# Patient Record
Sex: FEMALE | Race: BLACK OR AFRICAN AMERICAN | Employment: OTHER | URBAN - METROPOLITAN AREA
[De-identification: names, ages, dates, MRNs, and addresses within clinical notes are randomized per-mention and may not be internally consistent; named-entity substitution may affect disease eponyms.]

---

## 2017-03-15 NOTE — LETTER
[facility]  [address]  [facility] [address]  Phone: [phone]  Fax: [phone]                  Brittney Bolivar   3/15/2017 10:15 AM   Office Visit    Description:  Female : 2009   Provider:  Elise Galvez NP   Department:  [facility]           Reason for Visit     Rectal Bleeding           Diagnoses this Visit        Comments    Generalized abdominal pain    -  Primary     Constipation, unspecified constipation type                To Do List           Future Appointments        Provider Department Dept Phone    2017 9:15 AM Vinay Horan MD [facility] [phone]      Goals (5 Years of Data)     None      Follow-Up and Disposition     Return if symptoms worsen or fail to improve.      [facility]     [facility] On Call Nurse Care Line -  Assistance  Registered nurses in the [facility] provide clinical advisement, health education, appointment booking, and other advisory services.  Call for this free service at [phone].             Medications                Verify that the below list of medications is an accurate representation of the medications you are currently taking.  If none reported, the list may be blank. If incorrect, please contact your healthcare provider. Carry this list with you in case of emergency.           Current Medications     cetirizine (ZYRTEC) 5 MG chewable tablet Take 1 tablet (5 mg total) by mouth once daily.    cloNIDine (CATAPRES) 0.1 MG tablet Take 0.1 mg by mouth every evening.    diphenhydrAMINE (BENADRYL) 12.5 mg/5 mL elixir Take 2.5 mLs (6.25 mg total) by mouth 4 (four) times daily as needed for Itching or Allergies.    fluoxetine (PROZAC) 20 MG capsule Take 20 mg by mouth every morning.    fluticasone (FLONASE) 50 mcg/actuation nasal spray 1 spray by Each Nare route once daily.    ondansetron (ZOFRAN) 4 MG tablet Take 1 tablet (4 mg total) by mouth every 8 (eight) hours as needed  AUTHORIZATION FOR SURGICAL OPERATION OR OTHER PROCEDURE    1. I hereby authorize Dr. Marshall Bueno and the [facility] Office staff assigned to my case to perform the following operation and/or procedure at the [facility] Office:    _______________________________________________________________________________________________    NERVE BLOCK   _______________________________________________________________________________________________    2.  My physician has explained the nature and purpose of the operation or other procedure, possible alternative methods of treatment, the risks involved, and the possibility of complication to me.  I acknowledge that no guarantee has been made as to the result that may be obtained.  3.  I recognize that, during the course of this operation, or other procedure, unforseen conditions may necessitate additional or different procedure than those listed above.  I, therefore, further authorize and request that the above named physician, his/her physician assistants or designees perform such procedures as are, in his/her professional opinion, necessary and desirable.  4.  Any tissue or organs removed in the operation or other procedure may be disposed of by and at the discretion of the [facility] Office staff and [facility].  5.  I understand that in the event of a medical emergency, I will be transported by local paramedics to [facility] or other hospital emergency department.  6.  I certify that I have read and fully understand the above consent to operation and/or other procedure.    7.  I acknowledge that my physician has explained sedation/analgesia administration to me including the risks and benefits.  I consent to the administration of sedation/analgesia as may be necessary or desirable in the judgement of my physician.    Witness signature: ___________________________________________________ Date:  ______/______/_____                    Time:  ________ A.M.  P.M.        Patient Name:  ____Peg Garcia     [id number]    1/15/1955         Patient signature:  ___________________________________________________      Statement of Physician  My signature below affirms that prior to the time of the procedure, I have explained to the patient and/or his/her guardian, the risks and benefits involved in the proposed treatment and any reasonable alternative to the proposed treatment.  I have also explained the risks and benefits involved in the refusal of the proposed treatment and have answered the patient's questions.                        Date:  ______/______/_______  Provider                      Signature:  __________________________________________________________       Time:  ___________ A.M    P.M.        "for Nausea.           Clinical Reference Information           Your Vitals Were     BP Pulse Resp Height Weight BMI    118/82 (BP Location: Left arm, Patient Position: Sitting, BP Method: Manual) 88 16 4' 2" (1.27 m) 29.8 kg (65 lb 11.2 oz) 18.48 kg/m2      Blood Pressure          Most Recent Value    BP  (!)  118/82      Allergies as of 3/15/2017     Celery (Apium Graveolens) (Umbelliferae)      Immunizations Administered on Date of Encounter - 3/15/2017     None      Orders Placed During Today's Visit      Normal Orders This Visit    Ambulatory Referral to Pediatric Gastroenterology     C-reactive protein     CBC auto differential     Comprehensive metabolic panel     H.Pylori Antibody IgG     Sedimentation rate, manual     Future Labs/Procedures Expected by Expires    X-Ray Abdomen AP 1 View  3/15/2017 3/15/2018      [facility] Proxy Access     For Parents with an Active [facility] Account, Getting Proxy Access to Your Child's Record is Easy!     Ask your provider's office to jessica you access.    Or     1) Sign into your [facility] account.    2) Fill out the online form under My Account >Family Access.    Don't have a [facility] account? Go to [email or web address], and click New User.     Additional Information  If you have questions, please e-mail [email or web address] or call [phone] to talk to our [facility] staff. Remember, [facility] is NOT to be used for urgent needs. For medical emergencies, dial 911.         Instructions      Constipation (Child)    Bowel movement patterns vary in children. A child around age 2 will have about 2 bowel movements per day. After 4 years of age, a child may have 1 bowel movement per day.  A normal stool is soft and easy to pass. But sometimes stools become firm or hard. They are difficult to pass. They may pass less often. This is called constipation. It is common in children. Each child's bowel habits are a little different. What seems like constipation in one child may be normal " in another. Symptoms of constipation can include:  · Abdominal pain  · Refusal to eat  · Bloating  · Vomiting  · Streaks of blood in stools  · Problems holding in urine or stool  · Stool in your child's underwear  · Painful bowel movements  · Itching, swelling, bleeding, or pain around the anus  Constipation can have many causes, such as:  · Eating a diet low in fiber  · Eating too many dairy foods or processed foods  · Not drinking enough liquids  · Lack of exercise or physical activity  · Stress or changes in routine  · Frequent use or misuse of laxatives  · Ignoring the urge to have a bowel movement or delaying bowel movements  · Medicines such as prescription pain medicine, iron, antacids, certain antidepressants, and calcium supplements  · Less commonly, bowel blockage and bowel inflammation  Simple constipation is easy to stop once the cause is known. Healthcare providers may or may not do any tests to diagnose constipation.  Home care  Your childs healthcare provider may prescribe a bowel stimulant, lubricant, or suppository. Your child may also need an enema or a laxative. Follow all instructions on how and when to use these products.  Food, drink, and habit changes  You can help treat and prevent your childs constipation with some simple changes in diet and habits.  Make changes in your childs diet, such as:  · Replace cow's milk with a nondairy milk or formula made from soy or rice.  · Increase fiber in your childs diet. You can do this by adding fruits, vegetables, cereals, and grains.  · Make sure your child eats less meat and processed foods.  · Make sure your child drinks more water. Certain fruit juices such as pear, prune, and apple, can be helpful. However, fruit juices are full of sugar so limit fruit juice to 2 to 4 ounces a day in children 4 to 8 months old, and 6 ounces in children 8 to 12 months old.  · Be patient and make diet changes over time. Most children can be fussy about food.  Help  your child have good toilet habits. Make sure to:  · Teach your child not wait to have a bowel movement.  · Have your child sit on the toilet for 10 minutes at the same time each day. It is helpful to have your child sit after each meal. This helps to create a routine.  · Give your child a comfortable childs toilet seat and a footstool.  · You can read or keep your child company to make it a positive experience.  Follow-up care  Follow up with your childs healthcare provider.  Special note to parents  Learn to be familiar with your childs normal bowel pattern. Note the color, form, and frequency of stools.  Call 911  Call 911 if your child has any of these symptoms:  · Firm belly that is very painful to the touch  · Trouble breathing  · Confusion  · Loss of consciousness  · Rapid heart rate  When to seek medical advice  Call your childs healthcare provider right away if any of these occur:  · Abdominal pain that gets worse  · Fussiness or crying that cant be soothed  · Refusal to drink or eat  · Blood in stool  · Black, tarry stool  · Constipation that does not get better  · Weight loss  · Your child is younger than 12 weeks and has a fever of 100.4°F (38°C)  or higher because your baby may need to be seen by his or her healthcare provider  · Your child is younger than 2 years old and his or her fever continues for more than 24 hours or your child 2 years or older has a fever for more than 3 days.  · A child 2 years or older has a fever for more than 3 days  · A child of any age has repeated fevers above 104°F (40°C)   Date Last Reviewed: 12/12/2015  © 2481-8865 [employer]. [address] [place] [address]. All rights reserved. This information is not intended as a substitute for professional medical care. Always follow your healthcare professional's instructions.        When Your Child Has Constipation    Constipation is a common problem in children. Your child has constipation if he or  she has stools that are hard and dry, which often leads to straining or difficulty passing stool.  What causes constipation?  Constipation can be caused by:  · Too little fiber in the diet  · Too little liquid in the diet  · Not enough exercise  · Painful past bowel movements. This can lead to your child holding his or her stool.  · Stress and anxiety issues. These can include changes in routine or problems at home or school.  · Certain medicines  · Physical problems. These can include abnormalities of the colon or rectum.  · Recent illness or surgery. This could be from dehydration and medicines.  What are common symptoms of constipation?  · Feeling the urge to pass stool, but not being able to  · Cramping  · Bloating and gas  · Decreased appetite  · Stool leakage  · Nausea  How is constipation diagnosed?  The healthcare provider examines your child. Youll be asked about your childs symptoms, diet, health, and daily routine. The healthcare provider may also order some tests or X-rays to rule out other problems.  How is constipation treated?  The healthcare provider can talk to you about treatment options. Your child may need to:  · Eat more fiber and drink more liquids. Fiber is found in most whole grains, fruits, and vegetables. It adds bulk and absorbs water to soften stool. This helps stool pass through the colon more easily. Drinking water and moderate amounts of certain fruit juices, such as prune or apple juice, can also help soften stool.  · Get more exercise. Exercise can help the colon work better and ease constipation.  · Take stool softeners. The healthcare provider may suggest stool softeners for your child. Your child should take them until bowel movements become more regular and the diet is adjusted. Discuss with your child's healthcare provider exactly which medicines to give you child and for how long.  · Do bowel retraining. The healthcare provider may tell you to have your child sit on the  toilet for 5 to 10 minutes at a time, several times a day. The best time to do this is after a meal. This helps the child relearn the feeling of needing to have a bowel movement.  Call the healthcare provider if your child  · Is vomiting repeatedly or has green or bloody vomit  · Remains constipated for more than 2 weeks  · Has blood mixed in the stool or has very dark or tarry stools  · Repeatedly soils his or her underpants  · Cries or complains about belly pain not relieved with the passage of gas   Date Last Reviewed: 10/1/2016  © 0383-9058 [facility]. [address] [place] [address], [place] [address]. All rights reserved. This information is not intended as a substitute for professional medical care. Always follow your healthcare professional's instructions.             Language Assistance Services     ATTENTION: Language assistance services are available, free of charge. Please call [phone].      ATENCIÓN: Si habla winifred, tiene a rehman disposición servicios gratuitos de asistencia lingüística. Llame al [phone].     BERNICE Ý: N?u b?n nói Ti?ng Vi?t, có các d?ch v? h? tr? ngôn ng? mi?n phí dành cho b?n. G?i s? [phone].         [facility] complies with applicable Federal civil rights laws and does not discriminate on the basis of race, color, national origin, age, disability, or sex.
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
## 2017-09-20 NOTE — ED PROVIDER NOTES
Patient Seen in: [facility] AND [facility] Emergency Department    History   Patient presents with:  Chest Pain  Head Injury  Headache (neurologic)    Stated Complaint: chest pain     HPI    58-year-old female patient presents her complaining of persistent recur swelling or deformity. No bleeding noted. No skin break.   Heart: Regular rate and rhythm, no murmur  Lungs: Normal respiratory effort, clear lungs  Abdomen: Soft,  nondistended, non tender  : No CVA tenderness  Skin: No rash, no lesions  Musculoskeleta ============================================================  ED Course  ------------------------------------------------------------  MDM     Patient resting comfortably. Feeling somewhat better.       Disposition and Plan     Clinical Impression:  Po
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
## 2018-02-27 NOTE — ED PROVIDER NOTES
Patient Seen in: [facility] AND [facility] Emergency Department    History   Patient presents with:  Headache (neurologic)      HPI    Patient presents to the ED complaining of a left-sided headache that radiates into her left arm and left leg.   She states sympt are normal. Pupils are equal, round, and reactive to light. Right eye exhibits no discharge. Left eye exhibits no discharge. Neck: No tracheal deviation present. Cardiovascular: Normal rate and intact distal pulses. No murmur heard.   Pulmonary/Chest patient presents with left-sided headaches, left arm pain and left leg pain since September. Symptoms have been present ever since a head injury at that time. Patient in no distress in the ED, neurologically intact, and no distress.   Given pain meds and
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
## 2019-10-16 NOTE — TELEPHONE ENCOUNTER
Hi Dr. Hero Nelson received an order for physical therapy from Dr. Dali Amaya on 9/25/19. Edmond Alex went to the ED on 9/30/19 for the same pains. In the ED report it said to follow up with you 2 days later.  Pt states that she cannot get an appointme

## 2019-10-16 NOTE — PROGRESS NOTES
Pt presented to PT 15 min late for today's appt. Pt's order was written by Dr. Jemma Quintero on 9/25/19. Pt went to the ED on 9/30/19 for back/hip pain. Per the ED MD pt was to follow up with primary care physician 2 days later.  Pt states that she could not follo

## 2019-10-23 NOTE — PROGRESS NOTES
LUMBAR SPINE EVALUATION:   Referring Physician: Dr. Chance Fields  Diagnosis: Left hip pain (M25.552)  Acute left-sided low back pain without sciatica (M54.5)     Evaluation Date: 10/23/2019  Visit # 1  Scheduled Visits 8  Insurance Authorized visits 10 medica strength, poor posture, antalgic gait and poor stair negotiation. PT was unable to finish eval secondary to too much pain. PT will assess remaining tests/measures on next visit.   Functional deficits include but are not limited to walking, bending, lifting, Complexity decision making due to 3+ personal factors/comorbidities, 4+ body structures involved/activity limitations, and unstable symptoms including changing pain levels. PLAN OF CARE:    Goals: To be met in 4-6 weeks  1.  Pt to be independent i

## 2019-10-25 NOTE — PROGRESS NOTES
Dx: Left hip pain (M25.552)  Acute left-sided low back pain without sciatica (M54.5)             Insurance (Authorized # of Visits):  8           Authorizing Physician: Dr. Jessica Silver  Next MD visit: none scheduled  Fall Risk: standard         Precautions: n/

## 2019-10-30 NOTE — PROGRESS NOTES
Dx: Left hip pain (M25.552)  Acute left-sided low back pain without sciatica (M54.5)             Insurance (Authorized # of Visits):  8           Authorizing Physician: Dr. Lakia Pérez  Next MD visit: none scheduled  Fall Risk: standard         Precautions: n/

## 2019-10-30 NOTE — TELEPHONE ENCOUNTER
Dr. Hansel Bernal,    Pt has attended 3 physical therapy visits. All sessions have been limited by pain. Pt has 10/10 and is unable to perform exercises. Pt has no positions of comfort and pain is going down the left leg into the feet.  Pt reports that after the l

## 2019-10-31 NOTE — TELEPHONE ENCOUNTER
Left message to call back. Call center when the patient calls back please schedule the appointment.   Thanks

## 2019-11-01 NOTE — TELEPHONE ENCOUNTER
Called pt in regards to MD office trying to get a hold of her. Pt did not answer. PT left voicemail to call back department and MD office.

## 2019-11-01 NOTE — TELEPHONE ENCOUNTER
Please reply to pool: EM CALL CENTER--schedule with patient, see Dr Hansel Bernal message, Jennifer's message

## 2019-11-12 NOTE — PATIENT INSTRUCTIONS
Patient referred to physiatry for focal assessment and pain relief. Patient referred to GYN for postmenopausal bleeding work-up. Patient encouraged to get her pelvic ultrasound done.   Urinalysis, CBC and CMP ordered to assess the function of her kidneys
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
## 2019-11-14 PROBLEM — M79.10 MYALGIA: Status: ACTIVE | Noted: 2019-11-14

## 2019-11-14 PROBLEM — M25.552 PAIN OF BOTH HIP JOINTS: Status: ACTIVE | Noted: 2019-11-14

## 2019-11-14 PROBLEM — M54.41 ACUTE BILATERAL LOW BACK PAIN WITH BILATERAL SCIATICA: Status: ACTIVE | Noted: 2019-11-14

## 2019-11-14 PROBLEM — M54.42 ACUTE BILATERAL LOW BACK PAIN WITH BILATERAL SCIATICA: Status: ACTIVE | Noted: 2019-11-14

## 2019-11-14 PROBLEM — R29.3 POOR POSTURE: Status: ACTIVE | Noted: 2019-11-14

## 2019-11-14 PROBLEM — G47.9 SLEEP DISTURBANCE: Status: ACTIVE | Noted: 2019-11-14

## 2019-11-14 PROBLEM — M25.551 PAIN OF BOTH HIP JOINTS: Status: ACTIVE | Noted: 2019-11-14
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
## 2019-11-14 NOTE — PATIENT INSTRUCTIONS
-Start physical therapy and home exercises  -Muscle relaxer at night time, NSAID as needed  -Ice/Heat as tolerated  -Xray of the lumbar spine and hips  -MRI of the lumbar spine  -Follow up in 4 weeks

## 2019-11-14 NOTE — PROGRESS NOTES
[address] [facility]  NEW PATIENT EVALUATION    Consultation as a request of Dr. Meghan Mcclelland    Chief Complaint: bilateral back pain.     HISTORY OF PRESENT ILLNESS:   Patient presents with:  Low Back Pain: Patient presen taking initially. Pain is worse with standing and walking and she feels some instability as well.   Pain is better with rest.  She does feel pain at nighttime which is waking her up as well with some sharp shooting pain in the low back with radiation to th back and hip pain, negative for other joint pain/swelling   Neurological: positive for numbness/tingling, negative for weakness   Behavioral/Psych: negative for anxiety and depression  Endocrine: negative for diabetic symptoms including blurry vision, poly for: EAG, A1C  Lab Results   Component Value Date    WBC 4.7 09/19/2017    RBC 3.83 09/19/2017    HGB 12.4 09/19/2017    HCT 36.2 09/19/2017    MCV 94.5 09/19/2017    MCH 32.4 (H) 09/19/2017    MCHC 34.3 09/19/2017    RDW 13.4 09/19/2017     09/19/2 and low back for further evaluation. Additionally, given the severity of her symptoms and change in gait, I have ordered an MRI of the lumbar spine as well.   I have recommended the patient start with physical therapy again and she will need to work on Kyle Richard

## 2019-11-19 NOTE — TELEPHONE ENCOUNTER
Pt is needing forms to be completed asap, company is requesting by the 26th of Nov. Placed in forms bin at OP office

## 2019-11-25 NOTE — TELEPHONE ENCOUNTER
Patient called to follow up. I reached out to  [facility] who gave me [phone] number to give to patient. Relayed number to patient.

## 2019-11-25 NOTE — TELEPHONE ENCOUNTER
I am not able to approve that. This may be the patient projecting herself needing to be off that long. Until I hear a specialist or a physical therapist give the recommendation I will not be approving 6 months to be completely off.   She can go 1 more mon
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
## 2019-11-26 NOTE — TELEPHONE ENCOUNTER
Notified pt that non-FMLA is ready and she will  at [employer]. Please print for her.   The form has not been faxed to [address] b/c pt stated that she wanted to look at it first.

## 2019-12-03 NOTE — TELEPHONE ENCOUNTER
----- Message from Taurus Badillo DO sent at 11/17/2019  6:56 PM CST -----  Urine is normal with exception of few bacteria. In the chemistry panel it appears that the potassium is a little low.   I advised that the patient take potassium rich foods wh

## 2019-12-09 NOTE — PROGRESS NOTES
Patient Name: Magen Hurd, : 1/15/1955, MRN: [id number]   Date:  2019  Referring Physician:  Lázaro Zuniga    Diagnosis:     ICD-10-CM    1. Left hip pain M25.552    2.  Acute left-sided low back pain without sciatica M54.5        Discharg

## 2019-12-11 NOTE — TELEPHONE ENCOUNTER
NANETTEI - S/w Corby Hedrick (PT) who states patient walks in to PT moaning, pain 8/10. She cannot tolerate any exercises. Objectively, Corby Hedrick feels she is walking better than when she first started. Corby Hedrick has instructed the patient to call our office.      [place]

## 2019-12-12 NOTE — TELEPHONE ENCOUNTER
MRI L-spine was aoto approved on 11/14/19  Medicare Online for insurance coverage of MRI L-spine wo cpt code 44425. Insurance was verified and procedure is a covered benefit and does not require authorization. Will call Pt. To inform.  L/m advising no Naoma Torres

## 2019-12-17 NOTE — PATIENT INSTRUCTIONS
-MRI of the lumbar spine and follow up after  -Gabapentin 300mg TID to be started as tolerated  -Will discuss further treatment after MRI

## 2019-12-17 NOTE — PROGRESS NOTES
[address] [facility]  NEW PATIENT EVALUATION    Consultation as a request of Dr. Vishal Storey    Chief Complaint: bilateral back pain.     HISTORY OF PRESENT ILLNESS:   Patient presents with:  Low Back Pain: LOV 11/14/19 l buttock. She has difficulty ambulating as a result of this pain. She is ambulating with a cane in the right hand due to left-sided discomfort.   She denies any radiation of the pain past the knee, left side is worse compared to the left right but the righ by mouth daily. ALLERGIES:   No Known Allergies      FAMILY HISTORY:   History reviewed. No pertinent family history.        SOCIAL HISTORY:   Social History    Tobacco Use      Smoking status: Never Smoker      Smokeless tobacco: Never Used    Al gluteus medius and greater trochanters and IT bands. Strength:  At least 3 out of 5 in bilateral lower extremities, examination is somewhat limited secondary to pain  Sensation: Intact to light touch in all dermatomes of the lower extremities  Reflexes: 2/ completed for further evaluation and management. She may potentially benefit from a caudal epidural injection however we will decide further treatment after MRI imaging is completed.   In the meantime, I have started gabapentin medication for the patient t

## 2019-12-17 NOTE — TELEPHONE ENCOUNTER
Health Care Provider Certification Form started and placed in Dr. Iona Perez office mailbox for review and completion.

## 2019-12-26 NOTE — TELEPHONE ENCOUNTER
----- Message from Nesha Patel DO sent at 12/26/2019 12:33 PM CST -----  MRI shows severe spinal canal stenosis at L4-5. Please have her follow up for further treatment recs.  Thanks

## 2019-12-26 NOTE — TELEPHONE ENCOUNTER
Left detailed message informing patient of imaging results and recommendation to f/u in office. Instructed patient to call our office to make f/u appt.

## 2020-01-04 PROBLEM — M51.36 DEGENERATIVE DISC DISEASE, LUMBAR: Status: ACTIVE | Noted: 2020-01-04

## 2020-01-04 PROBLEM — N83.201 RIGHT OVARIAN CYST: Status: ACTIVE | Noted: 2020-01-04

## 2020-01-04 PROBLEM — M51.26 HNP (HERNIATED NUCLEUS PULPOSUS), LUMBAR: Status: ACTIVE | Noted: 2020-01-04

## 2020-01-04 PROBLEM — M48.061 SPINAL STENOSIS AT L4-L5 LEVEL: Status: ACTIVE | Noted: 2020-01-04

## 2020-01-04 PROBLEM — M51.369 DEGENERATIVE DISC DISEASE, LUMBAR: Status: ACTIVE | Noted: 2020-01-04

## 2020-01-04 NOTE — PROGRESS NOTES
[address]  NEW PATIENT EVALUATION    Consultation as a request of Dr. Duane Mackintosh    Chief Complaint: bilateral back pain.     HISTORY OF PRESENT ILLNESS:   Patient presents with:  Low Back Pain: Patient presen of this pain. This is affecting her ability to function and stay active. Patient continues to ambulate with the help of a cane. She has been unable to work since the onset of her injury as a result of this pain.     H&P:  The patient is a 59year old fem Diagnosis Date   • Essential hypertension          PAST SURGICAL HISTORY:   History reviewed. No pertinent surgical history.       CURRENT MEDICATIONS:     Current Outpatient Medications   Medication Sig Dispense Refill   • lidocaine 5 % External Ointment Atraumatic  Eyes: Extra-occular movements intact. Ears: No auricular hematoma or deformities  Mouth: No lesions or ulcerations  Heart: peripheral pulses intact. Normal capillary refill.    Lungs: Non-labored respirations  Abdomen: No abdominal guarding  E BILT 0.3 11/14/2019    TP 7.7 11/14/2019    ALB 3.6 11/14/2019    GLOBULIN 4.1 11/14/2019     11/14/2019    K 3.1 (L) 11/14/2019     11/14/2019    CO2 30.0 11/14/2019     No results found for: PTP, PT, INR  No results found for: VITD, QVITD, VI is worried about the complications from the injection and does not want to proceed with the injection at this time. Advised that she should call the office if she would like to have the procedure.   Additionally, I recommend that she can increase her gabap
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
## 2020-02-03 NOTE — PROGRESS NOTES
HPI:    Nilda Stroud is a 72year old female presents to clinic for follow-up regarding spinal stenosis and severe acute lower back pain. Recently tried 3 sessions of acupuncture, with no improvement. Would like to discuss next steps.       HI rales. Musculoskeletal:      Comments: Ambulating with a cane      Vitals reviewed.       ASSESSMENT/PLAN:   (M48.00) Spinal stenosis, unspecified spinal region  (primary encounter diagnosis)  (M54.5) Acute bilateral low back pain, unspecified whether sciat
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
## 2020-02-18 NOTE — PROGRESS NOTES
[facility] paperwork filled out until 03/17/2020 for injection f/u. Faxed to [phone]. Copy placed in scan bin.

## 2020-02-24 NOTE — TELEPHONE ENCOUNTER
Medicare Online for insurance coverage of Lumbar Interlaminar Epidural Steroid Injection L5-S1 under fluoroscopy guidance cpt code 12435. Insurance was verified and procedure is a covered benefit and does not require authorization. Will inform Nursing.
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
## 2020-02-25 NOTE — TELEPHONE ENCOUNTER
Patient has been scheduled for a Lumbar Interlaminar Epidural Steroid Injection L5-S1 under fluoroscopy guidance under MAC on 03/13/20 at the [facility]. Medications and allergies reviewed.  Patient informed to hold aspirins, nsaids, blood thinners, multivitamins

## 2020-02-26 NOTE — ED PROVIDER NOTES
Patient Seen in: [facility] AND [facility] Emergency Department      History   Patient presents with:  Pain    Stated Complaint: L hip pain, radiates to the buttocks     HPI    72year old female with htn and severe spinal stenosis L3-L5 who presents with back p supple. Cardiovascular:      Rate and Rhythm: Normal rate and regular rhythm. Pulses:           Dorsalis pedis pulses are 2+ on the right side and 2+ on the left side. Heart sounds: Normal heart sounds. No murmur.    Pulmonary:      Effort: Pulm Shanta Santizo DO  [address] [phone]      Call to schedule follow-up    We recommend that you schedule follow up care with a primary care provider within the next three months to obtain basic health screening including eliceo

## 2020-03-09 NOTE — PATIENT INSTRUCTIONS
The patient has been advised that she can take 2 gabapentin 300 mg capsules at one time making it the equivalent of 600 mg to be taken up to 3 times a day and attempts to reduce her lumbar radicular symptoms.   We are considering a Medrol steroid Dosepak, h

## 2020-03-13 NOTE — PROCEDURES
[address]    LUMBAR INTERLAMINAR  NAME:  Chel Morrison    MR #:    [id number] :  1/15/1955     PHYSICIAN:  Cem Shahid        Operative Report    DATE OF PROCEDURE: 3/13/2020   PREOPERATIVE DIAGNOSES: 1.  Spinal sten medication, aspiration was performed. No blood, fluid, or air was aspirated at anytime.     Sebas Hyde DO, FAAPMR & CAQSM  Physical Medicine and Rehabilitation/Sports Medicine  [facility]

## 2020-03-18 NOTE — PROGRESS NOTES
HPI:    Patient ID: Opal Vazquez is a 72year old female.     This is a 57-year-old -American female with confirmed herniated disc in the lumbar region at the level of L4-L5 with symptomatic radiculopathy and is currently taking gabapentin 1    2. Spinal stenosis at L4-L5 level  See #1.  - gabapentin 600 MG Oral Tab; Take 1 tablet (600 mg total) by mouth 3 (three) times daily. Dispense: 90 tablet; Refill: 1    No orders of the defined types were placed in this encounter.       Meds This Visi

## 2020-03-20 NOTE — TELEPHONE ENCOUNTER
I recommend immediate care. She needs to rule out fracture and perhaps have her digits taped together. Additionally she needs pain management.

## 2020-03-20 NOTE — TELEPHONE ENCOUNTER
Pt has been notified of message below, pt states she plans to go to IC will go to [facility].

## 2020-03-20 NOTE — ED PROVIDER NOTES
Patient Seen in: [address] [facility] [address]      History   Patient presents with:  Fall    Stated Complaint: FALL    HPI    28-year-old female patient with chronic low back pain with sciatica presents after slipping and falling backward lower back most prominent over the left buttock. Symmetric in strength. Neuro: Normal speech, nonfocal examination. Symmetric strength. Sensation is symmetric.   Psych: Appropriate, no agitation    ED Course   Labs Reviewed - No data to display       X

## 2020-03-20 NOTE — TELEPHONE ENCOUNTER
Please reply to pool: EM RN TRIAGE    Action Requested: Summary for Provider     []  Critical Lab, Recommendations Needed  [x] Need Additional Advice  []   FYI    [x]   Need Orders  [] Need Medications Sent to Pharmacy  []  Other     SUMMARY: Reported that

## 2020-04-08 NOTE — TELEPHONE ENCOUNTER
Verified pt name and . Informed pt Valtrex prescription sent to pharmacy as requested. Pt had no further questions at this time.

## 2020-04-08 NOTE — TELEPHONE ENCOUNTER
Action Requested: Summary for Provider     []  Critical Lab, Recommendations Needed  [x] Need Additional Advice  []   FYI    []   Need Orders  [] Need Medications Sent to Pharmacy  []  Other     SUMMARY: pt c/o genital herpes outbreak for 2 weeks now.  Re

## 2020-04-27 NOTE — PROGRESS NOTES
Virtual Telephone Check-In    Ashly Giron verbally consents to a Virtual/Telephone Check-In visit on 04/27/20.     Patient understands and accepts financial responsibility for any deductible, co-insurance and/or co-pays associated with this se

## 2020-06-22 PROBLEM — M47.816 FACET ARTHROPATHY, LUMBAR: Status: ACTIVE | Noted: 2020-06-22

## 2020-06-22 NOTE — TELEPHONE ENCOUNTER
Patient was scheduled for Left L3-4, L4-5 and L5-S1 Facet joint injection under fluoroscopy guidance under local anesthesia on Monday, June 29, 2020. Medications and allergies reviewed. Patient informed he will need a .  Patient informed not to eat
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
## 2020-06-22 NOTE — TELEPHONE ENCOUNTER
[employer] for authorization of approval for Left L3-4, L4-5 and L5-S1 Facet joint injection under fluoroscopy guidance cpt codes [id number], [id number], [id number].  Approval was given with Authorization # [id number] for one unit/DOS effective 06/24/20 to 09/2

## 2020-06-29 NOTE — PROCEDURES
[facility] Z-JOINT/FACET INJECTIONS  NAME:  Jaswinder Mcneil    MR #:    [id number] :  1/15/1955     PHYSICIAN:  Phoebe Shahid        Operative Report    DATE OF PROCEDURE: 2020   PREOPERATIVE DIAGNOSES: 1. procedure, the patient's pulse oximetry and vital signs were monitored and they remained completely stable. Also, throughout the whole procedure, prior to injection of any medication, aspiration was performed.   No blood, fluid, or air was aspirated at any

## 2020-07-14 NOTE — PATIENT INSTRUCTIONS
Pain management including the switch to Lyrica. Lidocaine ointment has been refilled. Patient encouraged to keep physiatry us updated regarding her progress or lack thereof. Medication compliance is paramount.
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
## 2020-07-14 NOTE — TELEPHONE ENCOUNTER
Patient had an appointment with Dr. Kyle Maldonado today, left an Attending Physician's Statement to be completed. Form, completed and signed HIPAA form and FCR emailed to [email or web address], original form left in the [facility] office, [place] fee paid.  Patient is re

## 2020-07-20 NOTE — TELEPHONE ENCOUNTER
This is going to need the support of the specialist.  They have a better position in determining her return to work.

## 2020-07-20 NOTE — TELEPHONE ENCOUNTER
Dr. Jacqueline Cuello,    Disab     Please sign off on form:  -Highlight the patient and hit \"Chart\" button.   -In Chart Review, w/in the Encounter tab - click 1 time on the Telephone call encounter for 7/14/20 Scroll down the telephone encounter.  -Click \"scan o

## 2020-07-20 NOTE — TELEPHONE ENCOUNTER
Dr. Bach Knife form pending. Pt has been off work since the [address] on 9/17/2019. Is the patient permanently disabled or is the rtw date determined by the release of the Physiatrist? Please advise.     Thank you,  [facility]

## 2020-07-21 NOTE — TELEPHONE ENCOUNTER
Spoke to pt and she will  the disab forms at [employer]. Please print out disab form for pt. Pt already paid.

## 2020-07-30 NOTE — PROGRESS NOTES
[address]  FOLLOW UP EVALUATION      Chief Complaint: left low back pain.     HISTORY OF PRESENT ILLNESS:   Patient presents with:  Low Back Pain: Patient present for a f/u on Left L3-4, L4-5 and L5-S1 Facet back pain in the lower lumbar axial spine. She denies any radiation specifically but occasionally does have radiating symptoms in the bilateral lower extremities. She denies any changes in sensation, strength or bowel bladder habits.   She continues to us past the knees. She denies any changes in sensation or strength otherwise. Denies any changes in bowel bladder habits. Pain is tolerable when seated, but persistent, worse when standing and walking.     12/17/19  Patient is here for follow-up of aniyah the right but the right side is also starting to hurt now. She denies any loss of bowel bladder control, any fevers chills or weight loss. She tried doing physical therapy for 2 sessions however was having too much pain at that time.   She also was taken mg total) by mouth 3 (three) times daily. 90 tablet 1   • gabapentin 300 MG Oral Cap Start with night time dose only. If well tolerated, increase to two times daily. If well tolerated, increase to three times daily.  (Patient not taking: Reported on 3/20/20 erythema, swelling, or obvious deformity.   Their iliac crest and shoulder heights are symmetrical.     Palpation: Tenderness with palpation of the left lower lumbar facet joints and lumbar paraspinals as well as right lower lumbar facet joints and right jacques neural foraminal stenosis. 3. Incidentally noted 2 cm right ovarian/adnexal cyst.  Pelvic ultrasound follow-up of this finding is recommended in this presumed postmenopausal patient.      4. Probable right renal cyst, which is incompletely characterized

## 2020-08-18 NOTE — TELEPHONE ENCOUNTER
Received incoming fax from [address] [place] [address] Ray, [address]. Will leave forms/fax with front office staff.    Will inform LUIS Front office

## 2020-09-15 NOTE — PATIENT INSTRUCTIONS
We have entertained possibly returning to the chiropractor for more of a traction type treatment as opposed to acupuncture. Lyrica will be increased to 100 mg twice daily. Pain management via prescription medications as needed. Comply with medications.

## 2020-09-21 NOTE — TELEPHONE ENCOUNTER
Received call from Dr Pietro Siegel, he is recommending patient follow up with Dr Consuelo Branch so she can have epidural/injection scheduled. Reports patient is struggling to walk and having a lot of pain.  Please advise on referral or if patient should schedule a

## 2020-09-21 NOTE — PROGRESS NOTES
HPI:    Patient ID: Bernard Bolden is a 72year old female.     This is a 27-year-old -American female who was established at this clinic who unfortunately continues to have chronic intermittently incapacitating low back pain and radiculopat PHYSICAL EXAM:   Physical Exam    Constitutional: She is oriented to person, place, and time. She appears well-developed and well-nourished. She appears distressed. Cardiovascular: Normal rate and regular rhythm. Exam reveals no gallop.     [place] home. Limit salt intake. Encouraged physical fitness and daily physical activity if at all possible. Return in about 3 months (around 12/15/2020), or if symptoms worsen or fail to improve.          JULIETA#[id number]

## 2020-09-22 NOTE — TELEPHONE ENCOUNTER
Can you please clarify? Is the chiropractor asking for authorization for epidural injection or see suggesting that this patient needs that from a different specialty area?   If the suggestion is that the patient see a physiatry us, then the patient does no

## 2020-09-22 NOTE — TELEPHONE ENCOUNTER
Spoke with Dr. Max Montes, chiropractor. He is seeing patient three times a week and just does not feel he can give patient all the help she needs.  She does benefit from the treatments but he is thinking a steroid injection may benefit patient additionally at

## 2020-09-23 NOTE — TELEPHONE ENCOUNTER
I do not have any thing else that I can add. I agree with the advice given to follow-up with physiatry.

## 2020-09-24 PROBLEM — M70.62 GREATER TROCHANTERIC BURSITIS OF LEFT HIP: Status: ACTIVE | Noted: 2020-09-24

## 2020-09-24 NOTE — TELEPHONE ENCOUNTER
Called [facility] Clinical Review  for authorization of approval of Ultrasound guided left greater trochanteric bursa aspiration and injection with corticosteroid  cpt codes 98756, 36341, .  Talked to Renetta Nunez. who states authorization is not required for

## 2020-09-24 NOTE — PROGRESS NOTES
[address] [facility]  FOLLOW UP EVALUATION      Chief Complaint: left low back pain. HISTORY OF PRESENT ILLNESS:   Patient presents with:  Low Back Pain: LOV: 7/30/20. F/U on lower back pain.  Patient states that sh time ambulating any distance because of this pain. She walks with a cane and has a difficult time going up and down the stairs and is in severe pain today on the way into the office. She denies any changes in strength, sensation, bowel bladder habits.   Mariana Reyes the low back with radiation to bilateral buttock and posterior thighs. She has had MRI imaging of the lumbar spine as noted below. Her pain is worse with standing and ambulating, better with nothing.   She denies any changes in bowel bladder habits, sensa ability to function and stay active. Patient continues to ambulate with the help of a cane. She has been unable to work since the onset of her injury as a result of this pain.     H&P:  The patient is a 72year old female with significant past medical his PAST SURGICAL HISTORY:   History reviewed. No pertinent surgical history.       CURRENT MEDICATIONS:     Current Outpatient Medications   Medication Sig Dispense Refill   • pregabalin (LYRICA) 100 MG Oral Cap Take 1 capsule (100 mg total) by mouth 2 abdominal guarding  Extremities: No lower extremity edema bilaterally   Skin: No lesions noted   Cognition: alert & oriented x 3, attentive, able to follow 2 step commands, comprehention intact, spontaneous speech intact  Psychiatric: Mood and affect appro 11/14/2019     No results found for: PTP, PT, INR  No results found for: VITD, QVITD, VITD25, NLEW04FP    IMAGING:   MRI Lumbar spine 12/24/19  CONCLUSION:      1.  L4-L5:  Severe multifactorial spinal canal with mild bilateral lateral recess and minimal ri fernando.    Vero MENDIOLA [address]  Physical Medicine and Rehabilitation/Sports Medicine
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
## 2020-11-17 NOTE — TELEPHONE ENCOUNTER
Patient called and advised she was giving a courtesy call, per her insurance. She spoke to her insurance about getting a [facility] with helping her with some things during the day.     [facility] Karthikeyan Anthony Patient likes:  [facility]

## 2020-11-18 NOTE — TELEPHONE ENCOUNTER
I typically will not support or advise any home health or home help unless it is initiated by the patient/family because the need has been expressed. I would pass on this.

## 2021-01-29 NOTE — TELEPHONE ENCOUNTER
Please reply to pool: EM RN TRIAGE  Action Requested: Summary for Provider     []  Critical Lab, Recommendations Needed  [] Need Additional Advice  [x]   FYI    []   Need Orders  [] Need Medications Sent to Pharmacy  []  Other     SUMMARY: Patient contacts

## 2021-01-30 NOTE — ED PROVIDER NOTES
Patient Seen in: [facility]    History   CC: finger laceration  HPI: Valorie Siemens Lutcher 77year old female  who presents c/o left index finger laceration s/p injury this am aprox 0300 in which she cut it on a piece of her walker.  Rakanies c Physical Exam     ED Triage Vitals [01/29/21 0589]   BP 90/76   Pulse 71   Resp 18   Temp 97.2 °F (36.2 °C)   Temp src    SpO2 100 %   O2 Device None (Room air)       Current:BP 90/76   Pulse 71   Temp 97.2 °F (36.2 °C)   Resp 18   SpO2 100% Nuno Mendoza DO  [address] [address] [address] [address]  [phone]    Schedule an appointment as soon as possible for a visit in 2 days        Medications Prescribed:  Current Discharge Medication List

## 2021-01-30 NOTE — ED INITIAL ASSESSMENT (HPI)
Pt here with a laceration to her left 2nd finger, pt staets she cut it on her walker at 3 am this morning, pt states finger is tender

## 2021-02-22 PROBLEM — E87.6 HYPOPOTASSEMIA: Status: ACTIVE | Noted: 2021-02-22

## 2021-02-22 PROBLEM — R07.9 CHEST PAIN: Status: ACTIVE | Noted: 2021-02-22

## 2021-02-22 NOTE — PATIENT INSTRUCTIONS
Pain management via prescription medications as needed. Comply with medications. Monitor blood pressures and record at home. Limit salt intake. Recommend weight loss via daily exercising and consistent healthy dietary changes.   Encouraged physical fitne

## 2021-02-28 NOTE — PROGRESS NOTES
HPI:    Patient ID: Jaswinder Mcneil is a 77year old female.     This patient is a 80-year-old hypertensive -American female established clinically presents for follow-up regarding her chronic left hip pain and lumbar region pain with weaknes BP: 130/82   Pulse:    Resp:        PHYSICAL EXAM:   Physical Exam    Constitutional: She is oriented to person, place, and time. She appears distressed. Neck: No thyromegaly present. Cardiovascular: Normal rate and regular rhythm.   Exam reveals no g 12.5 MG Oral Tab 90 tablet 1     Sig: Take 1 tablet (12.5 mg total) by mouth daily. • pregabalin (LYRICA) 150 MG Oral Cap 60 capsule 0     Sig: Take 1 capsule (150 mg total) by mouth 2 (two) times daily.        Imaging & Referrals:  None  Patient Instruct

## 2021-03-25 NOTE — TELEPHONE ENCOUNTER
Patient is eligible for a 2021 Medicare Annual Wellness visit. Discussed in detail w/patient. Appt scheduled for 5/10/21.

## 2021-03-30 NOTE — TELEPHONE ENCOUNTER
Patient dropped off her form to be completed for her no-fee disability vehicle sticker, would like to be contacted after completion so she can .

## 2021-05-10 NOTE — PROGRESS NOTES
HPI/Subjective:   Patient ID: Jaswinder Mcneil is a 77year old female.     The following is a previous intake from the last visit:    This patient is a 26-year-old hypertensive -American female established clinically presents for follow-up re daily. 90 tablet 1   • pregabalin (LYRICA) 150 MG Oral Cap Take 1 capsule (150 mg total) by mouth 2 (two) times daily. 60 capsule 0   • DULoxetine HCl (CYMBALTA) 30 MG Oral Cap DR Particles Take 1 capsule (30 mg total) by mouth daily.  (Patient not taking: Take 1 capsule (150 mg total) by mouth 2 (two) times daily. Dispense: 60 capsule; Refill: 0    2. Spinal stenosis at L4-L5 level  Referred and medication refilled. - NEUROSURGERY - INTERNAL  - pregabalin (LYRICA) 150 MG Oral Cap;  Take 1 capsule (150 mg t

## 2021-05-10 NOTE — PATIENT INSTRUCTIONS
Patient being referred to Dr. Bernabe Ocampo, neurosurgery for second opinion regarding the possibility of surgical intervention versus continuation of conservative treatment. Pain management. Comply with medications. Monitor blood pressures and record at home.  L

## 2021-05-11 NOTE — TELEPHONE ENCOUNTER
Orlando traylor she spoke to [facility] office and would like 's office to order an MRI of lumbar spine without contrast. Please call when ready. Please advise.

## 2021-05-12 NOTE — TELEPHONE ENCOUNTER
Order for the MRI of the lumbar spine has been placed on the chart. Please call the patient and let her know.

## 2021-06-23 NOTE — TELEPHONE ENCOUNTER
Patient is eligible for a 2021 Medicare Annual Wellness visit. Left message to call back [phone].

## 2021-07-15 NOTE — TELEPHONE ENCOUNTER
Dr. Thomas Dave, patient is requesting a mammogram order. Last mammogram was completed 2019. Please advise on order. FINDINGS:    The breast tissue is composed  of scattered fibroglandular tissue. Density B.  No   dominant masses or malignant calcificati

## 2021-07-26 NOTE — TELEPHONE ENCOUNTER
Current Outpatient Medications:   •  HYDROCHLOROTHIAZIDE 12.5 MG Oral Tab, TAKE 1 TABLET(12.5 MG) BY MOUTH DAILY, Disp: 90 tablet, Rfl: 1

## 2021-07-27 NOTE — TELEPHONE ENCOUNTER
Mail in pharm updated. Requested Prescriptions     Pending Prescriptions Disp Refills   • hydrochlorothiazide 12.5 MG Oral Tab 90 tablet 1     Sig: Take 1 tablet (12.5 mg total) by mouth daily.        Last Office Visit with PCP: 5/10/2021  Please advise

## 2021-08-02 PROBLEM — M54.16 CHRONIC LEFT-SIDED LUMBAR RADICULOPATHY: Status: ACTIVE | Noted: 2021-08-02

## 2021-08-02 NOTE — TELEPHONE ENCOUNTER
[facility] for authorization of approval for MRI L-spine wo cpt code 62509. Authorization Number [id number] effective 08/02/21 to 09/01/21. L/m advising of approval. Can proceed with scheduling appt.
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
## 2021-08-03 NOTE — TELEPHONE ENCOUNTER
Patient has been scheduled for a Left L4 and L5 Transforaminal Epidural Steroid Injection on 8/9/21 at the [facility]. Medications and allergies reviewed.  Patient informed we will need verbal or written authorization from patients Primary Care Physician/Cardiolo

## 2021-08-03 NOTE — TELEPHONE ENCOUNTER
[facility] for authorization of approval for Left L4 and L5 Transforaminal Epidural Steroid Injection under fluoroscopy cpt codes 27039-ON, 41706-UF, 74174. Authorization #[id number] for one unit/DOS effective 08/04/21 to 09/18/21.  Will inform

## 2021-08-03 NOTE — PROGRESS NOTES
[address] [facility]  FOLLOW UP EVALUATION      Chief Complaint: left low back pain. HISTORY OF PRESENT ILLNESS:   Patient presents with:  Low Back Pain: LOV: 8/2/21 Patient f/u on MRI Lumbar spine 8/2/21.  C/o L lo been getting chiropractic care with Dr. Magy Salinas and has started experiencing lateral hip pain. She notices pain is 9 out of 10, worse with increased activity and ambulation.   Pain is also in her lower back but she denies any radiating symptoms in the left evaluation. 6/22/20  Patient is here for follow-up of low back pain. She had a L5-S1 interlaminar epidural steroid injection with me in March which did not provide any significant improvement.   Today, she is complaining of primarily left-sided low back not noted any significant improvement. She continues to take gabapentin and Tylenol as needed for the pain. Today her pain is moderate to severe, localized in the low back with radiation to the buttock and posterior thighs.   She denies any radiation past along the lateral hips and buttock. She has difficulty ambulating as a result of this pain. She is ambulating with a cane in the right hand due to left-sided discomfort.   She denies any radiation of the pain past the knee, left side is worse compared to mg by mouth every 6 (six) hours as needed for Pain. • pregabalin (LYRICA) 100 MG Oral Cap Take 1 capsule (100 mg total) by mouth 2 (two) times daily.  (Patient not taking: Reported on 8/3/2021 ) 60 capsule 1   • pregabalin (LYRICA) 50 MG Oral Cap Take 1 to pain. She walks with a cane in the right hand  Posture: Increased thoracic kyphosis and decreased lumbar lordosis    Musculoskeletal/Neurological Exam:    LUMBAR SPINE/HIP:  Inspection: no erythema, swelling, or obvious deformity.   Their iliac crest an particularly at L4-5 where there is severe spinal canal stenosis with bilateral subarticular zone impingement of the descending L5 nerve roots as well as moderate bilateral foraminal stenosis at this level. MRI Lumbar spine 12/24/19  CONCLUSION:      1. Rehabilitation/Sports Medicine

## 2021-08-09 NOTE — PROGRESS NOTES
[facility] [facility]    2-LEVEL LUMBAR TRANSFORAMINAL   NAME:  Griselda Ferrara    MR #:    [id number] :  1/15/1955     PHYSICIAN:  Milagros Middleton DO        Operative Report    DATE OF PROCEDURE: 2021   PREOPERATIVE DIAGNOSES: given discharge instructions and will follow up in the clinic as scheduled. Throughout the whole procedure, the patient's pulse oximetry and vital signs were monitored and they remained completely stable.   Also, throughout the whole procedure, prior to in

## 2021-08-24 NOTE — PROGRESS NOTES
[address]  FOLLOW UP EVALUATION      Chief Complaint: left low back pain.     HISTORY OF PRESENT ILLNESS:   Patient presents with:  Low Back Pain: Patient f/u on L TFESI(8/9/21) with 30% improvement presentl left-sided with radiation to the buttock and down the leg along the shin with pain radiating to the foot. She has numbness tingling worsening over the last 3 to 4 months. Patient was scheduled to follow-up with me but never did in December.   She was sche pain.  She walks with a cane and has a difficult time going up and down the stairs and is in severe pain today on the way into the office. She denies any changes in strength, sensation, bowel bladder habits.   She has a hard time doing activities of daily buttock and posterior thighs. She has had MRI imaging of the lumbar spine as noted below. Her pain is worse with standing and ambulating, better with nothing. She denies any changes in bowel bladder habits, sensation or strength otherwise.   She continue Patient continues to ambulate with the help of a cane. She has been unable to work since the onset of her injury as a result of this pain.     H&P:  The patient is a 77year old female with significant past medical history of HTN who presents with bilatera HISTORY:   History reviewed. No pertinent surgical history. CURRENT MEDICATIONS:     Current Outpatient Medications   Medication Sig Dispense Refill   • hydrochlorothiazide 12.5 MG Oral Tab Take 1 tablet (12.5 mg total) by mouth daily.  90 tablet 1   • movements intact. Ears: No auricular hematoma or deformities  Mouth: No lesions or ulcerations  Heart: peripheral pulses intact. Normal capillary refill.    Lungs: Non-labored respirations  Abdomen: No abdominal guarding  Extremities: No lower extremity e 37 11/14/2019    BILT 0.3 11/14/2019    TP 7.7 11/14/2019    ALB 3.6 11/14/2019    GLOBULIN 4.1 11/14/2019     11/14/2019    K 3.1 (L) 11/14/2019     11/14/2019    CO2 30.0 11/14/2019     No results found for: PTP, PT, INR  No results found for significant improvement with the lumbar epidural steroid injection.  She has had multiple treatments including greater troches bursa injection, facet joint injections, interlaminar epidural steroid injection but has not noted any improvement with any of the

## 2021-08-24 NOTE — PATIENT INSTRUCTIONS
-See neurosurgery for consultation  -Continue Lyrica twice daily, lidcocaine cream  -Follow up as needed

## 2021-09-16 NOTE — TELEPHONE ENCOUNTER
Pt would like referral for a phycologist. I did give the number to behavioral health as well.  Please advise

## 2021-09-18 NOTE — TELEPHONE ENCOUNTER
Telephone number that she gave to the patient for behavioral health is a great start to try to initiate psychological referral.  She can let us know whether she is able to make contact and/or an appointment.

## 2021-10-05 NOTE — TELEPHONE ENCOUNTER
Patient called stating that she is sending her  up to grab completed forms. Writer relayed that should be fine.

## 2021-10-06 NOTE — TELEPHONE ENCOUNTER
S/w with patient's , extremely frustrated that he cannot get billing for this patient. Patient has requested these records many times as well as patient.  I've given information for billing [phone] and she stated she will not be able to give r

## 2021-12-27 NOTE — PROGRESS NOTES
Subjective:   Patient ID: Omi Boston is a 77year old female.     44-year-old -American female here for Medicare annual visit for and for status update on any confirmed chronic medical illnesses and follow up on any previous labs or proc External Lab or Procedure   Diabetes Screening      HbgA1C   Annually No results found for: A1C No flowsheet data found. Fasting Blood Sugar (FSB)Annually Glucose (mg/dL)   Date Value   11/14/2019 127 (H)    No flowsheet data found.    Cardiovascular Dis Persistent     Medications (ACE/ARB, digoxin, diuretics)    Potassium  Annually Potassium (mmol/L)   Date Value   11/14/2019 3.1 (L)    No flowsheet data found.     Creatinine  Annually Creatinine (mg/dL)   Date Value   11/14/2019 1.00    No flowsheet data conversations in a noisy background such as a crowded room or restaurant: No   I get confused about where sounds come from: No I misunderstand some words in a sentence and need to ask people to repeat themselves: No   I especially have trouble understandin COMP METABOLIC PANEL (14); Future  - LIPID PANEL; Future  - URINALYSIS, ROUTINE; Future  - TSH W REFLEX TO FREE T4; Future    3. Essential hypertension  Blood pressure measures to goal when measured manually by physician.     4. Herniation of lumbar interve

## 2021-12-29 PROBLEM — M25.561 ACUTE PAIN OF RIGHT KNEE: Status: ACTIVE | Noted: 2021-12-29

## 2021-12-30 PROBLEM — R26.2 INABILITY TO AMBULATE DUE TO KNEE: Status: ACTIVE | Noted: 2021-12-30

## 2021-12-30 PROBLEM — S16.1XXA STRAIN OF NECK MUSCLE, INITIAL ENCOUNTER: Status: ACTIVE | Noted: 2021-12-30
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
## 2021-12-30 NOTE — ED QUICK NOTES
Orders for admission, patient is aware of plan and ready to go upstairs. Any questions, please call ED RN at [phone]    Vaccinated?  Yes  Type of COVID test sent: Rapid  COVID Suspicion level: Low      Titratable drug(s) infusing: NA  Rate:    LOC at time of

## 2021-12-30 NOTE — ED QUICK NOTES
Pt states \"pain is mild if I'm not moving, goes up when you touch it or with moving. \" Pt in no acute distress.  SPO2 drops to 87% RA at rest. Pt placed on O2 at 2L/min via NC, SPO2 up to 99%
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
## 2021-12-30 NOTE — H&P
LANE HOSPITALIST  History and Physical     Miles Ellison Patient Status:  Emergency    1/15/1955 MRN [id number]   Location [facility] Attending Bradford Graf MD   Hosp Day # 0 PCP Gideon Vela DO Chi times daily. , Disp: 90 capsule, Rfl: 0        Review of Systems:   A comprehensive 14 point review of systems was completed. Pertinent positives and negatives noted in the HPI.     Physical Exam:    /73 (BP Location: Left arm)   Pulse 67   Temp 98 in the last 168 hours. Creatinine Kinase  Recent Labs   Lab 12/30/21  0117          Inflammatory Markers  No results for input(s): CRP, KIM, LDH, DDIMER in the last 168 hours. Imaging: Imaging data reviewed in Epic.       ASSESSMENT / PLAN:

## 2021-12-30 NOTE — PROGRESS NOTES
Assumed care of pt at this time from [facility], [facility].

## 2021-12-30 NOTE — PROGRESS NOTES
Spoke with MRI, they are sending for pt. Removed pt's jewlery, removed immobilizer to RLE due to metal in it. Pt voided. Dilaudid given. Pt to XR, then to MRI.

## 2021-12-30 NOTE — PROGRESS NOTES
NURSING ADMISSION NOTE    Patient admitted via Cart from ER at around 3078 Dipika Rodrigo to room. Safety precautions initiated. Bed in low position. Call light in reach.

## 2021-12-30 NOTE — CM/SW NOTE
12/30/21 1200   CM/SW Referral Data   Referral Source Social Work (self-referral)   Reason for Referral Discharge planning   Informant Patient   Patient Info   Patient's Current Mental Status at Time of Assessment Alert;Oriented   Patient's Home Environ

## 2021-12-30 NOTE — PROGRESS NOTES
Assumed care from [place] at 4717. Pt c/o headache, asking RN to give her BP medication. BP taken 139/62 . Paged Dr. Gilda Lombardo to inform.
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
## 2021-12-31 NOTE — CM/SW NOTE
PT informed MSW that they are recommending Acute rehab. PMR consult placed and referrals sent via Aidin. SW will discuss with the patient.     Annika Bolaños LCSW

## 2021-12-31 NOTE — PLAN OF CARE
Pt a/oX4 on room air. . Left SCD. Tolerating regular diet. Last bowel movement on 12/28. Voiding in bedpan. Right knee immobilizer. Ice applied. +2 right pulse. Severe pain to right knee when moving. IV dilaudid given for pain.  Safety precautions in cristine

## 2021-12-31 NOTE — PROGRESS NOTES
[facility]     Hospitalist Progress Note     Renetta Ellison Patient Status:  Observation    1/15/1955 MRN [id number]   [facility] 3SW-A Attending Albania Thakur MD   Hosp Day # 0 PCP Emeka Culver DO     Chief Complaint:        Inflammatory Markers  No results for input(s): CRP, KIM, LDH, DDIMER in the last 168 hours. Imaging: Imaging data reviewed in Epic.     Medications:   • enoxaparin  40 mg Subcutaneous Daily   • hydrochlorothiazide  12.5 mg Oral Daily   • p
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
## 2021-12-31 NOTE — PLAN OF CARE
Patient A&Ox4, VSS on room air. Reporting moderate-severe pain to right knee. PO pain medication given. Immobilizer in place on right leg, ice applied, elevated. SCD to Left leg. Up out of bed with physical therapy.    Plan to continue to ice leg and contro

## 2021-12-31 NOTE — PROGRESS NOTES
Patient doing okay. Exam shows persistent hypersensitivity. Minimal swelling. All muscle compartments are soft. Impression is that of muscle contusion and lateral ligament strain. Second impression is that of peroneal nerve neuritis.     Patient may

## 2021-12-31 NOTE — PROGRESS NOTES
[facility]     Hospitalist Progress Note     Judith Ellison Patient Status:  Observation    1/15/1955 MRN [id number]   [facility] 3SW-A Attending Gordon Gilford, MD   Hosp Day # 0 PCP Lio Santana DO     Chief Complaint: 12/30/21  0117          Inflammatory Markers  No results for input(s): CRP, KIM, LDH, DDIMER in the last 168 hours. Imaging: Imaging data reviewed in Epic.     Medications:   • enoxaparin  40 mg Subcutaneous Daily   • hydrochlorothiazide  12.5 mg O
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
## 2021-12-31 NOTE — PLAN OF CARE
Assume care of patient at 3:15 pm, patient was having MRI. Dr. Ashish Ruth (on call for Dr. Aisha Trejo) sent message that MRI report was back. Right leg with mild pitting edema. She rates pain as 9, iv dilaudid given with good relief, pedal pulse strong.  Tyler Nazario

## 2022-01-01 NOTE — PROGRESS NOTES
[facility]     Hospitalist Progress Note     Cynthia Ellison Patient Status:  Observation    1/15/1955 MRN [id number]   [facility] 3SW-A Attending Tacho Todd MD   Hosp Day # 0 PCP Domo Modi DO     Chief Complaint: Inflammatory Markers  No results for input(s): CRP, KIM, LDH, DDIMER in the last 168 hours. Imaging: Imaging data reviewed in Epic.     Medications:   • docusate sodium  100 mg Oral BID   • cefTRIAXone  1 g Intravenous Q24H   • hydrochlorothiazide
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
## 2022-01-01 NOTE — CONSULTS
Physical Medicine and Rehabilitation Remote Consult Note    Janis Nelson  female  [id number]  359/359-A  12/29/2021  Nithya Kamara MD  1/15/1955  77year old      Please note that this is a remote consultation and patient was not seen in person.  C Current:  PT documentation on 01/01/2021  Gait Assessment   Functional Mobility/Gait Assessment  Gait Assistance: Contact guard assist  Distance (ft): 8 x 2  Assistive Device: Rolling walker  Pattern: R Decreased stance time    Bed Mobility:  Rolling r 30.0 28.0     Imaging:  XR FEMUR MIN 2 VIEWS RIGHT (UJZ=11701)    Result Date: 12/29/2021  CONCLUSION:  No acute fracture or dislocation.     Dictated by (CST): Hildred Soulier, MD on 12/29/2021 at 9:48 PM     Finalized by (CST): Hildred Soulier, MD on 12/29/20 10:43 PM     Finalized by (CST): Fredi Rizvi MD on 12/29/2021 at 10:55 PM       CT SPINE CERVICAL (CPT=72125)    Result Date: 12/29/2021  CONCLUSION:  No acute cervical spine fracture or subluxation.   Straightening of the normal cervical lordosis likely CHEST AP PORTABLE  (CPT=71045)    Result Date: 12/29/2021  CONCLUSION:  Normal heart size and pulmonary vascularity. Scattered increased interstitial markings throughout both lungs. No focal consolidation, effusion or pneumothorax.     Dictated by (CST):

## 2022-01-01 NOTE — DISCHARGE SUMMARY
[facility]    Discharge Summary    Gerhardt Bad Lutcher Patient Status:  Observation    1/15/1955 MRN [id number]   [facility] 3SW-A Attending Rebecca Mcghee MD   Hosp Day # 0 PCP Fatuma Lagos DO     Date of Admission:  each  Refills: 0        ASK your doctor about these medications      Instructions Prescription details   acetaminophen 500 MG Tabs  Commonly known as: TYLENOL EXTRA STRENGTH      Take 500 mg by mouth every 6 (six) hours as needed for Pain.    Refills: 0

## 2022-01-01 NOTE — PROGRESS NOTES
Decreased pain right knee. Right knee with moderate tenderness over lateral knee. Neuro intact RLE. Impression is that of healing sprain lateral right knee and soleus muscle strain. Plan is for acute rehab when bed available.  If weather or bed daryn
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
## 2022-01-01 NOTE — PLAN OF CARE
Pt A&O. On room air. Tele and  monitoring maintained. Scds to LLE. Tolerating reg diet with good appetite. Last BM 12/28/21. Colace and miralax given this AM. Voiding w/o difficulty. Pain managed with oral medications.  Participating in therapy as ordere

## 2022-01-01 NOTE — PLAN OF CARE
Pt a/oX4 on room air. . Left SCD. Tolerating regular diet. Last bowel movement on 12/28. Tele- NSR. Voiding in bedpan. Right knee immobilizer. Ice applied. +2 right and left pulse. PO PRN medication available for pain. Safety precautions in place.  Call

## 2022-01-02 NOTE — CM/SW NOTE
MSW reviewed chart, DC Order noted. MSW spoke to RN that states dc is not official, PT to re-see pt. MSW asked for dc order to be canceled if pt is actually not to dc today    Praneeth Westbrook is still pending, no agency found yet.  Maybe hard to get provider in place o
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
## 2022-01-02 NOTE — PLAN OF CARE
Alert and oriented,V/S stable,,room air. TELE,rhythm stable. complained earlier of rt.breast pain,after trying so hard to have a bowel movement,fleet enema given but only passed gas. Instructed patient that she should wait a little bit. RLE on knee knee imm
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
## 2022-01-03 NOTE — PROGRESS NOTES
[facility]     Hospitalist Progress Note     Gerhardt Bad Scot Patient Status:  Observation    1/15/1955 MRN [id number]   [facility] 3SW-A Attending Rebecca Mcghee MD   Hosp Day # 0 PCP Fatuma Lagos DO     Chief Complaint: Inflammatory Markers  No results for input(s): CRP, KIM, LDH, DDIMER in the last 168 hours. Recent Labs   Lab 12/30/21  0117          Imaging: Imaging data reviewed in Epic.     Medications:   • docusate sodium  100 mg Oral BID   • cefTRIAXon
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
## 2022-01-03 NOTE — PROGRESS NOTES
Patient continues to have pain of the right leg however she is improved according to her history. Patient notes however that she still has pain with weightbearing. Patient did have a Doppler study performed this morning which was negative for any DVTs.

## 2022-01-03 NOTE — CM/SW NOTE
Patient was denied to go to Acute rehab and needs more assistance at home than family can provide currently. No accepting [address]. SARBJIT referrals made. DON screen called. Hopeful dc today.     Baljinder Cruz, ANGELIQUEW    ADDENDUM:  Facilities are asking
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
## 2022-01-03 NOTE — PROGRESS NOTES
[facility]     Hospitalist Progress Note     Cortney Ellison Patient Status:  Observation    1/15/1955 MRN [id number]   [facility] 3SW-A Attending Nitesh Givens MD   Hosp Day # 0 PCP Zeferino Patel DO     Chief Complaint: Inflammatory Markers  No results for input(s): CRP, KIM, LDH, DDIMER in the last 168 hours. Imaging: Imaging data reviewed in Epic.     Medications:   • magnesium citrate  296 mL Oral Once   • docusate sodium  100 mg Oral BID   • cefTRIAXone  1 g I
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
## 2022-01-04 NOTE — PLAN OF CARE
Aox4, reporting mild pain when bending knee, reinforced usage of knee immobilizer when OOB, no surgical intervention to fibula fx, negative for DVT in RLE, RLE swollen, on Lovenox, on tele nsr, on room air , voiding freely, Sennakot offered and administ

## 2022-01-04 NOTE — CM/SW NOTE
Spoke with Shan Miller from PT who stated pt has improved with therapy today. She is recommending outpatient therapy and 24 hour supervision at home. Met with pt and spoke with pt's son, Liliana Hemphill ([phone]) via speaker phone.   Pt/son agreeable wit

## 2022-01-05 NOTE — PROGRESS NOTES
LM for pt to call [facility] for TCM since discharge. [facility] phone number was provided for pt to call back.

## 2022-01-05 NOTE — PLAN OF CARE
Cleared for discharge home today. Written and verbal discharge education provided for pt, all questions answered. Norco and cipro prescriptions provided. Knee immobilizer in place with ambulation. Discharged home with outpatient physical therapy.  Son will
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
## 2022-01-06 NOTE — PROGRESS NOTES
Attempted to reach the patient to complete a [facility] FU call. Left a message for the pt to call the [facility] back at, [phone].
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
## 2022-01-07 NOTE — DISCHARGE SUMMARY
[facility]    Discharge Summary    Cortney Ellison Patient Status:  Observation    1/15/1955 MRN [id number]   [facility] 3SW-A Attending No att. providers found   Hosp Day # 0 PCP Zeferino Patel DO     Date of Admission on: January 9, 2022  Quantity: 10 tablet  Refills: 0     HYDROcodone-acetaminophen 5-325 MG Tabs  Commonly known as: Ana Maynard  Notes to patient: This is your narcotic pain medication as needed for severe pain.  Do not exceed 4,000 mg of acetaminophen in 24 lauren doctor or nurse    Bring a paper prescription for each of these medications  · ciprofloxacin 500 MG Tabs  · HYDROcodone-acetaminophen 5-325 MG Tabs         Follow up Visits:  Follow-up with Dr. Nesbitt Back  in 2 weeks      Other Discharge Instructions: DEBORA PALACIOS

## 2022-01-12 NOTE — TELEPHONE ENCOUNTER
Pt was called and appt was given.      Future Appointments   Date Time Provider Elizabeth Reyes   1/18/2022 10:45 AM France Ferguson CNM [facility]   1/21/2022  1:20 PM DO MARIA G Sutherland   5/12/2022  1:20 PM Isi Campos

## 2022-01-12 NOTE — TELEPHONE ENCOUNTER
Please refer to my message sent regarding her  Elia Carballo.   They can be seen on the same day if it is made possible, however if they have specialty type injuries such as broken bones, etc. they would do well to follow-up with specialist for these
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
## 2022-01-21 ENCOUNTER — TELEPHONE (OUTPATIENT)
Dept: INTERNAL MEDICINE CLINIC | Facility: CLINIC | Age: 67
End: 2022-01-21

## 2022-01-21 NOTE — TELEPHONE ENCOUNTER
Patient dropped off form for a handicap placard renewal,for completion, would like to be called when ready

## 2022-01-21 NOTE — PATIENT INSTRUCTIONS
Pain management via prescription medications as needed. Medication reviewed and renewed where needed and appropriate. Comply with medications. Monitor blood pressures and record at home. Limit salt intake.   I do advise that the patient keep all recommen
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
## 2022-03-30 NOTE — PATIENT INSTRUCTIONS
-Schedule EMG test with me for both legs  -Start knee therapy  -Start Amitryptyline at nighttime  -Continue Lyrica twice daily

## 2022-04-14 NOTE — TELEPHONE ENCOUNTER
dropped off form for his wife from [address] [facility]// attending physician statement, after completed would also like to

## 2022-04-15 NOTE — TELEPHONE ENCOUNTER
I reviewed the chart looking for a diagnosis (which should have been in the message), I see notes from Dr. Adri Gardner recommending an EMG. As the patient going for an EMG procedure done by neurology which is already ordered and does not need a referral from me, or if the patient going to see neurologist for office visit?

## 2022-04-18 NOTE — TELEPHONE ENCOUNTER
Under upcoming appointments patient is scheduled for paresthesias     Referral has been pended please approve if appropriate

## 2022-04-20 NOTE — TELEPHONE ENCOUNTER
Form has been reviewed and signed by [facility]. Informed patient of form ready to be picked up at the .

## 2022-05-18 NOTE — PROGRESS NOTES
Patient was seen in the ED on 03/08/2022 for LUE. Patient states numbness started in her forearm into her hand. Patient states numbness and tingling has decreased. Patient states a crawling sensation and cramping in the fingers. Patient states increase in weakness in the LUE.

## 2022-05-26 NOTE — TELEPHONE ENCOUNTER
Refill passed per [employer], [facility] protocol. Requested Prescriptions   Pending Prescriptions Disp Refills    HYDROCHLOROTHIAZIDE 12.5 MG Oral Tab [Pharmacy Med Name: HYDROCHLOROTHIAZIDE 12.5 MG Tablet] 90 tablet 1     Sig: TAKE 1 TABLET EVERY DAY        Hypertensive Medications Protocol Passed - 5/26/2022  3:04 AM        Passed - CMP or BMP in past 12 months        Passed - Appointment in past 6 or next 3 months        Passed - GFR  > 50     Lab Results   Component Value Date    GFRAA 72 12/30/2021                     Recent Outpatient Visits              2 days ago     [address] [place] [address],     Office Visit    1 week ago     [address] [place] [address]    Office Visit    1 week ago Numbness and tingling in left hand    Risa Osgood, Cecille Davis, MD    Office Visit    1 week ago     [address] [place] [address]    Office Visit    2 weeks ago     [address] [place] [address]    Office Visit          Future Appointments         Provider Department Appt Notes    In 5 days Caprice Montalvo, [address]? ??  TPL  send to ins verifier   [employer]/  $40 c/p    In 1 week Desmond Allen,  [facility]-Physiatry EMG - BLE    In 1 week Licoa Montalvo, [address] TPL  send to ins verifier   [employer]/  $40 c/p    In 1 week Caprice Montalvo, [address] TPL  send to ins verifier   [employer]/  $40 c/p    In 2 weeks Romilda Montalvo, [address] TPL  send to ins verifier   [employer]/  $40 c/p    In 2 weeks Romilda Montalvo, [address] TPL  send to ins verifier   [employer]/  $40 c/p    In 3 weeks Romilda Montalvo, [address] TPL  send to ins verifier   [employer]/  $40 c/p    In 4 weeks Freddie Pena  [facility] EMG- 1 Extremity

## 2022-07-08 NOTE — PROGRESS NOTES
Patient left before being seen.     Nadeem Lovelace DO, FAAPMR & CAQSM  Physical Medicine and Rehabilitation/Sports Medicine  MEDICAL CENTER [facility]

## 2022-08-03 NOTE — PROCEDURES
Date of service: 8/3/2022    Procedure: Nerve Conduction Study and Electromyography - complete EMG study in left upper extremity. History: Electrodiagnostic testing on this 79year old female was performed in left upper extremity. The patient is complaining of left hand numbness. Electrodiagnostic testing is as follows. RN was present throughout today's office visit. Findings:  Sensory Nerve Conduction Study (NCS) of left median nerve demonstrated response amplitude of 23.5uV, distal latency 3.18ms and conduction velocity of 56m/s. Sensory NCS of left ulnar nerve demonstrated response amplitude of 14.5uV, distal latency of 2.81ms and conduction velocity of 64m/s. Motor NCS of left median nerve recording Abductor Pollicis Brevis (APB) demonstrated response amplitude of 10.9uV, distal latency of 3.29ms and conduction velocity of 64.2m/s. Motor NCS of left ulnar nerve recording abductor digiti minimi (ADM) demonstrated  response amplitude of 9.1uV, distal latency of 2.79ms and conduction velocity of  35.6m/s below elbow, 107.2m/s above elbow. Needle electromyography (EMG) of selected muscles representing left C5-T1 myotomes including FIRST D. INTEROSS (FDI), FLEX. CARPI. RAD (FCR), BICEPS, TRICEPS, DELTOID, were tested as the following. Left FIRST D. INTEROSS, FLEX. CARPI. RAD, BICEPS, TRICEPS, DELTOID reveals normal insertional activity, normal spontaneous activity. Motor unit potentials (MUPs) are of normal amplitude and duration with a full recruitment pattern. No fibrillations or positive sharp waves (PSWs) or fasciculations were recorded. Impression:  1. Normal nerve conduction study in left upper extremity. 2. There is no electrophysiologic evidence of ongoing denervation in left C5-T1 myotomes. Clinical correlation is recommended.      Ze Trejo MD  Neurology  [facility]  8/3/2022, 2:46 PM  CC: Marty Treviño DO

## 2022-08-16 NOTE — PATIENT INSTRUCTIONS
-My office will call once injection is approved  -Continue Amitryptyline 25mg at nighttime  -Continue PT and home exercises  -Follow up 2 weeks after injection

## 2022-08-16 NOTE — TELEPHONE ENCOUNTER
LMTCB 1st attempt    Procedure: Right L4 + L5 transforaminal epidural steroid injection  Anesthesia: Local  Dx: M54.16  Location: [facility] (verified on referral)  CPT Codes: Violetta Henry 10, [id number], [id number]

## 2022-08-17 PROBLEM — G57.31 PERONEAL NEURITIS, RIGHT: Status: ACTIVE | Noted: 2022-08-17

## 2022-08-17 PROBLEM — M54.16 RIGHT LUMBAR RADICULOPATHY: Status: ACTIVE | Noted: 2022-08-17

## 2022-08-17 NOTE — TELEPHONE ENCOUNTER
Patient has been scheduled for Right L4 + L5 transforaminal epidural steroid injection on 9/12/22 at the [facility]  with .   -Anesthesia type: Local.  -If scheduling [facility] covid testing required for all procedures whether patient is vaccinated or not. -Patient informed not to eat or drink anything after midnight the night prior to the procedure, if being sedated. -Patient was advised that if he/she does receive the covid vaccine it needs to be at least 2 weeks before or after the injection. -Medications and allergies reviewed. -Patient reminded to hold NSAIDs (Ibuprofen, ASA 81, Aleve, Naproxen, Mobic, Diclofenac, Etodolac, Celebrex etc.) for 3 days prior to [facility]  if BMI is greater than 35. For Cervical injections only hold multivitamins, Vitamin E, Fish Oil, Phentermine (Lomaira) for 7 days prior to injection and NSAIDS.  mg to be held for 7 days prior to injections.  -If patient is receiving MAC/IVCS Phentermine Dallie Jeronimo) will need to be held for 7 days prior to injection.  -If on blood thinner clearance has been received to hold this medication by provider.   -Patient informed he/she will need a  to and from procedure. -[facility] is located in the [facility] 1st floor. Patient may park in the yellow parking. Patient verbalized understanding and agrees with plan.  -----> Scheduled in Epic: Yes  -----> Scheduled in Casetabs:  Yes

## 2022-08-31 ENCOUNTER — TELEPHONE (OUTPATIENT)
Dept: PHYSICAL MEDICINE AND REHAB | Facility: CLINIC | Age: 67
End: 2022-08-31

## 2022-08-31 NOTE — TELEPHONE ENCOUNTER
pt is calling to CANCEL her [facility] injection for Sept 12th  - Right L4 + L5 transforaminal epidural steroid injection.  Pls call to discuss, she does NOT want to reschedule

## 2022-09-08 NOTE — PATIENT INSTRUCTIONS
All adult screening ordered and done appropriate for patient's age and gender and risk factors and complaints. Recommend weight loss via daily exercising and consistent healthy dietary changes. Encouraged physical fitness and daily physical activity daily. Medication reviewed and renewed where needed and appropriate. Comply with medications.

## 2022-09-15 ENCOUNTER — APPOINTMENT (OUTPATIENT)
Dept: PHYSICAL THERAPY | Facility: HOSPITAL | Age: 67
End: 2022-09-15
Attending: HOSPITALIST
Payer: MEDICARE
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
## 2022-09-22 ENCOUNTER — APPOINTMENT (OUTPATIENT)
Dept: PHYSICAL THERAPY | Facility: HOSPITAL | Age: 67
End: 2022-09-22
Attending: HOSPITALIST
Payer: MEDICARE

## 2022-09-23 ENCOUNTER — LAB ENCOUNTER (OUTPATIENT)
Dept: LAB | Facility: HOSPITAL | Age: 67
End: 2022-09-23
Attending: INTERNAL MEDICINE

## 2022-09-23 DIAGNOSIS — N18.32 STAGE 3B CHRONIC KIDNEY DISEASE (HCC): ICD-10-CM

## 2022-09-23 LAB
ALBUMIN SERPL-MCNC: 3.6 G/DL (ref 3.4–5)
ANION GAP SERPL CALC-SCNC: 7 MMOL/L (ref 0–18)
BILIRUB UR QL: NEGATIVE
BUN BLD-MCNC: 10 MG/DL (ref 7–18)
BUN/CREAT SERPL: 8.7 (ref 10–20)
C3 SERPL-MCNC: 138 MG/DL (ref 90–180)
C4 SERPL-MCNC: 31.2 MG/DL (ref 10–40)
CALCIUM BLD-MCNC: 9.1 MG/DL (ref 8.5–10.1)
CHLORIDE SERPL-SCNC: 100 MMOL/L (ref 98–112)
CLARITY UR: CLEAR
CO2 SERPL-SCNC: 31 MMOL/L (ref 21–32)
COLOR UR: YELLOW
CREAT BLD-MCNC: 1.15 MG/DL
CREAT UR-SCNC: 97.6 MG/DL
CRP SERPL-MCNC: 0.47 MG/DL (ref ?–0.3)
ERYTHROCYTE [SEDIMENTATION RATE] IN BLOOD: 38 MM/HR
GFR SERPLBLD BASED ON 1.73 SQ M-ARVRAT: 52 ML/MIN/1.73M2 (ref 60–?)
GLUCOSE BLD-MCNC: 89 MG/DL (ref 70–99)
GLUCOSE UR-MCNC: NEGATIVE MG/DL
HGB UR QL STRIP.AUTO: NEGATIVE
KETONES UR-MCNC: NEGATIVE MG/DL
MICROALBUMIN UR-MCNC: <0.5 MG/DL
NITRITE UR QL STRIP.AUTO: NEGATIVE
OSMOLALITY SERPL CALC.SUM OF ELEC: 285 MOSM/KG (ref 275–295)
PH UR: 7 [PH] (ref 5–8)
PHOSPHATE SERPL-MCNC: 2.8 MG/DL (ref 2.5–4.9)
POTASSIUM SERPL-SCNC: 3.4 MMOL/L (ref 3.5–5.1)
PROT UR-MCNC: 6.3 MG/DL
PROT UR-MCNC: NEGATIVE MG/DL
RHEUMATOID FACT SERPL-ACNC: <10 IU/ML (ref ?–15)
SODIUM SERPL-SCNC: 138 MMOL/L (ref 136–145)
SP GR UR STRIP: 1.01 (ref 1–1.03)
UROBILINOGEN UR STRIP-ACNC: 0.2

## 2022-09-23 PROCEDURE — 36415 COLL VENOUS BLD VENIPUNCTURE: CPT

## 2022-09-23 PROCEDURE — 86431 RHEUMATOID FACTOR QUANT: CPT

## 2022-09-23 PROCEDURE — 81015 MICROSCOPIC EXAM OF URINE: CPT

## 2022-09-23 PROCEDURE — 84165 PROTEIN E-PHORESIS SERUM: CPT

## 2022-09-23 PROCEDURE — 85652 RBC SED RATE AUTOMATED: CPT

## 2022-09-23 PROCEDURE — 86235 NUCLEAR ANTIGEN ANTIBODY: CPT

## 2022-09-23 PROCEDURE — 80069 RENAL FUNCTION PANEL: CPT

## 2022-09-23 PROCEDURE — 86160 COMPLEMENT ANTIGEN: CPT

## 2022-09-23 PROCEDURE — 86335 IMMUNFIX E-PHORSIS/URINE/CSF: CPT

## 2022-09-23 PROCEDURE — 84166 PROTEIN E-PHORESIS/URINE/CSF: CPT

## 2022-09-23 PROCEDURE — 81001 URINALYSIS AUTO W/SCOPE: CPT

## 2022-09-23 PROCEDURE — 86039 ANTINUCLEAR ANTIBODIES (ANA): CPT

## 2022-09-23 PROCEDURE — 86140 C-REACTIVE PROTEIN: CPT

## 2022-09-23 PROCEDURE — 86225 DNA ANTIBODY NATIVE: CPT

## 2022-09-23 PROCEDURE — 82570 ASSAY OF URINE CREATININE: CPT

## 2022-09-23 PROCEDURE — 86038 ANTINUCLEAR ANTIBODIES: CPT

## 2022-09-23 PROCEDURE — 82043 UR ALBUMIN QUANTITATIVE: CPT

## 2022-09-26 LAB — NUCLEAR IGG TITR SER IF: POSITIVE {TITER}

## 2022-09-26 NOTE — TELEPHONE ENCOUNTER
Refill Request    Medication request: amitriptyline 25 MG Oral Tab    Thermon Deeds Y, DO   Due back to clinic per last office note:  Post injection  NOV: Visit date not found - cxld injection & did not want to reschedule      [place] 8/16/22 #30    Urine drug screen (if applicable): None  Pain contract: None    LOV plan (if weaning or changing medications): -Continue Amitryptyline 25mg at nighttime

## 2022-09-27 LAB
ALBUMIN SERPL ELPH-MCNC: 4 G/DL (ref 3.75–5.21)
ALBUMIN/GLOB SERPL: 1.29 {RATIO} (ref 1–2)
ALPHA1 GLOB SERPL ELPH-MCNC: 0.3 G/DL (ref 0.19–0.46)
ALPHA2 GLOB SERPL ELPH-MCNC: 0.8 G/DL (ref 0.48–1.05)
ANA NUCLEOLAR TITR SER IF: 80 {TITER}
B-GLOBULIN SERPL ELPH-MCNC: 0.95 G/DL (ref 0.68–1.23)
DSDNA AB TITR SER: <10 {TITER}
GAMMA GLOB SERPL ELPH-MCNC: 1.04 G/DL (ref 0.62–1.7)
PROT SERPL-MCNC: 7.1 G/DL (ref 6.4–8.2)

## 2022-09-28 LAB
ENA SM IGG SER QL: NEGATIVE
ENA SM+RNP AB SER QL: NEGATIVE
ENA SS-A AB SER QL IA: NEGATIVE
ENA SS-B AB SER QL IA: NEGATIVE

## 2022-09-29 ENCOUNTER — TELEPHONE (OUTPATIENT)
Dept: NEPHROLOGY | Facility: CLINIC | Age: 67
End: 2022-09-29

## 2022-09-29 ENCOUNTER — APPOINTMENT (OUTPATIENT)
Dept: PHYSICAL THERAPY | Facility: HOSPITAL | Age: 67
End: 2022-09-29
Attending: HOSPITALIST
Payer: MEDICARE

## 2022-09-30 ENCOUNTER — HOSPITAL ENCOUNTER (OUTPATIENT)
Dept: ULTRASOUND IMAGING | Facility: HOSPITAL | Age: 67
Discharge: HOME | End: 2022-09-30
Attending: INTERNAL MEDICINE
Payer: MEDICARE

## 2022-09-30 DIAGNOSIS — N18.32 STAGE 3B CHRONIC KIDNEY DISEASE (HCC): ICD-10-CM

## 2022-09-30 PROCEDURE — 76770 US EXAM ABDO BACK WALL COMP: CPT | Performed by: INTERNAL MEDICINE

## 2022-10-01 ENCOUNTER — TELEPHONE (OUTPATIENT)
Dept: NEPHROLOGY | Facility: CLINIC | Age: 67
End: 2022-10-01

## 2022-10-05 ENCOUNTER — TELEPHONE (OUTPATIENT)
Dept: FAMILY MEDICINE CLINIC | Facility: CLINIC | Age: 67
End: 2022-10-05

## 2022-10-10 ENCOUNTER — OFFICE VISIT (OUTPATIENT)
Dept: NEPHROLOGY | Facility: CLINIC | Age: 67
End: 2022-10-10
Payer: MEDICARE

## 2022-10-10 VITALS
HEIGHT: 65 IN | SYSTOLIC BLOOD PRESSURE: 120 MMHG | HEART RATE: 78 BPM | DIASTOLIC BLOOD PRESSURE: 72 MMHG | WEIGHT: 206 LBS | BODY MASS INDEX: 34.32 KG/M2

## 2022-10-10 DIAGNOSIS — N18.31 STAGE 3A CHRONIC KIDNEY DISEASE (HCC): Primary | ICD-10-CM

## 2022-10-10 PROCEDURE — 99213 OFFICE O/P EST LOW 20 MIN: CPT | Performed by: INTERNAL MEDICINE

## 2022-10-10 PROCEDURE — 3078F DIAST BP <80 MM HG: CPT | Performed by: INTERNAL MEDICINE

## 2022-10-10 PROCEDURE — 3074F SYST BP LT 130 MM HG: CPT | Performed by: INTERNAL MEDICINE

## 2022-10-10 PROCEDURE — 3008F BODY MASS INDEX DOCD: CPT | Performed by: INTERNAL MEDICINE

## 2022-10-10 NOTE — PROGRESS NOTES
10/10/22        Patient: Martita Patel   YOB: 1955   Date of Visit: 10/10/2022       Dear  Dr. Junior Barnes,      Thank you for referring Anders Lake to my practice. Please find my assessment and plan below. As you know she is a 26-year-old -American female with a history of hypertension, chronic low back pain and chronic right knee pain who I now had the pleasure of seeing for follow-up of chronic kidney disease stage III. The patient just underwent a recent renal evaluation. Of note is that a urinalysis, sed rate, connective tissue profile and random urine for Bence-Anderson protein was nonrevealing. Urine microalbumin to creatinine ratio was normal.  She also had a renal ultrasound that showed a 1.6 cm right renal cyst but otherwise was unremarkable. Finally a repeat creatinine done in September 23, 2022 showed improvement down to 1.15 now with an estimated GFR of 52 cc/min. Potassium better but borderline low at 3.4. I therefore informed the patient that she does have chronic kidney disease stage III secondary to hypertension and nephrosclerosis. Reinforced importance of maintaining adequate hydration and avoiding nonsteroidals. Blood pressure was good today 120/72. She is still borderline hypokalemic. She states she is taking an over-the-counter potassium supplement. She will call me with the name of this but may be prudent to just add a potassium chloride supplement. High potassium foods were also reviewed with her. Recommended repeating a CBC and renal panel in 3 months. I will see her again in 6 months for follow-up or sooner if clinically indicated. Thank you again for allowing me to participate in the care of your patient. If you have any questions please feel free to call.            Sincerely,   Rosy Lozada MD   [facility] , [address] [place] [address]  [address] [facility] 310  [address] [facility] [address] [address]    Document electronically generated by:  Lima Hess MD

## 2022-10-10 NOTE — PATIENT INSTRUCTIONS
Let me know the name and the dose of your potassium supplement. Eat foods that are high in potassium. Repeat your kidney blood test in 3 months. Orders are in the computer.

## 2022-12-07 ENCOUNTER — OFFICE VISIT (OUTPATIENT)
Dept: FAMILY MEDICINE CLINIC | Facility: CLINIC | Age: 67
End: 2022-12-07
Payer: MEDICARE

## 2022-12-07 VITALS
OXYGEN SATURATION: 98 % | BODY MASS INDEX: 35 KG/M2 | DIASTOLIC BLOOD PRESSURE: 82 MMHG | WEIGHT: 209 LBS | SYSTOLIC BLOOD PRESSURE: 128 MMHG | RESPIRATION RATE: 19 BRPM | HEART RATE: 70 BPM

## 2022-12-07 DIAGNOSIS — Z04.1 ENCOUNTER FOR EXAMINATION FOLLOWING MOTOR VEHICLE ACCIDENT (MVA): Primary | ICD-10-CM

## 2022-12-07 DIAGNOSIS — M25.561 CHRONIC PAIN OF RIGHT KNEE: ICD-10-CM

## 2022-12-07 DIAGNOSIS — I10 ESSENTIAL HYPERTENSION: ICD-10-CM

## 2022-12-07 DIAGNOSIS — G89.29 CHRONIC PAIN OF RIGHT KNEE: ICD-10-CM

## 2022-12-07 PROCEDURE — 1125F AMNT PAIN NOTED PAIN PRSNT: CPT | Performed by: FAMILY MEDICINE

## 2022-12-07 PROCEDURE — 99214 OFFICE O/P EST MOD 30 MIN: CPT | Performed by: FAMILY MEDICINE

## 2022-12-07 PROCEDURE — G0009 ADMIN PNEUMOCOCCAL VACCINE: HCPCS | Performed by: FAMILY MEDICINE

## 2022-12-07 PROCEDURE — 3074F SYST BP LT 130 MM HG: CPT | Performed by: FAMILY MEDICINE

## 2022-12-07 PROCEDURE — 90677 PCV20 VACCINE IM: CPT | Performed by: FAMILY MEDICINE

## 2022-12-07 PROCEDURE — 3079F DIAST BP 80-89 MM HG: CPT | Performed by: FAMILY MEDICINE

## 2022-12-08 ENCOUNTER — MED REC SCAN ONLY (OUTPATIENT)
Dept: FAMILY MEDICINE CLINIC | Facility: CLINIC | Age: 67
End: 2022-12-08

## 2022-12-13 ENCOUNTER — OFFICE VISIT (OUTPATIENT)
Dept: PHYSICAL MEDICINE AND REHAB | Facility: CLINIC | Age: 67
End: 2022-12-13
Payer: MEDICARE

## 2022-12-13 VITALS — WEIGHT: 209 LBS | HEIGHT: 65 IN | BODY MASS INDEX: 34.82 KG/M2

## 2022-12-13 DIAGNOSIS — M54.2 CHRONIC NECK PAIN: ICD-10-CM

## 2022-12-13 DIAGNOSIS — G89.29 CHRONIC NECK PAIN: ICD-10-CM

## 2022-12-13 DIAGNOSIS — M54.16 RIGHT LUMBAR RADICULOPATHY: Primary | ICD-10-CM

## 2022-12-13 PROCEDURE — 3008F BODY MASS INDEX DOCD: CPT | Performed by: PHYSICAL MEDICINE & REHABILITATION

## 2022-12-13 PROCEDURE — 1125F AMNT PAIN NOTED PAIN PRSNT: CPT | Performed by: PHYSICAL MEDICINE & REHABILITATION

## 2022-12-13 PROCEDURE — 99214 OFFICE O/P EST MOD 30 MIN: CPT | Performed by: PHYSICAL MEDICINE & REHABILITATION

## 2022-12-13 NOTE — PATIENT INSTRUCTIONS
-MRI of the cervical spine and follow up after  -My office will call once injection is approved  -Continue Lyrica twice daily

## 2022-12-15 ENCOUNTER — TELEPHONE (OUTPATIENT)
Dept: NEUROLOGY | Facility: CLINIC | Age: 67
End: 2022-12-15

## 2022-12-15 NOTE — TELEPHONE ENCOUNTER
Right L4 and L5 Transforaminal Epidural Steroid Injection under fluoroscopy guidance     CPT CODE: 13372,11536,11421    Status: pending approval     [employer] online, to initiate authorization for request.     Pending Case# Tracking #[id number]     MD clinical notes pending

## 2022-12-19 RX ORDER — AMITRIPTYLINE HYDROCHLORIDE 25 MG/1
TABLET, FILM COATED ORAL
Qty: 90 TABLET | Refills: 0 | Status: SHIPPED | OUTPATIENT
Start: 2022-12-19

## 2022-12-19 NOTE — TELEPHONE ENCOUNTER
Refill Request    Medication request: amitriptyline 25 MG Oral Tab. Take 1 tablet (25 mg total) by mouth nightly. LOV: 12/13/2022 with [place]ino Smart D.O. Due back to clinic per last office note: Per Dr. Leanne Lucas: \" I recommended right epidural steroid injection which she she will be scheduled. I also recommend MRI imaging of the cervical spine for further evaluation and follow-up after. \"  Panchito Bound: 01/09/2023 with Chino Smart D.O. [place]: 10/09/2022 #90 - 90 day supply per [employer]    Urine drug screen (if applicable): n/a  Pain contract: n/a    LOV plan (if weaning or changing medications): Per Dr. Leanne Lucas: Mirtha Wallace will continue her current pain medications as tolerated. \"

## 2022-12-19 NOTE — TELEPHONE ENCOUNTER
Patient has been scheduled for Right L4 + L5 transforaminal epidural steroid injection on 1/9/22 at the [facility] with Dr. Jen Thornton.   -Anesthesia type: Local.  -Patient informed to fast 12 hours prior to procedure with IVCS/MAC.   -Scheduling [address] covid testing required for all procedures whether patient is vaccinated or not. -Patient was advised that if he/she does receive the covid vaccine it needs to be at least 2 weeks before or after the injection. -Medications and allergies reviewed. -Patient reminded to hold NSAIDs (Ibuprofen, ASA 81, Aleve, Naproxen, Mobic, Diclofenac, Etodolac, Celebrex etc.) for 3 days prior to Lumbar MBB/Facet if BMI is greater than 35. For Cervical injections only hold multivitamins, Vitamin E, Fish Oil, Phentermine/Lomaira for 7 days prior to injection and NSAIDS.  mg to be held for 7 days prior to injections.  -If patient is receiving MAC/IVCS Phentermine Ozie Ply) will need to be held for 7 days prior to injection.  -If on blood thinner clearance has been received to hold this medication by provider.   -Patient informed he/she will need a  to and from procedure. [facility] is located in the [facility] [address] 1st floor,  may park in the yellow/purple parking lot. Patient verbalized understanding and agrees with plan.  -----> Scheduled in Epic: Yes  -----> Scheduled in Casetabs:  Yes

## 2022-12-19 NOTE — TELEPHONE ENCOUNTER
LMTCB 1st attempt    Procedure: Right L4 + L5 transforaminal epidural steroid injection  Anesthesia: Local  Dx: M54.16  Location: [facility]  CPT Codes: [id number], [id number], [id number]

## 2022-12-29 ENCOUNTER — HOSPITAL ENCOUNTER (OUTPATIENT)
Dept: MRI IMAGING | Facility: HOSPITAL | Age: 67
Discharge: HOME | End: 2022-12-29
Attending: PHYSICAL MEDICINE & REHABILITATION
Payer: MEDICARE

## 2022-12-29 DIAGNOSIS — G89.29 CHRONIC NECK PAIN: ICD-10-CM

## 2022-12-29 DIAGNOSIS — M54.2 CHRONIC NECK PAIN: ICD-10-CM

## 2022-12-29 PROCEDURE — 72141 MRI NECK SPINE W/O DYE: CPT | Performed by: PHYSICAL MEDICINE & REHABILITATION

## 2022-12-31 DIAGNOSIS — I10 ESSENTIAL HYPERTENSION: ICD-10-CM

## 2022-12-31 RX ORDER — HYDROCHLOROTHIAZIDE 12.5 MG/1
12.5 TABLET ORAL DAILY
Qty: 90 TABLET | Refills: 1 | Status: SHIPPED | OUTPATIENT
Start: 2022-12-31
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
## 2023-01-09 ENCOUNTER — OFFICE VISIT (OUTPATIENT)
Dept: SURGERY | Facility: CLINIC | Age: 68
End: 2023-01-09
Payer: MEDICARE

## 2023-01-09 DIAGNOSIS — M47.816 FACET ARTHROPATHY, LUMBAR: Primary | ICD-10-CM

## 2023-01-09 PROCEDURE — 64483 NJX AA&/STRD TFRM EPI L/S 1: CPT | Performed by: PHYSICAL MEDICINE & REHABILITATION

## 2023-01-09 PROCEDURE — 64484 NJX AA&/STRD TFRM EPI L/S EA: CPT | Performed by: PHYSICAL MEDICINE & REHABILITATION

## 2023-01-09 NOTE — PROCEDURES
[address]    2-LEVEL LUMBAR TRANSFORAMINAL   NAME:  Garry Amin    MR #:    [id number] :  1/15/1955     PHYSICIAN:  Mannie Rizvi. Contreras Lee DO        Operative Report    DATE OF PROCEDURE: 2023   PREOPERATIVE DIAGNOSES: 1. Facet arthropathy, lumbar        POSTOPERATIVE DIAGNOSES:   1. Facet arthropathy, lumbar        PROCEDURES: Right L4 and L5 transforaminal epidural steroid injections done under fluoroscopic guidance with contrast enhancement. SURGEON: Mannie Lee DO   ANESTHESIA: Local   INDICATIONS:      OPERATIVE PROCEDURE:  Written consent was obtained from the patient. The patient was brought into the operating room and placed in the prone position on the fluoroscopy table with pillow underneath the abdomen. The patient's skin was cleaned and draped in a normal sterile fashion. Using AP fluoroscopy, all five lumbar vertebrae were identified. When the fourth and fifth vertebrae were identified, fluoroscopy was left anterior obliqued opening up the right L4-5 and right L5-S1 intervertebral foramen. At this point in time, each site was anesthetized with 1% PF lidocaine without epinephrine. Then, 5 inch, 22-gauge spinal needles were inserted and directed towards the right L4-5 and right L5-S1 intervertebral foramen. When they felt to be in good position, AP fluoroscopy was used to advance the needles to the 6 o'clock position on the right L4 and right L5 pedicles. At this point in time, Omnipaque-240 contrast was used to obtain a good epidurogram indicating correct needle placement at each level. Then, aspiration was performed. No blood, fluid, or air was aspirated. Then, the patient was injected with a 4 cc solution of 2 cc of 6 mg/cc of Celestone and 2 cc of 1% PF lidocaine without epinephrine at each site. After this, the needles were removed. Each site was cleaned. Band-Aids were applied. The patient was transferred to the cart and into [facility].   The patient was given discharge instructions and will follow up in the clinic as scheduled. Throughout the whole procedure, the patient's pulse oximetry and vital signs were monitored and they remained completely stable. Also, throughout the whole procedure, prior to injection of any medication, aspiration was performed. No blood, fluid, or air was aspirated at anytime.     Master Guy DO, FAAPMR & CAQSM  Physical Medicine and Rehabilitation/Sports Medicine  MEDICAL CENTER [facility]

## 2023-01-11 ENCOUNTER — TELEPHONE (OUTPATIENT)
Dept: NEPHROLOGY | Facility: CLINIC | Age: 68
End: 2023-01-11

## 2023-01-11 ENCOUNTER — LAB ENCOUNTER (OUTPATIENT)
Dept: LAB | Facility: HOSPITAL | Age: 68
End: 2023-01-11
Attending: INTERNAL MEDICINE
Payer: MEDICARE

## 2023-01-11 ENCOUNTER — OFFICE VISIT (OUTPATIENT)
Dept: NEPHROLOGY | Facility: CLINIC | Age: 68
End: 2023-01-11

## 2023-01-11 VITALS
BODY MASS INDEX: 35.82 KG/M2 | SYSTOLIC BLOOD PRESSURE: 136 MMHG | HEART RATE: 77 BPM | WEIGHT: 215 LBS | DIASTOLIC BLOOD PRESSURE: 80 MMHG | HEIGHT: 65 IN

## 2023-01-11 DIAGNOSIS — R53.83 OTHER FATIGUE: ICD-10-CM

## 2023-01-11 DIAGNOSIS — N18.31 STAGE 3A CHRONIC KIDNEY DISEASE (HCC): ICD-10-CM

## 2023-01-11 DIAGNOSIS — N18.31 STAGE 3A CHRONIC KIDNEY DISEASE (HCC): Primary | ICD-10-CM

## 2023-01-11 LAB
ALBUMIN SERPL-MCNC: 3.6 G/DL (ref 3.4–5)
ANION GAP SERPL CALC-SCNC: 4 MMOL/L (ref 0–18)
BASOPHILS # BLD AUTO: 0.01 X10(3) UL (ref 0–0.2)
BASOPHILS NFR BLD AUTO: 0.1 %
BUN BLD-MCNC: 15 MG/DL (ref 7–18)
BUN/CREAT SERPL: 15.3 (ref 10–20)
CALCIUM BLD-MCNC: 8.9 MG/DL (ref 8.5–10.1)
CHLORIDE SERPL-SCNC: 106 MMOL/L (ref 98–112)
CO2 SERPL-SCNC: 29 MMOL/L (ref 21–32)
CREAT BLD-MCNC: 0.98 MG/DL
DEPRECATED RDW RBC AUTO: 47.1 FL (ref 35.1–46.3)
EOSINOPHIL # BLD AUTO: 0.02 X10(3) UL (ref 0–0.7)
EOSINOPHIL NFR BLD AUTO: 0.2 %
ERYTHROCYTE [DISTWIDTH] IN BLOOD BY AUTOMATED COUNT: 13.2 % (ref 11–15)
GFR SERPLBLD BASED ON 1.73 SQ M-ARVRAT: 63 ML/MIN/1.73M2 (ref 60–?)
GLUCOSE BLD-MCNC: 113 MG/DL (ref 70–99)
HCT VFR BLD AUTO: 37.9 %
HGB BLD-MCNC: 12.1 G/DL
IMM GRANULOCYTES # BLD AUTO: 0.06 X10(3) UL (ref 0–1)
IMM GRANULOCYTES NFR BLD: 0.6 %
LYMPHOCYTES # BLD AUTO: 1.9 X10(3) UL (ref 1–4)
LYMPHOCYTES NFR BLD AUTO: 17.9 %
MCH RBC QN AUTO: 31.1 PG (ref 26–34)
MCHC RBC AUTO-ENTMCNC: 31.9 G/DL (ref 31–37)
MCV RBC AUTO: 97.4 FL
MONOCYTES # BLD AUTO: 0.66 X10(3) UL (ref 0.1–1)
MONOCYTES NFR BLD AUTO: 6.2 %
NEUTROPHILS # BLD AUTO: 7.97 X10 (3) UL (ref 1.5–7.7)
NEUTROPHILS # BLD AUTO: 7.97 X10(3) UL (ref 1.5–7.7)
NEUTROPHILS NFR BLD AUTO: 75 %
OSMOLALITY SERPL CALC.SUM OF ELEC: 290 MOSM/KG (ref 275–295)
PHOSPHATE SERPL-MCNC: 2.3 MG/DL (ref 2.5–4.9)
PLATELET # BLD AUTO: 261 10(3)UL (ref 150–450)
POTASSIUM SERPL-SCNC: 4.5 MMOL/L (ref 3.5–5.1)
RBC # BLD AUTO: 3.89 X10(6)UL
SODIUM SERPL-SCNC: 139 MMOL/L (ref 136–145)
TSI SER-ACNC: 0.66 MIU/ML (ref 0.36–3.74)
WBC # BLD AUTO: 10.6 X10(3) UL (ref 4–11)

## 2023-01-11 PROCEDURE — 99214 OFFICE O/P EST MOD 30 MIN: CPT | Performed by: INTERNAL MEDICINE

## 2023-01-11 PROCEDURE — 3075F SYST BP GE 130 - 139MM HG: CPT | Performed by: INTERNAL MEDICINE

## 2023-01-11 PROCEDURE — 3008F BODY MASS INDEX DOCD: CPT | Performed by: INTERNAL MEDICINE

## 2023-01-11 PROCEDURE — 85025 COMPLETE CBC W/AUTO DIFF WBC: CPT

## 2023-01-11 PROCEDURE — 3079F DIAST BP 80-89 MM HG: CPT | Performed by: INTERNAL MEDICINE

## 2023-01-11 PROCEDURE — 84443 ASSAY THYROID STIM HORMONE: CPT

## 2023-01-11 PROCEDURE — 80069 RENAL FUNCTION PANEL: CPT

## 2023-01-11 PROCEDURE — 36415 COLL VENOUS BLD VENIPUNCTURE: CPT

## 2023-01-12 NOTE — TELEPHONE ENCOUNTER
Kidney function has improved. Essentially back to normal.  Let her know she is not anemic and thyroid levels were normal.  Just have her repeat her kidney standing order in 6 months to ensure stability. She should follow-up with her PCP regarding her weight gain and chronic fatigue.

## 2023-01-12 NOTE — PROGRESS NOTES
01/11/23        Patient: Shraddha Morse   YOB: 1955   Date of Visit: 1/11/2023       Dear  Dr. Jesus Alberto Russ,      Thank you for referring Shraddha Morse to my practice. Please find my assessment and plan below. As you know she is a 59-year-old -American female with a history of mild hypertension, chronic low back pain and chronic right knee pain who I now the pleasure of seeing for follow-up of chronic kidney disease stage III. Overall the patient states she has been doing well without any chest pain, shortness of breath, GI or urinary tract symptoms. Still having ongoing problems with her low back and just had a recent epidural injection. On physical exam her blood pressure is 136/80 with a pulse of 77 and she weighs 215 pounds. Her neck was supple without JVD. Lungs were clear. Heart revealed a regular rate and rhythm with an S4 but no gallops, murmurs or rubs. Abdomen was soft, flat, nontender without organomegaly, masses or bruits. Extremities revealed no clubbing, cyanosis or edema. The patient just had follow-up laboratory studies today. Her renal function actually is better and her creatinine is down to 0.98 with an estimated GFR of 63 cc/min. Potassium 4.5 and hemoglobin 12.1. She also wanted me to check her thyroid function as she is gaining weight and just feels tired. Her TSH though was normal as well. Therefore reassurance was given. We will just have her repeat a renal panel in 6 months to ensure stability. Blood pressures seem to be under adequate control. Avoid nonsteroidals. Maintain adequate hydration. She was advised to discuss with you regarding her chronic fatigue. Thank you again for allowing me to participate in the care of your patient. If you have any questions please feel free to call.            Sincerely,   Palomo Gomes MD   [address]  [address] [facility] [address]  [address] [facility] [address]    Document electronically generated by:  iLma Hess MD

## 2023-01-26 ENCOUNTER — OFFICE VISIT (OUTPATIENT)
Dept: FAMILY MEDICINE CLINIC | Facility: CLINIC | Age: 68
End: 2023-01-26

## 2023-01-26 VITALS
HEIGHT: 65 IN | DIASTOLIC BLOOD PRESSURE: 66 MMHG | HEART RATE: 82 BPM | WEIGHT: 213 LBS | BODY MASS INDEX: 35.49 KG/M2 | SYSTOLIC BLOOD PRESSURE: 119 MMHG | TEMPERATURE: 98 F

## 2023-01-26 DIAGNOSIS — M48.02 FORAMINAL STENOSIS OF CERVICAL REGION: ICD-10-CM

## 2023-01-26 DIAGNOSIS — M51.26 HNP (HERNIATED NUCLEUS PULPOSUS), LUMBAR: ICD-10-CM

## 2023-01-26 DIAGNOSIS — M48.02 CERVICAL STENOSIS OF SPINAL CANAL: ICD-10-CM

## 2023-01-26 DIAGNOSIS — M48.061 SPINAL STENOSIS AT L4-L5 LEVEL: ICD-10-CM

## 2023-01-26 DIAGNOSIS — R53.83 FATIGUE, UNSPECIFIED TYPE: ICD-10-CM

## 2023-01-26 DIAGNOSIS — Z12.11 COLON CANCER SCREENING: Primary | ICD-10-CM

## 2023-01-26 DIAGNOSIS — I10 ESSENTIAL HYPERTENSION: ICD-10-CM

## 2023-01-26 PROCEDURE — 3078F DIAST BP <80 MM HG: CPT | Performed by: FAMILY MEDICINE

## 2023-01-26 PROCEDURE — 99214 OFFICE O/P EST MOD 30 MIN: CPT | Performed by: FAMILY MEDICINE

## 2023-01-26 PROCEDURE — 3074F SYST BP LT 130 MM HG: CPT | Performed by: FAMILY MEDICINE

## 2023-01-26 PROCEDURE — 3008F BODY MASS INDEX DOCD: CPT | Performed by: FAMILY MEDICINE

## 2023-01-26 NOTE — PATIENT INSTRUCTIONS
Medication reviewed and renewed where needed and appropriate. Recommend weight loss via daily exercising and consistent healthy dietary changes. Monitor blood pressures and record at home. Limit salt intake. Comply with medications. Note of support for patient's disability during the time of December 29, 2021 through the month of April 2022.

## 2023-01-31 ENCOUNTER — OFFICE VISIT (OUTPATIENT)
Dept: PHYSICAL MEDICINE AND REHAB | Facility: CLINIC | Age: 68
End: 2023-01-31
Payer: MEDICARE

## 2023-01-31 ENCOUNTER — TELEPHONE (OUTPATIENT)
Dept: PHYSICAL MEDICINE AND REHAB | Facility: CLINIC | Age: 68
End: 2023-01-31

## 2023-01-31 VITALS
DIASTOLIC BLOOD PRESSURE: 86 MMHG | SYSTOLIC BLOOD PRESSURE: 136 MMHG | WEIGHT: 213 LBS | BODY MASS INDEX: 35.49 KG/M2 | HEIGHT: 65 IN

## 2023-01-31 DIAGNOSIS — M54.16 RIGHT LUMBAR RADICULOPATHY: ICD-10-CM

## 2023-01-31 DIAGNOSIS — M47.812 ARTHROPATHY OF CERVICAL FACET JOINT: Primary | ICD-10-CM

## 2023-01-31 DIAGNOSIS — G57.31 PERONEAL NEURITIS, RIGHT: ICD-10-CM

## 2023-01-31 PROCEDURE — 3008F BODY MASS INDEX DOCD: CPT | Performed by: PHYSICAL MEDICINE & REHABILITATION

## 2023-01-31 PROCEDURE — 99214 OFFICE O/P EST MOD 30 MIN: CPT | Performed by: PHYSICAL MEDICINE & REHABILITATION

## 2023-01-31 PROCEDURE — 3075F SYST BP GE 130 - 139MM HG: CPT | Performed by: PHYSICAL MEDICINE & REHABILITATION

## 2023-01-31 PROCEDURE — 3079F DIAST BP 80-89 MM HG: CPT | Performed by: PHYSICAL MEDICINE & REHABILITATION

## 2023-01-31 NOTE — TELEPHONE ENCOUNTER
Initiated authorization for Bilateral C4-5, C5-6, C6-7 Facet joint injection under fluoroscopy guidance CPT 76910-84+70670 dx:M47.812 to be done at [address] with [employer] requested clinicals-clinicals faxed to [phone]  Status: pending Tracking #[id number]    Per [employer] Guidelines-only 2 levels covered

## 2023-02-02 NOTE — TELEPHONE ENCOUNTER
Bilateral C4-5, C5-6, C6-7 Facet joint injection under fluoroscopy guidance pending (pls note only 2 levels requested d/t [employer] guidelines)

## 2023-02-06 NOTE — TELEPHONE ENCOUNTER
Bilateral C4-5, C5-6, C6-7 Facet joint injection under fluoroscopy guidance still pending per Nazia PRADO at  [address], [address] were received and determination will be made on or before 2/14/23

## 2023-02-15 NOTE — TELEPHONE ENCOUNTER
Bilateral C4-5, C5-6, C6-7 Facet joint injection under fluoroscopy guidance Denied #[id number] tracking #[id number]  I. Medicare will consider facet joint injections medically reasonable and necessary for the diagnosis  and treatment of chronic back pain in patients who meet ALL four (4) of the following:  A. Pain present for minimum of three (3) months with documented (written in the medical record)  failure to respond to noninvasive (not surgery) conservative management (as tolerated)  B. There is no non-facet pathology (pain not related to facet joints) per clinical assessment or  radiology studies (X-ray, computed tomography [CT] or magnetic resonance imaging [MRI]) that  could explain the source of the patient's pain, including but not limited to fracture (break), tumor,  infection, or significant deformity  II. Diagnostic (testing) procedures: Medicare considers intraarticular (IA) (within the joint) facet  block(s) reasonable and necessary as a diagnostic test only for the following:  A. When medial branch blocks (MBB) cannot be performed due to specific documented anatomic  restrictions (issues with formations in the body)  B. There is an indication (reason) to proceed with therapeutic (treatment) intraarticular (within the  joint) injections  C. Diagnostic (testing) procedures should be performed with the intent that if successful,  radiofrequency ablation (RFA) procedure would be considered the primary treatment goal at the  diagnosed level(s)  D. A second diagnostic (testing) facet procedure is considered medically necessary to be sure that the  first diagnostic procedure was helpful. The second injection should be placed in the same area as the  first. The second diagnostic (testing) procedure may only be performed at least 2 weeks after the  initial diagnostic procedure  III. For diagnostic (testing) facet joint procedures:  A.  For the first diagnostic (testing) facet joint procedure to be considered medically reasonable and  necessary, the patient must meet criteria A-D outlined under Section I (one) above  B. A second confirmatory diagnostic facet joint procedure (to verify that the first injection relieved  pain) is considered medically reasonable and necessary in patients who meet ALL the following  criteria:  1. The patient meets the criteria for the first diagnostic (testing) procedure  2. After the first diagnostic (testing) facet joint procedure, there must be at least 80% relief of  primary (main) pain consistently (regularly). The pain relief should be appropriate for the type of  medication used  3. For each covered spinal region no more than four (4) diagnostic (testing) joint sessions will be  reimbursed per rolling 12 months (12 straight months; not a calendar year)  Prior to making our decision, we did reach out to your physician. However, based on the clinical  information we received, your request is not medically necessary. You may discuss this with your  physician.     Next step is to Appeal with Jake (p [phone]) per Radha Montez at [facility]

## 2023-02-20 NOTE — TELEPHONE ENCOUNTER
[employer] Appeal Form started, will need patients permission to file an appeal on her behalf-(pt need to sign Appeal form)    LM with patient informing her of the above

## 2023-04-12 ENCOUNTER — TELEPHONE (OUTPATIENT)
Dept: FAMILY MEDICINE CLINIC | Facility: CLINIC | Age: 68
End: 2023-04-12

## 2023-05-10 ENCOUNTER — HOSPITAL ENCOUNTER (EMERGENCY)
Facility: HOSPITAL | Age: 68
Discharge: HOME | End: 2023-05-11
Attending: STUDENT IN AN ORGANIZED HEALTH CARE EDUCATION/TRAINING PROGRAM
Payer: COMMERCIAL

## 2023-05-10 ENCOUNTER — APPOINTMENT (OUTPATIENT)
Dept: GENERAL RADIOLOGY | Facility: HOSPITAL | Age: 68
End: 2023-05-10
Attending: STUDENT IN AN ORGANIZED HEALTH CARE EDUCATION/TRAINING PROGRAM
Payer: COMMERCIAL

## 2023-05-10 ENCOUNTER — APPOINTMENT (OUTPATIENT)
Dept: CT IMAGING | Facility: HOSPITAL | Age: 68
End: 2023-05-10
Attending: STUDENT IN AN ORGANIZED HEALTH CARE EDUCATION/TRAINING PROGRAM
Payer: COMMERCIAL

## 2023-05-10 DIAGNOSIS — M54.50 ACUTE LEFT-SIDED LOW BACK PAIN WITHOUT SCIATICA: ICD-10-CM

## 2023-05-10 DIAGNOSIS — S80.00XA CONTUSION OF KNEE, UNSPECIFIED LATERALITY, INITIAL ENCOUNTER: Primary | ICD-10-CM
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
## 2023-05-10 DIAGNOSIS — S16.1XXA STRAIN OF NECK MUSCLE, INITIAL ENCOUNTER: ICD-10-CM

## 2023-05-10 PROCEDURE — 96372 THER/PROPH/DIAG INJ SC/IM: CPT

## 2023-05-10 PROCEDURE — 70450 CT HEAD/BRAIN W/O DYE: CPT | Performed by: STUDENT IN AN ORGANIZED HEALTH CARE EDUCATION/TRAINING PROGRAM

## 2023-05-10 PROCEDURE — 93005 ELECTROCARDIOGRAM TRACING: CPT

## 2023-05-10 PROCEDURE — 99284 EMERGENCY DEPT VISIT MOD MDM: CPT

## 2023-05-10 PROCEDURE — 73560 X-RAY EXAM OF KNEE 1 OR 2: CPT | Performed by: STUDENT IN AN ORGANIZED HEALTH CARE EDUCATION/TRAINING PROGRAM

## 2023-05-10 PROCEDURE — 99285 EMERGENCY DEPT VISIT HI MDM: CPT

## 2023-05-10 PROCEDURE — 73590 X-RAY EXAM OF LOWER LEG: CPT | Performed by: STUDENT IN AN ORGANIZED HEALTH CARE EDUCATION/TRAINING PROGRAM

## 2023-05-10 PROCEDURE — 93010 ELECTROCARDIOGRAM REPORT: CPT

## 2023-05-10 RX ORDER — MORPHINE SULFATE 4 MG/ML
4 INJECTION, SOLUTION INTRAMUSCULAR; INTRAVENOUS ONCE
Status: COMPLETED | OUTPATIENT
Start: 2023-05-10 | End: 2023-05-11

## 2023-05-10 RX ORDER — HYDROCODONE BITARTRATE AND ACETAMINOPHEN 5; 325 MG/1; MG/1
1 TABLET ORAL ONCE
Status: COMPLETED | OUTPATIENT
Start: 2023-05-10 | End: 2023-05-10

## 2023-05-11 ENCOUNTER — APPOINTMENT (OUTPATIENT)
Dept: CT IMAGING | Facility: HOSPITAL | Age: 68
End: 2023-05-11
Attending: STUDENT IN AN ORGANIZED HEALTH CARE EDUCATION/TRAINING PROGRAM
Payer: COMMERCIAL

## 2023-05-11 VITALS
OXYGEN SATURATION: 99 % | TEMPERATURE: 98 F | WEIGHT: 195 LBS | BODY MASS INDEX: 32.49 KG/M2 | RESPIRATION RATE: 16 BRPM | SYSTOLIC BLOOD PRESSURE: 144 MMHG | HEART RATE: 62 BPM | DIASTOLIC BLOOD PRESSURE: 68 MMHG | HEIGHT: 65 IN

## 2023-05-11 LAB
ATRIAL RATE: 68 BPM
P AXIS: 49 DEGREES
P-R INTERVAL: 176 MS
Q-T INTERVAL: 412 MS
QRS DURATION: 74 MS
QTC CALCULATION (BEZET): 438 MS
R AXIS: -9 DEGREES
T AXIS: 38 DEGREES
VENTRICULAR RATE: 68 BPM

## 2023-05-11 PROCEDURE — 72131 CT LUMBAR SPINE W/O DYE: CPT | Performed by: STUDENT IN AN ORGANIZED HEALTH CARE EDUCATION/TRAINING PROGRAM

## 2023-05-11 PROCEDURE — 72125 CT NECK SPINE W/O DYE: CPT | Performed by: STUDENT IN AN ORGANIZED HEALTH CARE EDUCATION/TRAINING PROGRAM

## 2023-05-11 RX ORDER — LIDOCAINE 50 MG/G
1 PATCH TOPICAL EVERY 24 HOURS
Qty: 7 PATCH | Refills: 0 | Status: SHIPPED | OUTPATIENT
Start: 2023-05-11 | End: 2023-05-18

## 2023-05-11 NOTE — ED INITIAL ASSESSMENT (HPI)
Pt presents from home with c/o right sided neck,  bilateral knee and hip pain, right ear clogged and chest pain after falling onto cement and losing consciousness. Pt reports it was a mechanical fall tripping over a piece of side walk. Pt unsure how long she was down.  present with pt during fall. No visual injury to face or head but pt reports striking head. Neg for thinners.

## 2023-05-11 NOTE — DISCHARGE INSTRUCTIONS
.Thank you for seeking care at [address] [place] [address] Emergency Department. As discussed, you should take Ibuprofen (also called Advil or Motrin) or Naproxen (also called Aleve) for your pain. If you are taking Ibuprofen, you can take 600 mg every 6 hours, or 800 mg every 8 hours. If you are taking Naproxen, you can take 500 mg every 12 hours. Do not take more than this amount as it can cause kidney problems or bleeding in your stomach. Do not take these medications at the same time as it can increase your risk for these side effects. If you have a history of heart problems or have had uncontrolled blood pressure for a significant period of time you should not take these medications as it can increase your risk for heart attack or stroke. If your pain is not controlled you can also take Acetaminophen (also called Tylenol) 1 gram every 6 hours. Do not take more than 4 grams over the course of a day from all medications you take. You can take this medication in alternation with Ibuprofen so that every 3 hours you are taking either 600 mg of Ibuprofen or 1 gram of Acetaminophen. As previously advised, please follow up with your primary care doctor for further management of your pain.

## 2023-05-12 ENCOUNTER — PATIENT OUTREACH (OUTPATIENT)
Dept: CASE MANAGEMENT | Age: 68
End: 2023-05-12

## 2023-05-12 ENCOUNTER — TELEPHONE (OUTPATIENT)
Dept: FAMILY MEDICINE CLINIC | Facility: CLINIC | Age: 68
End: 2023-05-12

## 2023-05-12 NOTE — TELEPHONE ENCOUNTER
Central scheduling called to schedule 1 week ER follow up appointment with Dr. Jeovanny Lu. No openings until July, did offer Lesle Srini however per central scheduling patient does not want to see anyone else but Dr. Jeovanny Lu. Are we able to accommodate patient for a sooner appointment?

## 2023-05-12 NOTE — PROGRESS NOTES
1st attempt ED [facility] apt request    Vincent Brown  PCP  [address]  [phone]  Follow up in 1 week  Office to call -availability    Confirmed w/ pt  Closing encounter

## 2023-05-13 NOTE — TELEPHONE ENCOUNTER
Friday, 05/19/2023 @ 11:40 am. Double book the slot and let her know I will see her around noon on that day.

## 2023-05-15 ENCOUNTER — OFFICE VISIT (OUTPATIENT)
Dept: SURGERY | Facility: CLINIC | Age: 68
End: 2023-05-15
Payer: MEDICARE

## 2023-05-15 DIAGNOSIS — M47.812 ARTHROPATHY OF CERVICAL FACET JOINT: Primary | ICD-10-CM

## 2023-05-15 NOTE — PROCEDURES
[facility]    CERVICAL Z-JOINT/FACET INJECTION  NAME:  Vida Mcfadden    MR #:    [id number] :  1/15/1955     PHYSICIAN:  Oralia Cadet. Margaret Frey DO        Operative Report    DATE OF PROCEDURE: 5/15/2023   PREOPERATIVE DIAGNOSES: 1. Arthropathy of cervical facet joint        POSTOPERATIVE DIAGNOSES:   1. Arthropathy of cervical facet joint        PROCEDURES: bilateral C4-5 and C5-6 Z-Joint Injection done under fluoroscopic guidance with contrast enhancement. SURGEON: Oralia Frey DO   ANESTHESIA: Local   INDICATIONS:      OPERATIVE PROCEDURE:  Written consent was obtained from the patient. The patient was brought into the operating room and placed in the prone position on the fluoroscopy table with pillow underneath the chest and shoulders. The patient's skin was cleaned and draped in a normal sterile fashion. Using AP fluoroscopy, the bilateral C4-5 and C5-6 z-joints were identified. Skin was anesthetized with 1% PF lidocaine without epinephrine overlying each joint. Then, a 3-1/2 inch, 22-gauge spinal needle was inserted and directed towards the bilateral C4-5 and C5-6 z-joint(s). When they were engaged, Omnipaque-240 contrast was used to obtain a good arthrogram indicating correct needle placement. Then, aspiration was performed. No blood, fluid, or air was aspirated. Then, each joint was injected with a 1 cc solution of 0.5 cc of 1% PF lidocaine without epinephrine and 0.5 cc of 1 mg/cc of 40 mg of Kenalog/cc. Then, the needles were removed. The patient's skin was cleaned. Band-Aids were applied. The patient was transferred to the cart and into [facility]. The patient was given discharge instructions and will follow up in the clinic as scheduled. Throughout the whole procedure, the patient's pulse oximetry and vital signs were monitored and they remained completely stable. Also, throughout the whole procedure, prior to injection of any medication, aspiration was performed.   No blood, fluid, or air was aspirated at anytime.     Zohra Smith DO, FAAPMR & CAQSM  Physical Medicine and Rehabilitation/Sports Medicine  MEDICAL CENTER [facility] ER

## 2023-05-16 NOTE — TELEPHONE ENCOUNTER
Future Appointments   Date Time Provider Elizabeth Reyes   5/18/2023 10:00 AM Svetlana Sotelos, [facility]   6/6/2023 11:40 AM Manjula Sanches, you offered a double-book on 5/19 but then the above appointment had opened-up therefore we presumed that would be more appropriate.

## 2023-05-18 ENCOUNTER — HOSPITAL ENCOUNTER (OUTPATIENT)
Dept: BONE DENSITY | Facility: HOSPITAL | Age: 68
Discharge: HOME | End: 2023-05-18
Attending: FAMILY MEDICINE
Payer: MEDICARE

## 2023-05-18 ENCOUNTER — OFFICE VISIT (OUTPATIENT)
Dept: FAMILY MEDICINE CLINIC | Facility: CLINIC | Age: 68
End: 2023-05-18

## 2023-05-18 VITALS
SYSTOLIC BLOOD PRESSURE: 130 MMHG | HEIGHT: 65 IN | TEMPERATURE: 97 F | BODY MASS INDEX: 34.99 KG/M2 | WEIGHT: 210 LBS | DIASTOLIC BLOOD PRESSURE: 83 MMHG | HEART RATE: 59 BPM

## 2023-05-18 DIAGNOSIS — H69.83 DYSFUNCTION OF BOTH EUSTACHIAN TUBES: ICD-10-CM

## 2023-05-18 DIAGNOSIS — R09.81 NASAL SINUS CONGESTION: ICD-10-CM

## 2023-05-18 DIAGNOSIS — M51.26 HNP (HERNIATED NUCLEUS PULPOSUS), LUMBAR: ICD-10-CM

## 2023-05-18 DIAGNOSIS — Z78.0 ENCOUNTER FOR OSTEOPOROSIS SCREENING IN ASYMPTOMATIC POSTMENOPAUSAL PATIENT: ICD-10-CM

## 2023-05-18 DIAGNOSIS — S16.1XXD STRAIN OF NECK MUSCLE, SUBSEQUENT ENCOUNTER: ICD-10-CM

## 2023-05-18 DIAGNOSIS — S13.4XXD WHIPLASH INJURY TO NECK, SUBSEQUENT ENCOUNTER: Primary | ICD-10-CM

## 2023-05-18 DIAGNOSIS — M51.36 BULGE OF LUMBAR DISC WITHOUT MYELOPATHY: ICD-10-CM

## 2023-05-18 DIAGNOSIS — Z13.820 ENCOUNTER FOR OSTEOPOROSIS SCREENING IN ASYMPTOMATIC POSTMENOPAUSAL PATIENT: ICD-10-CM

## 2023-05-18 PROCEDURE — 77080 DXA BONE DENSITY AXIAL: CPT | Performed by: FAMILY MEDICINE

## 2023-05-18 PROCEDURE — 1159F MED LIST DOCD IN RCRD: CPT | Performed by: FAMILY MEDICINE

## 2023-05-18 PROCEDURE — 3075F SYST BP GE 130 - 139MM HG: CPT | Performed by: FAMILY MEDICINE

## 2023-05-18 PROCEDURE — 1125F AMNT PAIN NOTED PAIN PRSNT: CPT | Performed by: FAMILY MEDICINE

## 2023-05-18 PROCEDURE — 3008F BODY MASS INDEX DOCD: CPT | Performed by: FAMILY MEDICINE

## 2023-05-18 PROCEDURE — 3079F DIAST BP 80-89 MM HG: CPT | Performed by: FAMILY MEDICINE

## 2023-05-18 PROCEDURE — 99214 OFFICE O/P EST MOD 30 MIN: CPT | Performed by: FAMILY MEDICINE

## 2023-05-18 RX ORDER — FLUTICASONE PROPIONATE 50 MCG
2 SPRAY, SUSPENSION (ML) NASAL DAILY
Qty: 16 G | Refills: 1 | Status: SHIPPED | OUTPATIENT
Start: 2023-05-18 | End: 2024-05-12

## 2023-05-18 RX ORDER — LEVOCETIRIZINE DIHYDROCHLORIDE 5 MG/1
5 TABLET, FILM COATED ORAL EVERY EVENING
Qty: 30 TABLET | Refills: 1 | Status: SHIPPED | OUTPATIENT
Start: 2023-05-18

## 2023-05-18 NOTE — PATIENT INSTRUCTIONS
Medication reviewed and renewed where needed and appropriate. Pain management via prescription medications as needed. Comply with medications. Monitor blood pressures and record at home. Limit salt intake. Letter of support relating to incapacity to work during the time stated. We will consider topical steroid on nasal spray along with Xyzal prescription for sinus decongestion. Patient encouraged to use [place] pot or nasal lavage to wash nasal passage and a portion of her sinuses.

## 2023-05-30 ENCOUNTER — NURSE TRIAGE (OUTPATIENT)
Dept: FAMILY MEDICINE CLINIC | Facility: CLINIC | Age: 68
End: 2023-05-30

## 2023-05-30 NOTE — TELEPHONE ENCOUNTER
Aiyana Lozano MA   5/30/2023 10:06 AM CDT Back to Top      Patient informed of message below , requesting to speak with a nurse in regards to vaginal symptoms.

## 2023-05-31 ENCOUNTER — OFFICE VISIT (OUTPATIENT)
Dept: FAMILY MEDICINE CLINIC | Facility: CLINIC | Age: 68
End: 2023-05-31

## 2023-05-31 VITALS
WEIGHT: 217 LBS | HEIGHT: 65 IN | TEMPERATURE: 97 F | BODY MASS INDEX: 36.15 KG/M2 | HEART RATE: 81 BPM | OXYGEN SATURATION: 98 % | SYSTOLIC BLOOD PRESSURE: 120 MMHG | DIASTOLIC BLOOD PRESSURE: 64 MMHG

## 2023-05-31 DIAGNOSIS — G89.29 CHRONIC RIGHT-SIDED LOW BACK PAIN, UNSPECIFIED WHETHER SCIATICA PRESENT: ICD-10-CM

## 2023-05-31 DIAGNOSIS — A60.00 GENITAL HERPES SIMPLEX, UNSPECIFIED SITE: Primary | ICD-10-CM

## 2023-05-31 DIAGNOSIS — M54.2 NECK PAIN: ICD-10-CM

## 2023-05-31 DIAGNOSIS — M54.50 CHRONIC RIGHT-SIDED LOW BACK PAIN, UNSPECIFIED WHETHER SCIATICA PRESENT: ICD-10-CM

## 2023-05-31 DIAGNOSIS — K64.9 HEMORRHOIDS, UNSPECIFIED HEMORRHOID TYPE: ICD-10-CM

## 2023-05-31 DIAGNOSIS — M25.561 ACUTE PAIN OF RIGHT KNEE: ICD-10-CM

## 2023-05-31 PROCEDURE — 3074F SYST BP LT 130 MM HG: CPT

## 2023-05-31 PROCEDURE — 99214 OFFICE O/P EST MOD 30 MIN: CPT

## 2023-05-31 PROCEDURE — 3078F DIAST BP <80 MM HG: CPT

## 2023-05-31 PROCEDURE — 1159F MED LIST DOCD IN RCRD: CPT

## 2023-05-31 PROCEDURE — 1160F RVW MEDS BY RX/DR IN RCRD: CPT

## 2023-05-31 PROCEDURE — 1170F FXNL STATUS ASSESSED: CPT

## 2023-05-31 PROCEDURE — 1125F AMNT PAIN NOTED PAIN PRSNT: CPT

## 2023-05-31 PROCEDURE — 3008F BODY MASS INDEX DOCD: CPT

## 2023-05-31 RX ORDER — VALACYCLOVIR HYDROCHLORIDE 500 MG/1
500 TABLET, FILM COATED ORAL 2 TIMES DAILY
Qty: 30 TABLET | Refills: 0 | Status: SHIPPED | OUTPATIENT
Start: 2023-05-31

## 2023-05-31 NOTE — PATIENT INSTRUCTIONS
An try over-the-counter Tuck Pads  Drink lots of water. Recommendations 60-90oz per day. High Fiber diet.

## 2023-06-06 ENCOUNTER — OFFICE VISIT (OUTPATIENT)
Dept: PHYSICAL MEDICINE AND REHAB | Facility: CLINIC | Age: 68
End: 2023-06-06
Payer: MEDICARE

## 2023-06-06 VITALS — HEART RATE: 79 BPM | HEIGHT: 65 IN | OXYGEN SATURATION: 99 % | BODY MASS INDEX: 36.15 KG/M2 | WEIGHT: 217 LBS

## 2023-06-06 DIAGNOSIS — M47.812 ARTHROPATHY OF CERVICAL FACET JOINT: Primary | ICD-10-CM

## 2023-06-06 DIAGNOSIS — M54.12 CERVICAL RADICULOPATHY: ICD-10-CM

## 2023-06-06 PROCEDURE — 99214 OFFICE O/P EST MOD 30 MIN: CPT | Performed by: PHYSICAL MEDICINE & REHABILITATION

## 2023-06-06 PROCEDURE — 3008F BODY MASS INDEX DOCD: CPT | Performed by: PHYSICAL MEDICINE & REHABILITATION

## 2023-06-06 PROCEDURE — 1125F AMNT PAIN NOTED PAIN PRSNT: CPT | Performed by: PHYSICAL MEDICINE & REHABILITATION

## 2023-06-06 PROCEDURE — 1160F RVW MEDS BY RX/DR IN RCRD: CPT | Performed by: PHYSICAL MEDICINE & REHABILITATION

## 2023-06-06 PROCEDURE — 1159F MED LIST DOCD IN RCRD: CPT | Performed by: PHYSICAL MEDICINE & REHABILITATION

## 2023-06-06 RX ORDER — PREGABALIN 150 MG/1
150 CAPSULE ORAL 2 TIMES DAILY
Qty: 60 CAPSULE | Refills: 0 | Status: SHIPPED | OUTPATIENT
Start: 2023-06-06

## 2023-06-06 RX ORDER — AMITRIPTYLINE HYDROCHLORIDE 25 MG/1
25 TABLET, FILM COATED ORAL NIGHTLY
Qty: 90 TABLET | Refills: 0 | Status: SHIPPED | OUTPATIENT
Start: 2023-06-06

## 2023-06-06 NOTE — PATIENT INSTRUCTIONS
-Start PT and home exercises for the neck  -Increase Lyrica to 150mg twice daily  -Continue Amitryptyline 25mg at nighttime

## 2023-06-30 ENCOUNTER — HOSPITAL ENCOUNTER (EMERGENCY)
Facility: HOSPITAL | Age: 68
Discharge: HOME | End: 2023-07-01
Attending: EMERGENCY MEDICINE
Payer: COMMERCIAL

## 2023-06-30 DIAGNOSIS — S06.0X1A CONCUSSION WITH LOSS OF CONSCIOUSNESS OF 30 MINUTES OR LESS, INITIAL ENCOUNTER: Primary | ICD-10-CM

## 2023-06-30 DIAGNOSIS — S16.1XXA STRAIN OF NECK MUSCLE, INITIAL ENCOUNTER: ICD-10-CM

## 2023-06-30 PROCEDURE — 99284 EMERGENCY DEPT VISIT MOD MDM: CPT

## 2023-07-01 ENCOUNTER — APPOINTMENT (OUTPATIENT)
Dept: CT IMAGING | Facility: HOSPITAL | Age: 68
End: 2023-07-01
Attending: EMERGENCY MEDICINE
Payer: COMMERCIAL

## 2023-07-01 VITALS
RESPIRATION RATE: 18 BRPM | TEMPERATURE: 98 F | OXYGEN SATURATION: 98 % | DIASTOLIC BLOOD PRESSURE: 70 MMHG | SYSTOLIC BLOOD PRESSURE: 144 MMHG | HEART RATE: 78 BPM

## 2023-07-01 PROCEDURE — 70450 CT HEAD/BRAIN W/O DYE: CPT | Performed by: EMERGENCY MEDICINE

## 2023-07-01 PROCEDURE — S0119 ONDANSETRON 4 MG: HCPCS | Performed by: EMERGENCY MEDICINE

## 2023-07-01 PROCEDURE — 72125 CT NECK SPINE W/O DYE: CPT | Performed by: EMERGENCY MEDICINE

## 2023-07-01 RX ORDER — DIAZEPAM 2 MG/1
2 TABLET ORAL 3 TIMES DAILY PRN
Qty: 12 TABLET | Refills: 0 | Status: SHIPPED | OUTPATIENT
Start: 2023-07-01

## 2023-07-01 RX ORDER — ACETAMINOPHEN 500 MG
1000 TABLET ORAL ONCE
Status: COMPLETED | OUTPATIENT
Start: 2023-07-01 | End: 2023-07-01

## 2023-07-01 RX ORDER — ONDANSETRON 4 MG/1
4 TABLET, ORALLY DISINTEGRATING ORAL EVERY 8 HOURS PRN
Qty: 20 TABLET | Refills: 0 | Status: SHIPPED | OUTPATIENT
Start: 2023-07-01 | End: 2023-07-07 | Stop reason: ALTCHOICE

## 2023-07-01 RX ORDER — ONDANSETRON 4 MG/1
4 TABLET, ORALLY DISINTEGRATING ORAL ONCE
Status: COMPLETED | OUTPATIENT
Start: 2023-07-01 | End: 2023-07-01

## 2023-07-01 NOTE — ED INITIAL ASSESSMENT (HPI)
Patient arrives ambulatory through triage with c/o of a fall that occurred yesterday. Patient states she hit her head lost consciousness has been nauseous since then, complains of r. Shoulder pain, neck pain, and head pain.

## 2023-07-01 NOTE — ED QUICK NOTES
States that she was shopping in a nursery and lost her ball ance and fell. States that she hit hurt head on \" rocks\". (+) LOC. C/O Head pain , neck pain and bilat shoulder pain . Has some occipital swelling. No other trauma noted. Desire Galvan

## 2023-07-07 ENCOUNTER — OFFICE VISIT (OUTPATIENT)
Dept: FAMILY MEDICINE CLINIC | Facility: CLINIC | Age: 68
End: 2023-07-07

## 2023-07-07 VITALS
SYSTOLIC BLOOD PRESSURE: 120 MMHG | BODY MASS INDEX: 33.66 KG/M2 | DIASTOLIC BLOOD PRESSURE: 64 MMHG | HEIGHT: 65 IN | HEART RATE: 76 BPM | WEIGHT: 202 LBS | OXYGEN SATURATION: 98 % | TEMPERATURE: 97 F

## 2023-07-07 DIAGNOSIS — S06.0X1D CONCUSSION WITH LOSS OF CONSCIOUSNESS OF 30 MINUTES OR LESS, SUBSEQUENT ENCOUNTER: Primary | ICD-10-CM

## 2023-07-07 DIAGNOSIS — M54.12 CERVICAL RADICULOPATHY: ICD-10-CM

## 2023-07-07 DIAGNOSIS — Z09 FOLLOW-UP EXAM: ICD-10-CM

## 2023-07-07 DIAGNOSIS — M48.02 CERVICAL STENOSIS OF SPINAL CANAL: ICD-10-CM

## 2023-07-07 PROCEDURE — 99214 OFFICE O/P EST MOD 30 MIN: CPT

## 2023-07-07 PROCEDURE — 3074F SYST BP LT 130 MM HG: CPT

## 2023-07-07 PROCEDURE — 3008F BODY MASS INDEX DOCD: CPT

## 2023-07-07 PROCEDURE — 3078F DIAST BP <80 MM HG: CPT

## 2023-07-07 PROCEDURE — 1170F FXNL STATUS ASSESSED: CPT

## 2023-07-07 PROCEDURE — 1159F MED LIST DOCD IN RCRD: CPT

## 2023-07-07 PROCEDURE — 1160F RVW MEDS BY RX/DR IN RCRD: CPT

## 2023-07-07 PROCEDURE — 1125F AMNT PAIN NOTED PAIN PRSNT: CPT

## 2023-07-07 RX ORDER — METHYLPREDNISOLONE 4 MG/1
TABLET ORAL
Qty: 21 EACH | Refills: 0 | Status: SHIPPED | OUTPATIENT
Start: 2023-07-07

## 2023-08-04 ENCOUNTER — HOSPITAL ENCOUNTER (EMERGENCY)
Facility: HOSPITAL | Age: 68
Discharge: HOME | End: 2023-08-05
Attending: EMERGENCY MEDICINE
Payer: COMMERCIAL

## 2023-08-04 DIAGNOSIS — R52 GENERALIZED PAIN: ICD-10-CM

## 2023-08-04 DIAGNOSIS — R51.9 ACUTE NONINTRACTABLE HEADACHE, UNSPECIFIED HEADACHE TYPE: Primary | ICD-10-CM

## 2023-08-04 PROCEDURE — 99283 EMERGENCY DEPT VISIT LOW MDM: CPT

## 2023-08-04 PROCEDURE — 96372 THER/PROPH/DIAG INJ SC/IM: CPT

## 2023-08-04 PROCEDURE — 99284 EMERGENCY DEPT VISIT MOD MDM: CPT

## 2023-08-04 RX ORDER — DIPHENHYDRAMINE HCL 25 MG
50 CAPSULE ORAL ONCE
Status: COMPLETED | OUTPATIENT
Start: 2023-08-04 | End: 2023-08-05

## 2023-08-04 RX ORDER — ACETAMINOPHEN 500 MG
1000 TABLET ORAL ONCE
Status: COMPLETED | OUTPATIENT
Start: 2023-08-04 | End: 2023-08-05

## 2023-08-04 RX ORDER — KETOROLAC TROMETHAMINE 15 MG/ML
15 INJECTION, SOLUTION INTRAMUSCULAR; INTRAVENOUS ONCE
Status: DISCONTINUED | OUTPATIENT
Start: 2023-08-04 | End: 2023-08-05

## 2023-08-05 VITALS
RESPIRATION RATE: 18 BRPM | WEIGHT: 207.69 LBS | HEART RATE: 72 BPM | OXYGEN SATURATION: 98 % | BODY MASS INDEX: 34.6 KG/M2 | DIASTOLIC BLOOD PRESSURE: 78 MMHG | HEIGHT: 65 IN | SYSTOLIC BLOOD PRESSURE: 136 MMHG | TEMPERATURE: 98 F

## 2023-08-05 RX ORDER — BUTALBITAL, ACETAMINOPHEN AND CAFFEINE 50; 325; 40 MG/1; MG/1; MG/1
1-2 TABLET ORAL EVERY 4 HOURS PRN
Qty: 20 TABLET | Refills: 0 | Status: SHIPPED | OUTPATIENT
Start: 2023-08-05 | End: 2023-08-23

## 2023-08-05 RX ORDER — KETOROLAC TROMETHAMINE 30 MG/ML
30 INJECTION, SOLUTION INTRAMUSCULAR; INTRAVENOUS ONCE
Status: COMPLETED | OUTPATIENT
Start: 2023-08-05 | End: 2023-08-05

## 2023-08-05 NOTE — ED INITIAL ASSESSMENT (HPI)
70y F to ED via personal car with c/o headache. Patient was in an accident about 1 month ago where rocks fell onto her head. Was examined at the time in ED. Patient with frequent headaches since then. Today's headache very intense, she reports it 9.5/10 to right side of head. Also with shooting pain down right side of body. Light sensitivity.

## 2023-08-07 ENCOUNTER — TELEPHONE (OUTPATIENT)
Dept: PHYSICAL MEDICINE AND REHAB | Facility: CLINIC | Age: 68
End: 2023-08-07

## 2023-08-07 ENCOUNTER — TELEPHONE (OUTPATIENT)
Dept: NEUROLOGY | Facility: CLINIC | Age: 68
End: 2023-08-07

## 2023-08-07 ENCOUNTER — TELEPHONE (OUTPATIENT)
Dept: FAMILY MEDICINE CLINIC | Facility: CLINIC | Age: 68
End: 2023-08-07

## 2023-08-07 ENCOUNTER — OFFICE VISIT (OUTPATIENT)
Dept: PHYSICAL MEDICINE AND REHAB | Facility: CLINIC | Age: 68
End: 2023-08-07
Payer: MEDICARE

## 2023-08-07 VITALS — HEART RATE: 62 BPM | DIASTOLIC BLOOD PRESSURE: 80 MMHG | OXYGEN SATURATION: 99 % | SYSTOLIC BLOOD PRESSURE: 136 MMHG

## 2023-08-07 DIAGNOSIS — G89.29 CHRONIC NECK PAIN: ICD-10-CM

## 2023-08-07 DIAGNOSIS — R51.9 NONINTRACTABLE HEADACHE, UNSPECIFIED CHRONICITY PATTERN, UNSPECIFIED HEADACHE TYPE: Primary | ICD-10-CM

## 2023-08-07 DIAGNOSIS — M54.2 CHRONIC NECK PAIN: ICD-10-CM

## 2023-08-07 DIAGNOSIS — M54.12 CERVICAL RADICULOPATHY: Primary | ICD-10-CM

## 2023-08-07 DIAGNOSIS — S06.9X0A TRAUMATIC BRAIN INJURY, WITHOUT LOSS OF CONSCIOUSNESS, INITIAL ENCOUNTER (HCC): ICD-10-CM

## 2023-08-07 DIAGNOSIS — M54.12 BRACHIAL NEURITIS: Primary | ICD-10-CM

## 2023-08-07 PROCEDURE — 3079F DIAST BP 80-89 MM HG: CPT | Performed by: PHYSICAL MEDICINE & REHABILITATION

## 2023-08-07 PROCEDURE — 99214 OFFICE O/P EST MOD 30 MIN: CPT | Performed by: PHYSICAL MEDICINE & REHABILITATION

## 2023-08-07 PROCEDURE — 1125F AMNT PAIN NOTED PAIN PRSNT: CPT | Performed by: PHYSICAL MEDICINE & REHABILITATION

## 2023-08-07 PROCEDURE — 1160F RVW MEDS BY RX/DR IN RCRD: CPT | Performed by: PHYSICAL MEDICINE & REHABILITATION

## 2023-08-07 PROCEDURE — 3075F SYST BP GE 130 - 139MM HG: CPT | Performed by: PHYSICAL MEDICINE & REHABILITATION

## 2023-08-07 PROCEDURE — 1159F MED LIST DOCD IN RCRD: CPT | Performed by: PHYSICAL MEDICINE & REHABILITATION

## 2023-08-07 RX ORDER — AMITRIPTYLINE HYDROCHLORIDE 25 MG/1
25 TABLET, FILM COATED ORAL NIGHTLY
Qty: 90 TABLET | Refills: 0 | Status: SHIPPED | OUTPATIENT
Start: 2023-08-07

## 2023-08-07 NOTE — TELEPHONE ENCOUNTER
Initiated authorization for C7-T1 Interlaminar (Right sided) Epidural Steroid Injection under fluoroscopy guidance CPT 28576 dx:M54.12 to be done at [address] with [employer]  Status: Approved valid 8/7/23-11/5/23  Tracking #[id number]  Please check back later for the auth number    Per Dr. Efrain Alfaro w/ JESSICA

## 2023-08-07 NOTE — TELEPHONE ENCOUNTER
Dr Larry Friedman, please advise    Patient (name and  verified) calling for neurology referral for her recent ER visit for Headache.     Referral pended for review/approval.      Future Appointments   Date Time Provider Elizabeth Reyes   2023  2:00 PM Daryn Avendano MD [facility]   2023 11:00 AM DO [facility] Smith

## 2023-08-07 NOTE — PROGRESS NOTES
[address]  OFFICE FOLLOW UP EVALUATION      HISTORY OF PRESENT ILLNESS:   Patient presents with: Follow - Up: Returning patient. LOV 6/6/23. C/o bilateral neck pain with N/T. Was seen at ED 6/30/23 and 8/4/23 after a fall. The most recent trip to ED due to increased headache. Notes \"striking pain\" on right side of head. Continues Amitriptyline and Lyrica 150mg BID. Rates pain 9/10. Patient is following up for right-sided neck pain and head injury. States she had a fall where she landed on her right sided head at the end of June. Since then she has been experiencing worsening headache and neck pain. She is noticing imbalance as well. Pain is very severe and affecting her ability to function. Pain is striking in nature at times. She takes amitriptyline and Lyrica which is doing well with her in terms of side effects and is providing some improvement with pain. However pain is still rated 9 out of 10. Since the onset, pain was flared up about a week ago and she presented to the ER where she had further imaging. She is scheduled to see neurology as well for this. She does notice some radiation in the right arm as well. She has persistent numbness in the right hand. She notices weakness in the hand as well. She denies any changes in bowel bladder. Denies any fevers or chills. PHYSICAL EXAM:   /80   Pulse 62   SpO2 99%     Restricted range of motion of the cervical spine in all planes with reproduction of symptoms. Positive Spurling's. Strength is 5 out of 5. Reflexes are symmetric    IMAGING:     CT cervical spine completed on 7/1/2023  Was reviewed CT imaging and is notable for the following    1. No acute fracture or subluxation. 2. Multilevel disc and facet degeneration with mild multilevel central narrowing.    3. Multilevel foraminal narrowing including :severe right C4, mild-to-moderate bilateral C5, severe left C6, mild to moderate bilateral C7 and severe left C8 foraminal narrowing       All imaging results were reviewed and discussed with patient. ASSESSMENT/PLAN:     1. Cervical radiculopathy    2. Chronic neck pain    3. Traumatic brain injury, without loss of consciousness, initial encounter [facility])        Lola Richmond is a 76year old female following up for right-sided neck pain after a fall landing on her right side with head injury. I believe she may have had a traumatic brain injury as well with concussion-like symptoms for which she is can follow-up with neurology. With regards to neck pain the cervical facet joint injections have provided some improvement in the past however I believe the pain may be from cervical radiculopathy as she has multilevel foraminal stenosis. I recommended a C7-T1 interlaminar epidural steroid injection under IV conscious sedation for situational anxiety. I advised her to continue amitriptyline at nighttime and Lyrica 150 mg 3 times daily. She will follow-up with me 2 weeks after the cervical epidural injection. The patient verbalized understanding with the plan and was in agreement. All questions/concerns were addressed and there were no barriers to learning. Please note Dragon dictation software was used to dictate this note and may result in inadvertent typos. Rhea Credit DO, FAAPMR & CAQSM  Physical Medicine and Rehabilitation  Sports and Spine Medicine    PAST MEDICAL HISTORY:     Past Medical History:   Diagnosis Date    Back pain     Essential hypertension          PAST SURGICAL HISTORY:   No past surgical history on file. CURRENT MEDICATIONS:     Current Outpatient Medications   Medication Sig Dispense Refill    amitriptyline 25 MG Oral Tab Take 1 tablet (25 mg total) by mouth nightly. 90 tablet 0    butalbital-acetaminophen-caffeine -40 MG Oral Tab Take 1-2 tablets by mouth every 4 (four) hours as needed for Headaches.  20 tablet 0    methylPREDNISolone (MEDROL) 4 MG Oral Tablet Therapy Pack As directed. 21 each 0    diazePAM 2 MG Oral Tab Take 1 tablet (2 mg total) by mouth 3 (three) times daily as needed (muscle spasm). 12 tablet 0    pregabalin (LYRICA) 150 MG Oral Cap Take 1 capsule (150 mg total) by mouth 2 (two) times daily. 60 capsule 0    levocetirizine 5 MG Oral Tab Take 1 tablet (5 mg total) by mouth every evening. 30 tablet 1    fluticasone propionate 50 MCG/ACT Nasal Suspension 2 sprays by Each Nare route daily. 16 g 1    hydroCHLOROthiazide 12.5 MG Oral Tab Take 1 tablet (12.5 mg total) by mouth daily. 90 tablet 1    lidocaine 5 % External Ointment Apply 1 Application topically as needed. 240 g 0    acetaminophen 500 MG Oral Tab Take 1 tablet (500 mg total) by mouth every 6 (six) hours as needed for Pain. Misc. Devices [facility]) Does not apply Misc Use as discussed. 1 each 0         ALLERGIES:   No Known Allergies      FAMILY HISTORY:   No family history on file. SOCIAL HISTORY:     Social History     Socioeconomic History    Marital status:    Tobacco Use    Smoking status: Never    Smokeless tobacco: Never   Vaping Use    Vaping Use: Never used   Substance and Sexual Activity    Alcohol use: Never    Drug use: Never   Other Topics Concern    Caffeine Concern Yes     Comment: coffee and tea    Exercise Yes     Comment: PT   Social History Narrative    The patient uses the following assistive device(s):  single-point cane. The patient does live in a home with stairs. REVIEW OF SYSTEMS:   A comprehensive 10 point review of systems was completed. Pertinent positives and negatives noted in the the HPI.       LABS:   No results found for: EAG, A1C  Lab Results   Component Value Date    WBC 10.6 01/11/2023    RBC 3.89 01/11/2023    HGB 12.1 01/11/2023    HCT 37.9 01/11/2023    MCV 97.4 01/11/2023    MCH 31.1 01/11/2023    MCHC 31.9 01/11/2023    RDW 13.2 01/11/2023    .0 01/11/2023    MPV 7.1 (L) 09/19/2017     Lab Results   Component Value Date     (H) 01/11/2023    BUN 15 01/11/2023    BUNCREA 15.3 01/11/2023    CREATSERUM 0.98 01/11/2023    ANIONGAP 4 01/11/2023    GFRNAA 63 12/30/2021    GFRAA 72 12/30/2021    CA 8.9 01/11/2023    OSMOCALC 290 01/11/2023    ALKPHO 86 09/08/2022    AST 26 09/08/2022    ALT 26 09/08/2022    BILT 0.4 09/08/2022    TP 7.1 09/23/2022    ALB 3.6 01/11/2023    GLOBULIN 3.8 09/08/2022     01/11/2023    K 4.5 01/11/2023     01/11/2023    CO2 29.0 01/11/2023     No results found for: PTP, PT, INR  No results found for: VITD, QVITD, [id number]

## 2023-08-07 NOTE — TELEPHONE ENCOUNTER
Patient was referred by ER to Neurology. Soonest I could get her in was 8/23 with Dr. Mariah Ornelas;  does that work or should she be double booked for a sooner   Appointment?

## 2023-08-07 NOTE — PATIENT INSTRUCTIONS
-My office will call once injection is approved  -Continue Amitryptyline at nighttime  -Continue Lyrica 150mg three times daily  -See neurology

## 2023-08-08 ENCOUNTER — PATIENT OUTREACH (OUTPATIENT)
Dept: CASE MANAGEMENT | Age: 68
End: 2023-08-08

## 2023-08-08 NOTE — TELEPHONE ENCOUNTER
Patient has been scheduled for  C7-T1 Interlaminar (Right sided) Epidural Steroid Injection under fluoroscopy guidance  on 08/21/2023 at the [facility] with .   -Anesthesia type: IVCS. -If scheduling [address] covid testing required for all procedures whether patient is vaccinated or not. -Patient informed not to eat or drink anything after midnight the night prior to the procedure, if being sedated. (For afternoon injections: Patient to fast 6-8 hours prior to procedure with IVCS/MAC. )  -Patient was advised that if he/she does receive the covid vaccine it needs to be at least 2 weeks before or after the injection. -Medications and allergies reviewed. -Patient reminded to hold NSAIDs (Ibuprofen, ASA 81, Aleve, Naproxen, Mobic, Diclofenac, Etodolac, Celebrex etc.) for 3 days prior to [facility]  if BMI is greater than 35. For Cervical injections only hold multivitamins, Vitamin E, Fish Oil, Phentermine (Lomaira) for 7 days prior to injection and NSAIDS.  mg to be held for 7 days prior to injections.  -If patient is receiving MAC/IVCS Phentermine Shanell Rock), Adlyxin (Lixisenatide), Bydureon BCise (Exenatide-release), Byetta (Exenatide), Mounjaro (Tirezepatide), Ozempic (Semaglutide), Rybelsus (oral demaglutide), Saxenda (Liraglutide), Trulicity (Dulaglutide), Victoriza (Liraglutide), Wegovy (Semaglutide), Berberine (oral natures ozempic) will need to be held for 7 days prior to injection.  -If on blood thinner clearance has been received to hold this medication by provider.   -Patient informed he/she will need a  to and from procedure. -[facility] is located in the [facility] 1st floor. Patient may park in the yellow or purple parking.     Patient verbalized understanding and agrees with plan.  -----> Scheduled in Epic: Yes

## 2023-08-09 NOTE — TELEPHONE ENCOUNTER
Called patient, no answer. Left message that referral has been completed and provided phone number to schedule.

## 2023-08-09 NOTE — PROGRESS NOTES
2nd attempt ED f/up apt request   NEURO -existing apt (8/23)    Fabiola Vizcaino APRN  [address] [address]  [phone]  Apt:  Aug 16 @11am     Confirmed w/ pt  Closing encounter

## 2023-08-16 ENCOUNTER — OFFICE VISIT (OUTPATIENT)
Dept: FAMILY MEDICINE CLINIC | Facility: CLINIC | Age: 68
End: 2023-08-16

## 2023-08-16 VITALS
WEIGHT: 208 LBS | TEMPERATURE: 98 F | HEART RATE: 58 BPM | HEIGHT: 65 IN | DIASTOLIC BLOOD PRESSURE: 70 MMHG | OXYGEN SATURATION: 98 % | BODY MASS INDEX: 34.66 KG/M2 | SYSTOLIC BLOOD PRESSURE: 126 MMHG

## 2023-08-16 DIAGNOSIS — M54.2 NECK PAIN: ICD-10-CM

## 2023-08-16 DIAGNOSIS — M48.02 FORAMINAL STENOSIS OF CERVICAL REGION: ICD-10-CM

## 2023-08-16 DIAGNOSIS — R07.9 CHEST PAIN, UNSPECIFIED TYPE: ICD-10-CM

## 2023-08-16 DIAGNOSIS — Z09 FOLLOW-UP EXAM: Primary | ICD-10-CM

## 2023-08-16 DIAGNOSIS — R51.9 NONINTRACTABLE HEADACHE, UNSPECIFIED CHRONICITY PATTERN, UNSPECIFIED HEADACHE TYPE: ICD-10-CM

## 2023-08-16 DIAGNOSIS — M54.12 CERVICAL RADICULOPATHY: ICD-10-CM

## 2023-08-16 DIAGNOSIS — I10 ESSENTIAL HYPERTENSION: ICD-10-CM

## 2023-08-16 LAB
ATRIAL RATE: 67 BPM
P AXIS: 66 DEGREES
P-R INTERVAL: 172 MS
Q-T INTERVAL: 432 MS
QRS DURATION: 72 MS
QTC CALCULATION (BEZET): 456 MS
R AXIS: -5 DEGREES
T AXIS: 31 DEGREES
VENTRICULAR RATE: 67 BPM

## 2023-08-16 PROCEDURE — 93000 ELECTROCARDIOGRAM COMPLETE: CPT

## 2023-08-16 PROCEDURE — 3074F SYST BP LT 130 MM HG: CPT

## 2023-08-16 PROCEDURE — 1125F AMNT PAIN NOTED PAIN PRSNT: CPT

## 2023-08-16 PROCEDURE — 99215 OFFICE O/P EST HI 40 MIN: CPT

## 2023-08-16 PROCEDURE — 1160F RVW MEDS BY RX/DR IN RCRD: CPT

## 2023-08-16 PROCEDURE — 1159F MED LIST DOCD IN RCRD: CPT

## 2023-08-16 PROCEDURE — 3008F BODY MASS INDEX DOCD: CPT

## 2023-08-16 PROCEDURE — 1170F FXNL STATUS ASSESSED: CPT

## 2023-08-16 PROCEDURE — 3078F DIAST BP <80 MM HG: CPT

## 2023-08-16 RX ORDER — DIAZEPAM 2 MG/1
2 TABLET ORAL 3 TIMES DAILY PRN
Qty: 12 TABLET | Refills: 0 | Status: CANCELLED | OUTPATIENT
Start: 2023-08-16

## 2023-08-16 RX ORDER — AMITRIPTYLINE HYDROCHLORIDE 25 MG/1
25 TABLET, FILM COATED ORAL NIGHTLY
Qty: 90 TABLET | Refills: 0 | Status: CANCELLED | OUTPATIENT
Start: 2023-08-16

## 2023-08-16 RX ORDER — HYDROCHLOROTHIAZIDE 12.5 MG/1
12.5 TABLET ORAL DAILY
Qty: 90 TABLET | Refills: 1 | Status: SHIPPED | OUTPATIENT
Start: 2023-08-16

## 2023-08-23 ENCOUNTER — OFFICE VISIT (OUTPATIENT)
Dept: NEUROLOGY | Facility: CLINIC | Age: 68
End: 2023-08-23
Payer: MEDICARE

## 2023-08-23 ENCOUNTER — TELEPHONE (OUTPATIENT)
Dept: PHYSICAL MEDICINE AND REHAB | Facility: CLINIC | Age: 68
End: 2023-08-23

## 2023-08-23 DIAGNOSIS — M54.81 OCCIPITAL NEURALGIA OF RIGHT SIDE: ICD-10-CM

## 2023-08-23 DIAGNOSIS — G44.321 INTRACTABLE CHRONIC POST-TRAUMATIC HEADACHE: Primary | ICD-10-CM

## 2023-08-23 PROCEDURE — 64405 NJX AA&/STRD GR OCPL NRV: CPT | Performed by: OTHER

## 2023-08-23 PROCEDURE — 1159F MED LIST DOCD IN RCRD: CPT | Performed by: OTHER

## 2023-08-23 PROCEDURE — 1160F RVW MEDS BY RX/DR IN RCRD: CPT | Performed by: OTHER

## 2023-08-23 PROCEDURE — 99214 OFFICE O/P EST MOD 30 MIN: CPT | Performed by: OTHER

## 2023-08-23 RX ORDER — LIDOCAINE HYDROCHLORIDE 10 MG/ML
2 INJECTION, SOLUTION INFILTRATION; PERINEURAL ONCE
Status: COMPLETED | OUTPATIENT
Start: 2023-08-23 | End: 2023-08-23

## 2023-08-23 RX ORDER — TRIAMCINOLONE ACETONIDE 40 MG/ML
40 INJECTION, SUSPENSION INTRA-ARTICULAR; INTRAMUSCULAR ONCE
Status: COMPLETED | OUTPATIENT
Start: 2023-08-23 | End: 2023-08-23

## 2023-08-23 RX ORDER — AMITRIPTYLINE HYDROCHLORIDE 25 MG/1
50 TABLET, FILM COATED ORAL NIGHTLY
Qty: 180 TABLET | Refills: 3 | Status: SHIPPED | OUTPATIENT
Start: 2023-08-23

## 2023-08-23 NOTE — PROCEDURES
Procedure Note: Bilateral Greater Occipital Nerve Block    Indications:  Severe bilateral occipital pain    Informed consent was obtained (explaining the procedure and risks and benefits of procedure) from patient:  the signed consent form was placed in the medical record. A time out was completed, verifying correct patient, procedure,site, positioning, and implants or special equipment. Patient's left occipital area was palpated to identify location of greater occipital nerve. Alcohol was applied topically to the skin. Using a 27 gauge needle (aspirating during insertion), 1 cc of a mixture of Kenalog, 2 cc of 1% lidocaine was injected on the left side (directing needle to center, left and right of painful focus). Pressure with a gauze pad was held briefly upon the site of puncture to minimize bleeding and to further spread anaesthetic subcutaneously. It was repeated on the right side. There were no complications. Patient was comfortable and left without complaint.

## 2023-08-23 NOTE — TELEPHONE ENCOUNTER
Initiated authorization for Occipital nerve block CPT/[facility] 16946, [id number] with  [employer]  Status: Approved-authorization is not required per health plan        Procedure done in office

## 2023-08-23 NOTE — PROGRESS NOTES
Neurology Initial Visit     Referred By: Dr. Piedra ref. provider found    Chief Complaint: Patient presents with:  New Patient: New patient rfd from ED after 8/4/23 visit. Reports R side headaches and neck pain after an accident where rocks tumbled down on her causing her to fall and hit her head with the concrete. States she lost consciousness for a few seconds and went to ED 6/30. She's nauseous since. Had CT-brain 5/10 and 7/1. Takes fioricet PRN. HPI:     Gerhardt Bad is a 76year old female, who presents for history of falling and hitting her head and then the other episode when something fell on her head while she was in the store. Some rocks stumbled on her neck and hand, she had CAT scan done back in May and then repeated again in July 2023 and those were not showing any acute changes. However she continued to have neck pain and headaches. She did see specialist about her neck pain and received epidural shot apparently. However she continued to have this shooting sharp pains through the back of the head especially in the right side, sometimes on the left side, shooting into the temple sometimes. Denies any focal neurological symptoms otherwise. She did feel uncomfortable with those shooting pains for about 15 minutes happening few times in the. Past Medical History:   Diagnosis Date    Back pain     Essential hypertension        No past surgical history on file. Social history:    Smoking status:   Never      Smokeless tobacco:   Never       Alcohol use   Never       Drug use:   Unknown       No family history on file. Current Outpatient Medications:     hydroCHLOROthiazide 12.5 MG Oral Tab, Take 1 tablet (12.5 mg total) by mouth daily. , Disp: 90 tablet, Rfl: 1    amitriptyline 25 MG Oral Tab, Take 1 tablet (25 mg total) by mouth nightly., Disp: 90 tablet, Rfl: 0    butalbital-acetaminophen-caffeine -40 MG Oral Tab, Take 1-2 tablets by mouth every 4 (four) hours as needed for Headaches., Disp: 20 tablet, Rfl: 0    methylPREDNISolone (MEDROL) 4 MG Oral Tablet Therapy Pack, As directed., Disp: 21 each, Rfl: 0    diazePAM 2 MG Oral Tab, Take 1 tablet (2 mg total) by mouth 3 (three) times daily as needed (muscle spasm). , Disp: 12 tablet, Rfl: 0    pregabalin (LYRICA) 150 MG Oral Cap, Take 1 capsule (150 mg total) by mouth 2 (two) times daily. , Disp: 60 capsule, Rfl: 0    levocetirizine 5 MG Oral Tab, Take 1 tablet (5 mg total) by mouth every evening., Disp: 30 tablet, Rfl: 1    fluticasone propionate 50 MCG/ACT Nasal Suspension, 2 sprays by Each Nare route daily. , Disp: 16 g, Rfl: 1    lidocaine 5 % External Ointment, Apply 1 Application topically as needed. , Disp: 240 g, Rfl: 0    acetaminophen 500 MG Oral Tab, Take 1 tablet (500 mg total) by mouth every 6 (six) hours as needed for Pain., Disp: , Rfl:     Misc. Devices [facility]) Does not apply Misc, Use as discussed. , Disp: 1 each, Rfl: 0    No Known Allergies    ROS:   As in HPI, the rest of the 14 system review was done and was negative      Physical Exam:  There were no vitals filed for this visit. General: No apparent distress, well nourished, well groomed. Head- Normocephalic, atraumatic  Eyes- No redness or swelling  ENT- Hearing intake, normal glutition  Neck- No masses or adenopathy  Cv: pulses were palpable and normal, no cyanosis or edema     Neurological:     Mental Status- Alert and oriented x3. Normal attention span and concentration  Thought process intact  Memory intact- recent and remote  Mood intact  Fund of knowledge appropriate for education and age    Language intact including: comprehension, naming, repetition, vocabulary    Cranial Nerves:  Tenderness to palpation over the both occipital especially on the right side. VII. Face symmetric, no facial weakness  VIII. Hearing intact to whisper. IX. Pallet elevates symmetrically. XI. Shoulder shrug is intact  XII.  Tongue is midline    Motor Exam:  Muscle tone normal  No atrophy or fasciculations  Strength- upper extremities 5/5 proximally and distally                    Coordination:  Finger to nose intact  Rapid alternating movements intact    Gait:  Normal posture  Normal physiologic      Labs:    Lab Results   Component Value Date    TSH 0.655 01/11/2023     Lab Results   Component Value Date    HDL 66 (H) 09/08/2022     (H) 09/08/2022    TRIG 101 09/08/2022     Lab Results   Component Value Date    HGB 12.1 01/11/2023    HCT 37.9 01/11/2023    MCV 97.4 01/11/2023    WBC 10.6 01/11/2023    .0 01/11/2023      Lab Results   Component Value Date    BUN 15 01/11/2023    CA 8.9 01/11/2023    ALT 26 09/08/2022    AST 26 09/08/2022    ALB 3.6 01/11/2023     01/11/2023    K 4.5 01/11/2023     01/11/2023    CO2 29.0 01/11/2023      I have reviewed labs. Imaging Studies:  I have independently reviewed imaging. CT of the head was intimately reviewed, no obvious acute changes. Patient seems to be describing some combination of posttraumatic headache, possible occipital neuralgia. Assessment   1. Intractable chronic post-traumatic headache  We will try occipital nerve block on both sides as well as we will increase the dose of Elavil. 2. Occipital neuralgia of right side       Education and counseling provided to patient. Instructed patient to call my office or seek medical attention immediately if symptoms worsen. Patient verbalized understanding of information given. All questions were answered. All side effects of drugs were discussed. Return to clinic in: No follow-ups on file.     Wojciech Beatty MD

## 2023-09-03 NOTE — LETTER
AUTHORIZATION FOR SURGICAL OPERATION OR OTHER PROCEDURE    1. I hereby authorize Dr. Bueno and the [facility] staff assigned to my case to perform the following operation and/or procedure at the [facility]:    _______________________________________________________________________________________________      Nerve block  _______________________________________________________________________________________________    2.  My physician has explained the nature and purpose of the operation or other procedure, possible alternative methods of treatment, the risks involved, and the possibility of complication to me.  I acknowledge that no guarantee has been made as to the result that may be obtained.  3.  I recognize that, during the course of this operation, or other procedure, unforseen conditions may necessitate additional or different procedure than those listed above.  I, therefore, further authorize and request that the above named physician, his/her physician assistants or designees perform such procedures as are, in his/her professional opinion, necessary and desirable.  4.  Any tissue or organs removed in the operation or other procedure may be disposed of by and at the discretion of the [facility] staff and [facility].  5.  I understand that in the event of a medical emergency, I will be transported by local paramedics to [facility] or other hospital emergency department.  6.  I certify that I have read and fully understand the above consent to operation and/or other procedure.    7.  I acknowledge that my physician has explained sedation/analgesia administration to me including the risks and benefits.  I consent to the administration of sedation/analgesia as may be necessary or desirable in the judgement of my physician.    Witness signature: ___________________________________________________ Date:  ______/______/_____                    Time:  ________ A.M.  P.M.        Pegmohan Garcia  [id number]  1/15/1955         Patient signature:  ___________________________________________________               Statement of Physician  My signature below affirms that prior to the time of the procedure, I have explained to the patient and/or his/her guardian, the risks and benefits involved in the proposed treatment and any reasonable alternative to the proposed treatment.  I have also explained the risks and benefits involved in the refusal of the proposed treatment and have answered the patient's questions.                        Date:  ______/______/_______  Provider                      Signature:  __________________________________________________________       Time:  ___________ ASTEVEN PELAYO        Home

## 2023-09-14 ENCOUNTER — OFFICE VISIT (OUTPATIENT)
Dept: FAMILY MEDICINE CLINIC | Facility: CLINIC | Age: 68
End: 2023-09-14

## 2023-09-14 ENCOUNTER — LAB ENCOUNTER (OUTPATIENT)
Dept: LAB | Age: 68
End: 2023-09-14
Attending: FAMILY MEDICINE
Payer: COMMERCIAL

## 2023-09-14 VITALS
DIASTOLIC BLOOD PRESSURE: 82 MMHG | HEIGHT: 65 IN | HEART RATE: 79 BPM | WEIGHT: 208 LBS | SYSTOLIC BLOOD PRESSURE: 128 MMHG | BODY MASS INDEX: 34.66 KG/M2 | TEMPERATURE: 98 F

## 2023-09-14 DIAGNOSIS — N18.31 STAGE 3A CHRONIC KIDNEY DISEASE (HCC): ICD-10-CM

## 2023-09-14 DIAGNOSIS — I10 ESSENTIAL HYPERTENSION: ICD-10-CM

## 2023-09-14 DIAGNOSIS — H69.91 EUSTACHIAN TUBE DYSFUNCTION, RIGHT: ICD-10-CM

## 2023-09-14 DIAGNOSIS — J30.9 ALLERGIC RHINITIS, UNSPECIFIED SEASONALITY, UNSPECIFIED TRIGGER: ICD-10-CM

## 2023-09-14 DIAGNOSIS — Z00.00 MEDICARE ANNUAL WELLNESS VISIT, INITIAL: ICD-10-CM

## 2023-09-14 DIAGNOSIS — M48.02 FORAMINAL STENOSIS OF CERVICAL REGION: ICD-10-CM

## 2023-09-14 DIAGNOSIS — M48.02 CERVICAL STENOSIS OF SPINAL CANAL: ICD-10-CM

## 2023-09-14 DIAGNOSIS — Z12.31 ENCOUNTER FOR SCREENING MAMMOGRAM FOR BREAST CANCER: Primary | ICD-10-CM

## 2023-09-14 LAB
ALBUMIN SERPL-MCNC: 3.3 G/DL (ref 3.4–5)
ALBUMIN/GLOB SERPL: 0.9 {RATIO} (ref 1–2)
ALP LIVER SERPL-CCNC: 95 U/L
ALT SERPL-CCNC: 62 U/L
ANION GAP SERPL CALC-SCNC: 5 MMOL/L (ref 0–18)
AST SERPL-CCNC: 27 U/L (ref 15–37)
BASOPHILS # BLD AUTO: 0.01 X10(3) UL (ref 0–0.2)
BASOPHILS NFR BLD AUTO: 0.2 %
BILIRUB SERPL-MCNC: 0.3 MG/DL (ref 0.1–2)
BUN BLD-MCNC: 9 MG/DL (ref 7–18)
BUN/CREAT SERPL: 7.8 (ref 10–20)
CALCIUM BLD-MCNC: 8.6 MG/DL (ref 8.5–10.1)
CHLORIDE SERPL-SCNC: 110 MMOL/L (ref 98–112)
CHOLEST SERPL-MCNC: 188 MG/DL (ref ?–200)
CO2 SERPL-SCNC: 28 MMOL/L (ref 21–32)
CREAT BLD-MCNC: 1.16 MG/DL
DEPRECATED RDW RBC AUTO: 47.8 FL (ref 35.1–46.3)
EGFRCR SERPLBLD CKD-EPI 2021: 51 ML/MIN/1.73M2 (ref 60–?)
EOSINOPHIL # BLD AUTO: 0.03 X10(3) UL (ref 0–0.7)
EOSINOPHIL NFR BLD AUTO: 0.7 %
ERYTHROCYTE [DISTWIDTH] IN BLOOD BY AUTOMATED COUNT: 13 % (ref 11–15)
FASTING PATIENT LIPID ANSWER: YES
FASTING STATUS PATIENT QL REPORTED: YES
GLOBULIN PLAS-MCNC: 3.7 G/DL (ref 2.8–4.4)
GLUCOSE BLD-MCNC: 78 MG/DL (ref 70–99)
HCT VFR BLD AUTO: 39.3 %
HDLC SERPL-MCNC: 70 MG/DL (ref 40–59)
HGB BLD-MCNC: 12.6 G/DL
IMM GRANULOCYTES # BLD AUTO: 0.01 X10(3) UL (ref 0–1)
IMM GRANULOCYTES NFR BLD: 0.2 %
LDLC SERPL CALC-MCNC: 103 MG/DL (ref ?–100)
LYMPHOCYTES # BLD AUTO: 1.91 X10(3) UL (ref 1–4)
LYMPHOCYTES NFR BLD AUTO: 42.8 %
MCH RBC QN AUTO: 31.8 PG (ref 26–34)
MCHC RBC AUTO-ENTMCNC: 32.1 G/DL (ref 31–37)
MCV RBC AUTO: 99.2 FL
MONOCYTES # BLD AUTO: 0.38 X10(3) UL (ref 0.1–1)
MONOCYTES NFR BLD AUTO: 8.5 %
NEUTROPHILS # BLD AUTO: 2.12 X10 (3) UL (ref 1.5–7.7)
NEUTROPHILS # BLD AUTO: 2.12 X10(3) UL (ref 1.5–7.7)
NEUTROPHILS NFR BLD AUTO: 47.6 %
NONHDLC SERPL-MCNC: 118 MG/DL (ref ?–130)
OSMOLALITY SERPL CALC.SUM OF ELEC: 294 MOSM/KG (ref 275–295)
PHOSPHATE SERPL-MCNC: 2.8 MG/DL (ref 2.5–4.9)
PLATELET # BLD AUTO: 243 10(3)UL (ref 150–450)
POTASSIUM SERPL-SCNC: 4.3 MMOL/L (ref 3.5–5.1)
PROT SERPL-MCNC: 7 G/DL (ref 6.4–8.2)
RBC # BLD AUTO: 3.96 X10(6)UL
SODIUM SERPL-SCNC: 143 MMOL/L (ref 136–145)
TRIGL SERPL-MCNC: 84 MG/DL (ref 30–149)
TSI SER-ACNC: 1.44 MIU/ML (ref 0.36–3.74)
VLDLC SERPL CALC-MCNC: 14 MG/DL (ref 0–30)
WBC # BLD AUTO: 4.5 X10(3) UL (ref 4–11)

## 2023-09-14 PROCEDURE — 96160 PT-FOCUSED HLTH RISK ASSMT: CPT | Performed by: FAMILY MEDICINE

## 2023-09-14 PROCEDURE — 1159F MED LIST DOCD IN RCRD: CPT | Performed by: FAMILY MEDICINE

## 2023-09-14 PROCEDURE — 84100 ASSAY OF PHOSPHORUS: CPT

## 2023-09-14 PROCEDURE — 3074F SYST BP LT 130 MM HG: CPT | Performed by: FAMILY MEDICINE

## 2023-09-14 PROCEDURE — 36415 COLL VENOUS BLD VENIPUNCTURE: CPT

## 2023-09-14 PROCEDURE — 80061 LIPID PANEL: CPT

## 2023-09-14 PROCEDURE — 99213 OFFICE O/P EST LOW 20 MIN: CPT | Performed by: FAMILY MEDICINE

## 2023-09-14 PROCEDURE — 3079F DIAST BP 80-89 MM HG: CPT | Performed by: FAMILY MEDICINE

## 2023-09-14 PROCEDURE — 1126F AMNT PAIN NOTED NONE PRSNT: CPT | Performed by: FAMILY MEDICINE

## 2023-09-14 PROCEDURE — 1170F FXNL STATUS ASSESSED: CPT | Performed by: FAMILY MEDICINE

## 2023-09-14 PROCEDURE — 3008F BODY MASS INDEX DOCD: CPT | Performed by: FAMILY MEDICINE

## 2023-09-14 PROCEDURE — G0439 PPPS, SUBSEQ VISIT: HCPCS | Performed by: FAMILY MEDICINE

## 2023-09-14 PROCEDURE — 80053 COMPREHEN METABOLIC PANEL: CPT

## 2023-09-14 PROCEDURE — 84443 ASSAY THYROID STIM HORMONE: CPT

## 2023-09-14 PROCEDURE — 85025 COMPLETE CBC W/AUTO DIFF WBC: CPT

## 2023-09-14 RX ORDER — FLUTICASONE PROPIONATE 50 MCG
2 SPRAY, SUSPENSION (ML) NASAL DAILY
Qty: 16 G | Refills: 1 | Status: SHIPPED | OUTPATIENT
Start: 2023-09-14 | End: 2024-09-08

## 2023-09-14 RX ORDER — AMITRIPTYLINE HYDROCHLORIDE 25 MG/1
50 TABLET, FILM COATED ORAL NIGHTLY
Qty: 180 TABLET | Refills: 3 | Status: SHIPPED | OUTPATIENT
Start: 2023-09-14

## 2023-09-14 RX ORDER — PREGABALIN 150 MG/1
150 CAPSULE ORAL 2 TIMES DAILY
Qty: 60 CAPSULE | Refills: 0 | Status: SHIPPED | OUTPATIENT
Start: 2023-09-14

## 2023-09-14 RX ORDER — DIAZEPAM 2 MG/1
2 TABLET ORAL 3 TIMES DAILY PRN
Qty: 12 TABLET | Refills: 0 | Status: SHIPPED | OUTPATIENT
Start: 2023-09-14

## 2023-09-14 RX ORDER — HYDROCHLOROTHIAZIDE 12.5 MG/1
12.5 TABLET ORAL DAILY
Qty: 90 TABLET | Refills: 1 | Status: SHIPPED | OUTPATIENT
Start: 2023-09-14

## 2023-09-14 RX ORDER — LEVOCETIRIZINE DIHYDROCHLORIDE 5 MG/1
5 TABLET, FILM COATED ORAL EVERY EVENING
Qty: 30 TABLET | Refills: 1 | Status: SHIPPED | OUTPATIENT
Start: 2023-09-14

## 2023-09-29 ENCOUNTER — OFFICE VISIT (OUTPATIENT)
Dept: OTOLARYNGOLOGY | Facility: CLINIC | Age: 68
End: 2023-09-29

## 2023-09-29 VITALS — WEIGHT: 208 LBS | BODY MASS INDEX: 34.66 KG/M2 | HEIGHT: 65 IN

## 2023-09-29 DIAGNOSIS — H93.11 RIGHT-SIDED TINNITUS: Primary | ICD-10-CM

## 2023-09-29 DIAGNOSIS — H91.8X3 ASYMMETRICAL HEARING LOSS: ICD-10-CM

## 2023-09-29 DIAGNOSIS — M54.2 CERVICALGIA: ICD-10-CM

## 2023-09-30 NOTE — PATIENT INSTRUCTIONS
An audiogram was ordered to better evaluate your asymmetric hearing loss and tinnitus in your right ear. I will of course notify you of all results. No evidence of abnormalities in your neck other than the recent trauma to explain the pain.

## 2023-10-31 ENCOUNTER — HOSPITAL ENCOUNTER (EMERGENCY)
Facility: HOSPITAL | Age: 68
Discharge: HOME | End: 2023-11-01
Attending: EMERGENCY MEDICINE
Payer: COMMERCIAL

## 2023-10-31 DIAGNOSIS — R51.9 ACUTE NONINTRACTABLE HEADACHE, UNSPECIFIED HEADACHE TYPE: Primary | ICD-10-CM

## 2023-10-31 LAB — GLUCOSE BLDC GLUCOMTR-MCNC: 96 MG/DL (ref 70–99)

## 2023-10-31 PROCEDURE — 99285 EMERGENCY DEPT VISIT HI MDM: CPT

## 2023-10-31 PROCEDURE — 96361 HYDRATE IV INFUSION ADD-ON: CPT

## 2023-10-31 PROCEDURE — 96374 THER/PROPH/DIAG INJ IV PUSH: CPT

## 2023-10-31 PROCEDURE — 82962 GLUCOSE BLOOD TEST: CPT

## 2023-10-31 PROCEDURE — 96375 TX/PRO/DX INJ NEW DRUG ADDON: CPT

## 2023-10-31 PROCEDURE — 99284 EMERGENCY DEPT VISIT MOD MDM: CPT

## 2023-10-31 PROCEDURE — 85025 COMPLETE CBC W/AUTO DIFF WBC: CPT | Performed by: EMERGENCY MEDICINE

## 2023-10-31 PROCEDURE — 80048 BASIC METABOLIC PNL TOTAL CA: CPT | Performed by: EMERGENCY MEDICINE

## 2023-10-31 RX ORDER — KETOROLAC TROMETHAMINE 15 MG/ML
30 INJECTION, SOLUTION INTRAMUSCULAR; INTRAVENOUS ONCE
Status: DISCONTINUED | OUTPATIENT
Start: 2023-10-31 | End: 2023-10-31

## 2023-10-31 RX ORDER — METOCLOPRAMIDE HYDROCHLORIDE 5 MG/ML
10 INJECTION INTRAMUSCULAR; INTRAVENOUS ONCE
Status: COMPLETED | OUTPATIENT
Start: 2023-10-31 | End: 2023-10-31

## 2023-10-31 RX ORDER — ACETAMINOPHEN 500 MG
1000 TABLET ORAL ONCE
Status: COMPLETED | OUTPATIENT
Start: 2023-10-31 | End: 2023-10-31

## 2023-10-31 RX ORDER — DIPHENHYDRAMINE HYDROCHLORIDE 50 MG/ML
25 INJECTION INTRAMUSCULAR; INTRAVENOUS ONCE
Status: COMPLETED | OUTPATIENT
Start: 2023-10-31 | End: 2023-10-31

## 2023-11-01 ENCOUNTER — APPOINTMENT (OUTPATIENT)
Dept: CT IMAGING | Facility: HOSPITAL | Age: 68
End: 2023-11-01
Attending: EMERGENCY MEDICINE
Payer: COMMERCIAL

## 2023-11-01 VITALS
WEIGHT: 192 LBS | HEIGHT: 69 IN | RESPIRATION RATE: 16 BRPM | TEMPERATURE: 98 F | SYSTOLIC BLOOD PRESSURE: 157 MMHG | DIASTOLIC BLOOD PRESSURE: 76 MMHG | HEART RATE: 74 BPM | OXYGEN SATURATION: 99 % | BODY MASS INDEX: 28.44 KG/M2

## 2023-11-01 LAB
ANION GAP SERPL CALC-SCNC: 6 MMOL/L (ref 0–18)
BASOPHILS # BLD AUTO: 0.02 X10(3) UL (ref 0–0.2)
BASOPHILS NFR BLD AUTO: 0.3 %
BUN BLD-MCNC: 9 MG/DL (ref 9–23)
BUN/CREAT SERPL: 8.9 (ref 10–20)
CALCIUM BLD-MCNC: 9.6 MG/DL (ref 8.7–10.4)
CHLORIDE SERPL-SCNC: 103 MMOL/L (ref 98–112)
CO2 SERPL-SCNC: 28 MMOL/L (ref 21–32)
CREAT BLD-MCNC: 1.01 MG/DL
DEPRECATED RDW RBC AUTO: 45.1 FL (ref 35.1–46.3)
EGFRCR SERPLBLD CKD-EPI 2021: 61 ML/MIN/1.73M2 (ref 60–?)
EOSINOPHIL # BLD AUTO: 0.1 X10(3) UL (ref 0–0.7)
EOSINOPHIL NFR BLD AUTO: 1.6 %
ERYTHROCYTE [DISTWIDTH] IN BLOOD BY AUTOMATED COUNT: 13.1 % (ref 11–15)
GLUCOSE BLD-MCNC: 84 MG/DL (ref 70–99)
HCT VFR BLD AUTO: 36.9 %
HGB BLD-MCNC: 12.7 G/DL
IMM GRANULOCYTES # BLD AUTO: 0.01 X10(3) UL (ref 0–1)
IMM GRANULOCYTES NFR BLD: 0.2 %
LYMPHOCYTES # BLD AUTO: 2.32 X10(3) UL (ref 1–4)
LYMPHOCYTES NFR BLD AUTO: 37.4 %
MCH RBC QN AUTO: 32.6 PG (ref 26–34)
MCHC RBC AUTO-ENTMCNC: 34.4 G/DL (ref 31–37)
MCV RBC AUTO: 94.6 FL
MONOCYTES # BLD AUTO: 0.59 X10(3) UL (ref 0.1–1)
MONOCYTES NFR BLD AUTO: 9.5 %
NEUTROPHILS # BLD AUTO: 3.16 X10 (3) UL (ref 1.5–7.7)
NEUTROPHILS # BLD AUTO: 3.16 X10(3) UL (ref 1.5–7.7)
NEUTROPHILS NFR BLD AUTO: 51 %
OSMOLALITY SERPL CALC.SUM OF ELEC: 282 MOSM/KG (ref 275–295)
PLATELET # BLD AUTO: 240 10(3)UL (ref 150–450)
POTASSIUM SERPL-SCNC: 3.3 MMOL/L (ref 3.5–5.1)
RBC # BLD AUTO: 3.9 X10(6)UL
SODIUM SERPL-SCNC: 137 MMOL/L (ref 136–145)
WBC # BLD AUTO: 6.2 X10(3) UL (ref 4–11)

## 2023-11-01 PROCEDURE — 70450 CT HEAD/BRAIN W/O DYE: CPT | Performed by: EMERGENCY MEDICINE

## 2023-11-01 PROCEDURE — 96361 HYDRATE IV INFUSION ADD-ON: CPT

## 2023-11-07 ENCOUNTER — PATIENT OUTREACH (OUTPATIENT)
Dept: CASE MANAGEMENT | Age: 68
End: 2023-11-07

## 2023-11-07 ENCOUNTER — TELEPHONE (OUTPATIENT)
Dept: NEUROLOGY | Facility: CLINIC | Age: 68
End: 2023-11-07

## 2023-11-07 ENCOUNTER — TELEPHONE (OUTPATIENT)
Dept: PHYSICAL MEDICINE AND REHAB | Facility: CLINIC | Age: 68
End: 2023-11-07

## 2023-11-07 DIAGNOSIS — G44.321 INTRACTABLE CHRONIC POST-TRAUMATIC HEADACHE: ICD-10-CM

## 2023-11-07 DIAGNOSIS — M54.81 OCCIPITAL NEURALGIA OF RIGHT SIDE: Primary | ICD-10-CM

## 2023-11-07 NOTE — TELEPHONE ENCOUNTER
Initiated authorization for Occipital nerve block bilateral  procedure. Dx code M54.81 with [facility] Insurance Plan CPT Code 75043-19   Status No Auth required

## 2023-11-07 NOTE — PROGRESS NOTES
1st attempt ER f/up apt request    Grazyna Nieves NP  PCP  [address] [facility] [address] [phone]  Apt: Nov 13 @11:30am     Confirmed w/ pt   Closing encounter

## 2023-11-07 NOTE — TELEPHONE ENCOUNTER
Patient called to see if she could get in sooner that 12/5; her current appointment. In talking to her she was under the impression she was coming in for a nerve block injection. I told her we will have Doctor do an Order, get it authorized and try to get her in sooner. She said she has been in a lot of pain and the injection helped her a lot the last time she had it done.   Please expedite if possible

## 2023-11-13 ENCOUNTER — OFFICE VISIT (OUTPATIENT)
Dept: FAMILY MEDICINE CLINIC | Facility: CLINIC | Age: 68
End: 2023-11-13

## 2023-11-13 VITALS
HEART RATE: 74 BPM | HEIGHT: 65 IN | DIASTOLIC BLOOD PRESSURE: 80 MMHG | BODY MASS INDEX: 35.65 KG/M2 | WEIGHT: 214 LBS | SYSTOLIC BLOOD PRESSURE: 138 MMHG | TEMPERATURE: 97 F | OXYGEN SATURATION: 92 %

## 2023-11-13 DIAGNOSIS — Z23 NEED FOR VACCINATION: ICD-10-CM

## 2023-11-13 DIAGNOSIS — I10 ESSENTIAL HYPERTENSION: ICD-10-CM

## 2023-11-13 DIAGNOSIS — M48.02 CERVICAL STENOSIS OF SPINAL CANAL: ICD-10-CM

## 2023-11-13 DIAGNOSIS — G44.321 INTRACTABLE CHRONIC POST-TRAUMATIC HEADACHE: ICD-10-CM

## 2023-11-13 DIAGNOSIS — Z09 FOLLOW-UP EXAM: Primary | ICD-10-CM

## 2023-11-13 PROBLEM — E66.01 SEVERE OBESITY (BMI 35.0-39.9) WITH COMORBIDITY (HCC): Chronic | Status: ACTIVE | Noted: 2023-11-13

## 2023-11-13 PROCEDURE — 3075F SYST BP GE 130 - 139MM HG: CPT

## 2023-11-13 PROCEDURE — 90662 IIV NO PRSV INCREASED AG IM: CPT

## 2023-11-13 PROCEDURE — 3008F BODY MASS INDEX DOCD: CPT

## 2023-11-13 PROCEDURE — 1125F AMNT PAIN NOTED PAIN PRSNT: CPT

## 2023-11-13 PROCEDURE — 3079F DIAST BP 80-89 MM HG: CPT

## 2023-11-13 PROCEDURE — 1159F MED LIST DOCD IN RCRD: CPT

## 2023-11-13 PROCEDURE — 99214 OFFICE O/P EST MOD 30 MIN: CPT

## 2023-11-13 PROCEDURE — 90471 IMMUNIZATION ADMIN: CPT

## 2023-11-13 PROCEDURE — 1160F RVW MEDS BY RX/DR IN RCRD: CPT

## 2023-11-13 RX ORDER — DIAZEPAM 2 MG/1
2 TABLET ORAL 3 TIMES DAILY PRN
Qty: 12 TABLET | Refills: 0 | Status: CANCELLED | OUTPATIENT
Start: 2023-11-13

## 2023-11-13 RX ORDER — HYDROCHLOROTHIAZIDE 12.5 MG/1
12.5 TABLET ORAL DAILY
Qty: 90 TABLET | Refills: 1 | Status: SHIPPED | OUTPATIENT
Start: 2023-11-13

## 2023-11-13 RX ORDER — HYDROCHLOROTHIAZIDE 12.5 MG/1
12.5 TABLET ORAL DAILY
Qty: 90 TABLET | Refills: 1 | Status: CANCELLED | OUTPATIENT
Start: 2023-11-13

## 2023-11-13 RX ORDER — AMITRIPTYLINE HYDROCHLORIDE 25 MG/1
50 TABLET, FILM COATED ORAL NIGHTLY
Qty: 180 TABLET | Refills: 3 | Status: CANCELLED | OUTPATIENT
Start: 2023-11-13

## 2023-11-13 RX ORDER — PREGABALIN 150 MG/1
150 CAPSULE ORAL 2 TIMES DAILY
Qty: 60 CAPSULE | Refills: 0 | Status: CANCELLED | OUTPATIENT
Start: 2023-11-13

## 2023-11-13 RX ORDER — TIZANIDINE HYDROCHLORIDE 4 MG/1
CAPSULE, GELATIN COATED ORAL
COMMUNITY
Start: 2023-11-03

## 2023-12-05 ENCOUNTER — OFFICE VISIT (OUTPATIENT)
Dept: NEUROLOGY | Facility: CLINIC | Age: 68
End: 2023-12-05
Payer: MEDICARE

## 2023-12-05 DIAGNOSIS — M54.81 OCCIPITAL NEURALGIA OF RIGHT SIDE: Primary | ICD-10-CM

## 2023-12-05 DIAGNOSIS — G44.321 INTRACTABLE CHRONIC POST-TRAUMATIC HEADACHE: ICD-10-CM

## 2023-12-05 PROCEDURE — 64405 NJX AA&/STRD GR OCPL NRV: CPT | Performed by: OTHER

## 2023-12-05 PROCEDURE — 1159F MED LIST DOCD IN RCRD: CPT | Performed by: OTHER

## 2023-12-05 PROCEDURE — 1160F RVW MEDS BY RX/DR IN RCRD: CPT | Performed by: OTHER

## 2023-12-05 RX ORDER — TRIAMCINOLONE ACETONIDE 40 MG/ML
80 INJECTION, SUSPENSION INTRA-ARTICULAR; INTRAMUSCULAR ONCE
Status: COMPLETED | OUTPATIENT
Start: 2023-12-05 | End: 2023-12-05

## 2023-12-05 RX ORDER — LIDOCAINE HYDROCHLORIDE 10 MG/ML
4 INJECTION, SOLUTION INFILTRATION; PERINEURAL ONCE
Status: COMPLETED | OUTPATIENT
Start: 2023-12-05 | End: 2023-12-05

## 2024-02-05 ENCOUNTER — OFFICE VISIT (OUTPATIENT)
Dept: FAMILY MEDICINE CLINIC | Facility: CLINIC | Age: 69
End: 2024-02-05

## 2024-02-05 VITALS
DIASTOLIC BLOOD PRESSURE: 80 MMHG | BODY MASS INDEX: 37 KG/M2 | WEIGHT: 222 LBS | SYSTOLIC BLOOD PRESSURE: 148 MMHG | RESPIRATION RATE: 18 BRPM | HEART RATE: 73 BPM

## 2024-02-05 DIAGNOSIS — M54.2 NECK PAIN ON RIGHT SIDE: Primary | ICD-10-CM

## 2024-02-05 DIAGNOSIS — Z12.31 ENCOUNTER FOR SCREENING MAMMOGRAM FOR BREAST CANCER: ICD-10-CM

## 2024-02-05 DIAGNOSIS — M51.26 HNP (HERNIATED NUCLEUS PULPOSUS), LUMBAR: ICD-10-CM

## 2024-02-05 DIAGNOSIS — M54.81 OCCIPITAL NEURALGIA OF RIGHT SIDE: ICD-10-CM

## 2024-02-05 DIAGNOSIS — M48.061 SPINAL STENOSIS AT L4-L5 LEVEL: ICD-10-CM

## 2024-02-05 DIAGNOSIS — M48.02 CERVICAL STENOSIS OF SPINAL CANAL: ICD-10-CM

## 2024-02-05 PROCEDURE — 1125F AMNT PAIN NOTED PAIN PRSNT: CPT | Performed by: FAMILY MEDICINE

## 2024-02-05 PROCEDURE — 3079F DIAST BP 80-89 MM HG: CPT | Performed by: FAMILY MEDICINE

## 2024-02-05 PROCEDURE — 3077F SYST BP >= 140 MM HG: CPT | Performed by: FAMILY MEDICINE

## 2024-02-05 PROCEDURE — 99214 OFFICE O/P EST MOD 30 MIN: CPT | Performed by: FAMILY MEDICINE

## 2024-02-05 RX ORDER — METHYLPREDNISOLONE 4 MG/1
TABLET ORAL
Qty: 1 EACH | Refills: 0 | Status: SHIPPED | OUTPATIENT
Start: 2024-02-05

## 2024-02-05 RX ORDER — NORTRIPTYLINE HYDROCHLORIDE 25 MG/1
CAPSULE ORAL
COMMUNITY
Start: 2023-11-17

## 2024-02-06 NOTE — PATIENT INSTRUCTIONS
Continue with pain management. Keep physiatry appointment.   Medrol Dosepak has been prescribed.  Patient advised to hold on tenacity as a muscle relaxant since it is not working.  Pregabalin and amitriptyline to be taken as needed for discomfort.  Follow up with neurology for occipital injection.

## 2024-02-08 NOTE — PROGRESS NOTES
Subjective:     Patient ID: Peg Garcia is a 69 year old female.    This is a 69-year-old -American female is doing a follow-up regarding the continuation of posterolateral right neck discomfort which is presumed to be a continued result of trauma from the fall she took in the midportion of the calendar year 2023.  The patient does see neurology for occipital injections for the same region of pain.  The patient is due to follow-up to have the next injection done.    In the meantime the patient continues to have discomfort which is sleep disturbing and even amitriptyline, tizanidine and Tylenol have not been able to give the patient any degree of relief for any significant length of time.    Patient is here for possible pain management alternative in hopes that it can carry her over until she sees the neurologist for her injection.        History/Other:   Review of Systems  Current Outpatient Medications   Medication Sig Dispense Refill    nortriptyline 25 MG Oral Cap TAKE 1 CAPSULE BY MOUTH EVERY NIGHT AT BEDTIME. CAN INCREASE TO 2 CAPSULE BY MOUTH EVERY NIGHT AT BEDTIME AS NEEDED. DISCONTINUE AMITRIPTYLINE.      methylPREDNISolone (MEDROL) 4 MG Oral Tablet Therapy Pack As directed. 1 each 0    hydroCHLOROthiazide 12.5 MG Oral Tab Take 1 tablet (12.5 mg total) by mouth daily. 90 tablet 1    amitriptyline 25 MG Oral Tab Take 2 tablets (50 mg total) by mouth nightly. 180 tablet 3    diazePAM 2 MG Oral Tab Take 1 tablet (2 mg total) by mouth 3 (three) times daily as needed (muscle spasm). 12 tablet 0    pregabalin (LYRICA) 150 MG Oral Cap Take 1 capsule (150 mg total) by mouth 2 (two) times daily. 60 capsule 0    fluticasone propionate 50 MCG/ACT Nasal Suspension 2 sprays by Each Nare route daily. 16 g 1    levocetirizine 5 MG Oral Tab Take 1 tablet (5 mg total) by mouth every evening. 30 tablet 1    methylPREDNISolone (MEDROL) 4 MG Oral Tablet Therapy Pack As directed. 21 each 0    lidocaine 5 % External  Ointment Apply 1 Application topically as needed. 240 g 0    acetaminophen 500 MG Oral Tab Take 1 tablet (500 mg total) by mouth every 6 (six) hours as needed for Pain.      tiZANidine HCl 4 MG Oral Cap       Misc. Devices (WALKER) Does not apply Misc Use as discussed. 1 each 0     Allergies:No Known Allergies    Past Medical History:   Diagnosis Date    Back pain     Essential hypertension       No past surgical history on file.   No family history on file.   Social History:   Social History     Socioeconomic History    Marital status:    Tobacco Use    Smoking status: Never    Smokeless tobacco: Never   Vaping Use    Vaping Use: Never used   Substance and Sexual Activity    Alcohol use: Never    Drug use: Never   Other Topics Concern    Caffeine Concern Yes     Comment: coffee and tea    Exercise Yes     Comment: PT   Social History Narrative    The patient uses the following assistive device(s):  single-point cane.      The patient does live in a home with stairs.        Objective:   Vitals:    02/05/24 1804   BP: 148/80   Pulse: 73   Resp: 18       Physical Exam  Constitutional:       General: She is in acute distress.      Appearance: She is not ill-appearing.   HENT:      Head:        Comments: Regions of discomfort as depicted.  Cardiovascular:      Rate and Rhythm: Normal rate and regular rhythm.      Heart sounds: Murmur heard.      No gallop.   Pulmonary:      Effort: Pulmonary effort is normal. No respiratory distress.      Breath sounds: Normal breath sounds.   Musculoskeletal:      Cervical back: Rigidity, spasms and tenderness present. Pain with movement present. Decreased range of motion.        Back:    Neurological:      Mental Status: She is alert.         Assessment & Plan:   1. Neck pain on right side  The following medication has been prescribed.  - methylPREDNISolone (MEDROL) 4 MG Oral Tablet Therapy Pack; As directed.  Dispense: 1 each; Refill: 0    2. Cervical stenosis of spinal  canal  See #1.  - methylPREDNISolone (MEDROL) 4 MG Oral Tablet Therapy Pack; As directed.  Dispense: 1 each; Refill: 0    3. Spinal stenosis at L4-L5 level  Pain management.  - methylPREDNISolone (MEDROL) 4 MG Oral Tablet Therapy Pack; As directed.  Dispense: 1 each; Refill: 0    4. HNP (herniated nucleus pulposus), lumbar  Pain management.  - methylPREDNISolone (MEDROL) 4 MG Oral Tablet Therapy Pack; As directed.  Dispense: 1 each; Refill: 0    5. Encounter for screening mammogram for breast cancer  Ordered.  - [facility] MARYCARMEN 2D+3D SCREENING BILAT (CPT=77067/00553); Future    6. Occipital neuralgia of right side  Keep neurology appointments.  - Neuro Referral - In Network      No orders of the defined types were placed in this encounter.      Meds This Visit:  Requested Prescriptions     Signed Prescriptions Disp Refills    methylPREDNISolone (MEDROL) 4 MG Oral Tablet Therapy Pack 1 each 0     Sig: As directed.       Imaging & Referrals:  NEURO - INTERNAL  [facility] MARYCARMEN 2D+3D SCREENING BILAT (CPT=77067/78398)     Patient Instructions   Continue with pain management. Keep physiatry appointment.   Medrol Dosepak has been prescribed.  Patient advised to hold on tenacity as a muscle relaxant since it is not working.  Pregabalin and amitriptyline to be taken as needed for discomfort.  Follow up with neurology for occipital injection.    Return in about 3 months (around 5/5/2024), or if symptoms worsen or fail to improve.

## 2024-03-05 ENCOUNTER — TELEPHONE (OUTPATIENT)
Dept: PHYSICAL MEDICINE AND REHAB | Facility: CLINIC | Age: 69
End: 2024-03-05

## 2024-03-05 ENCOUNTER — TELEPHONE (OUTPATIENT)
Dept: NEUROLOGY | Facility: CLINIC | Age: 69
End: 2024-03-05

## 2024-03-05 ENCOUNTER — OFFICE VISIT (OUTPATIENT)
Dept: NEUROLOGY | Facility: CLINIC | Age: 69
End: 2024-03-05
Payer: MEDICARE

## 2024-03-05 VITALS — BODY MASS INDEX: 32.88 KG/M2 | HEIGHT: 69 IN | WEIGHT: 222 LBS

## 2024-03-05 DIAGNOSIS — M54.81 OCCIPITAL NEURALGIA OF RIGHT SIDE: Primary | ICD-10-CM

## 2024-03-05 RX ORDER — LIDOCAINE HYDROCHLORIDE 10 MG/ML
1 INJECTION, SOLUTION INFILTRATION; PERINEURAL ONCE
Status: COMPLETED | OUTPATIENT
Start: 2024-03-05 | End: 2024-03-05

## 2024-03-05 RX ORDER — TRIAMCINOLONE ACETONIDE 40 MG/ML
40 INJECTION, SUSPENSION INTRA-ARTICULAR; INTRAMUSCULAR ONCE
Status: COMPLETED | OUTPATIENT
Start: 2024-03-05 | End: 2024-03-05

## 2024-03-05 NOTE — TELEPHONE ENCOUNTER
[facility]  [facility] [employer]  Status: Approved-authorization is not required per health plan however may be subject to review once claim is submitted        Occipital nerve blocks done today  Next occiiptal nerve blocks due around 6/5/24

## 2024-03-05 NOTE — TELEPHONE ENCOUNTER
This patient has never gotten botox here only occipital nerve blocks which was sent to the front office staff for scheduling.  Please document the most accurate information

## 2024-04-16 DIAGNOSIS — I10 ESSENTIAL HYPERTENSION: ICD-10-CM

## 2024-04-17 RX ORDER — HYDROCHLOROTHIAZIDE 12.5 MG/1
12.5 TABLET ORAL DAILY
Qty: 90 TABLET | Refills: 3 | Status: SHIPPED | OUTPATIENT
Start: 2024-04-17

## 2024-04-17 NOTE — TELEPHONE ENCOUNTER
Please review; protocol failed/No Protocol    Requested Prescriptions   Pending Prescriptions Disp Refills    HYDROCHLOROTHIAZIDE 12.5 MG Oral Tab [Pharmacy Med Name: HYDROCHLOROTHIAZIDE 12.5 MG Tablet] 90 tablet 3     Sig: TAKE 1 TABLET EVERY DAY       Hypertension Medications Protocol Failed - 4/16/2024  9:06 AM        Failed - Last BP reading less than 140/90     BP Readings from Last 1 Encounters:   02/05/24 148/80               Passed - CMP or BMP in past 12 months        Passed - In person appointment or virtual visit in the past 12 mos or appointment in next 3 mos     Recent Outpatient Visits              [facility]    [facility]    2 months ago Neck pain on right side    [facility] Luis Eduardo Chavez DO    Office Visit    4 months ago [facility]    [facility] Visit    5 months ago Follow-up exam    [facility] Lilian Ohara APRN    Office Visit    6 months ago Right-sided tinnitus    [facility] Amarilys Abad MD    Office Visit          Future Appointments         Provider Department Appt Notes    In 1 month Marshall Bueno MD [facility] [place] nerve block                    Passed - EGFRCR or GFRAA > 50     GFR Evaluation  EGFRCR: 61 , resulted on 10/31/2023             Future Appointments         Provider Department Appt Notes    In 1 month Marshall Bueno MD [facility] [place] nerve block          Recent Outpatient Visits              1 month ago [facility]ip[facility] of right [facility]    [facility]    2  months ago Neck pain on right side    [facility] Luis Eduardo Chavez DO    Office Visit    4 months ago Occipital neuralgia of right side    [facility] Marshall Bueno MD    Office Visit    5 months ago Follow-up exam    [facility] Lilian Ohara APRN    Office Visit    6 months ago Right-sided tinnitus    [facility] Amarilys Abad MD    Office Visit

## 2024-06-04 ENCOUNTER — MED REC SCAN ONLY (OUTPATIENT)
Dept: NEUROLOGY | Facility: CLINIC | Age: 69
End: 2024-06-04

## 2024-06-04 ENCOUNTER — TELEPHONE (OUTPATIENT)
Dept: NEUROLOGY | Facility: CLINIC | Age: 69
End: 2024-06-04

## 2024-06-04 ENCOUNTER — OFFICE VISIT (OUTPATIENT)
Dept: NEUROLOGY | Facility: CLINIC | Age: 69
End: 2024-06-04
Payer: MEDICARE

## 2024-06-04 DIAGNOSIS — M54.81 OCCIPITAL NEURALGIA OF RIGHT SIDE: Primary | ICD-10-CM

## 2024-06-04 PROCEDURE — 1159F MED LIST DOCD IN RCRD: CPT | Performed by: OTHER

## 2024-06-04 PROCEDURE — 64405 NJX AA&/STRD GR OCPL NRV: CPT | Performed by: OTHER

## 2024-06-04 PROCEDURE — 1160F RVW MEDS BY RX/DR IN RCRD: CPT | Performed by: OTHER

## 2024-06-04 RX ORDER — TRIAMCINOLONE ACETONIDE 40 MG/ML
40 INJECTION, SUSPENSION INTRA-ARTICULAR; INTRAMUSCULAR ONCE
Status: COMPLETED | OUTPATIENT
Start: 2024-06-04 | End: 2024-06-04

## 2024-06-04 RX ORDER — LIDOCAINE HYDROCHLORIDE 10 MG/ML
1 INJECTION, SOLUTION INFILTRATION; PERINEURAL ONCE
Status: COMPLETED | OUTPATIENT
Start: 2024-06-04 | End: 2024-06-04

## 2024-06-04 NOTE — TELEPHONE ENCOUNTER
Initiated authorization for Occipital nerve block [facility],  with [employer]  Status: Approved-authorization is not required per health plan based on medical necessity however is not a guarantee of payment and may be subject to review once claim is submitted

## 2024-08-07 ENCOUNTER — OFFICE VISIT (OUTPATIENT)
Dept: NEUROLOGY | Facility: CLINIC | Age: 69
End: 2024-08-07
Payer: MEDICARE

## 2024-08-07 DIAGNOSIS — M54.81 OCCIPITAL NEURALGIA OF RIGHT SIDE: Primary | ICD-10-CM

## 2024-08-07 DIAGNOSIS — G44.321 INTRACTABLE CHRONIC POST-TRAUMATIC HEADACHE: ICD-10-CM

## 2024-08-07 PROCEDURE — 1160F RVW MEDS BY RX/DR IN RCRD: CPT | Performed by: OTHER

## 2024-08-07 PROCEDURE — 1159F MED LIST DOCD IN RCRD: CPT | Performed by: OTHER

## 2024-08-07 PROCEDURE — 64405 NJX AA&/STRD GR OCPL NRV: CPT | Performed by: OTHER

## 2024-08-07 RX ORDER — LIDOCAINE HYDROCHLORIDE 10 MG/ML
1 INJECTION, SOLUTION INFILTRATION; PERINEURAL ONCE
Status: COMPLETED | OUTPATIENT
Start: 2024-08-07 | End: 2024-08-07

## 2024-08-07 RX ORDER — TRIAMCINOLONE ACETONIDE 40 MG/ML
40 INJECTION, SUSPENSION INTRA-ARTICULAR; INTRAMUSCULAR ONCE
Status: COMPLETED | OUTPATIENT
Start: 2024-08-07 | End: 2024-08-07

## 2024-08-08 ENCOUNTER — MED REC SCAN ONLY (OUTPATIENT)
Dept: NEUROLOGY | Facility: CLINIC | Age: 69
End: 2024-08-08

## 2024-09-16 ENCOUNTER — HOSPITAL ENCOUNTER (OUTPATIENT)
Age: 69
Discharge: HOME | End: 2024-09-16
Attending: EMERGENCY MEDICINE
Payer: COMMERCIAL

## 2024-09-16 VITALS
BODY MASS INDEX: 31.65 KG/M2 | OXYGEN SATURATION: 99 % | SYSTOLIC BLOOD PRESSURE: 140 MMHG | RESPIRATION RATE: 20 BRPM | TEMPERATURE: 98 F | HEART RATE: 75 BPM | HEIGHT: 65 IN | DIASTOLIC BLOOD PRESSURE: 62 MMHG | WEIGHT: 190 LBS

## 2024-09-16 DIAGNOSIS — L25.3 CONTACT DERMATITIS DUE TO OTHER CHEMICAL PRODUCT, UNSPECIFIED CONTACT DERMATITIS TYPE: Primary | ICD-10-CM

## 2024-09-16 PROCEDURE — 99213 OFFICE O/P EST LOW 20 MIN: CPT

## 2024-09-16 RX ORDER — TRIAMCINOLONE ACETONIDE 1 MG/G
CREAM TOPICAL 2 TIMES DAILY
Qty: 45 G | Refills: 0 | Status: SHIPPED | OUTPATIENT
Start: 2024-09-16

## 2024-09-16 NOTE — ED INITIAL ASSESSMENT (HPI)
C/o onset Saturday blisters to left lower leg and right lower leg itchy.  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

## 2024-09-16 NOTE — ED PROVIDER NOTES
Patient Seen in: [facility]      History     Chief Complaint   Patient presents with    Blisters     Stated Complaint: rash on legs    Subjective:   HPI    Patient comes to immediate care with blistering and rash on her lower extremities in the area that was wrapped up with a warm towel that had an odor of something that the patient could not identify.  This occurred at a local salon where patient was receiving a pedicure.  The affected area is intensely pruritic with some mild discomfort.  There has been blistering and rupture of the blisters.  No other cutaneous abnormalities are noted.    Objective:   Past Medical History:    Back pain    Essential hypertension              History reviewed. No pertinent surgical history.             Social History     Socioeconomic History    Marital status:    Tobacco Use    Smoking status: Never    Smokeless tobacco: Never   Vaping Use    Vaping status: Never Used   Substance and Sexual Activity    Alcohol use: Never    Drug use: Never   Other Topics Concern    Caffeine Concern Yes     Comment: coffee and tea    Exercise Yes     Comment: PT   Social History Narrative    The patient uses the following assistive device(s):  single-point cane.      The patient does live in a home with stairs.     Social Determinants of Health      [facility]    Social Connections    [facility]    Housing Stability              Review of Systems    Positive for stated Chief Complaint: Blisters    Other systems are as noted in HPI.  Constitutional and vital signs reviewed.      All other systems reviewed and negative except as noted above.    Physical Exam     ED Triage Vitals [09/16/24 1840]   /62   Pulse 75   Resp 20   Temp 98 °F (36.7 °C)   Temp src Temporal   SpO2 99 %   O2 Device None (Room air)       Current Vitals:   Vital  Signs  BP: 140/62  Pulse: 75  Resp: 20  Temp: 98 °F (36.7 °C)  Temp src: Temporal    Oxygen Therapy  SpO2: 99 %  O2 Device: None (Room air)            Physical Exam  Vitals and nursing note reviewed.   Constitutional:       General: She is not in acute distress.     Appearance: Normal appearance. She is well-developed.   Skin:     Comments: Patient is noted to have bullae noted in scattered areas of the left leg, particularly on the proximal medial aspect of the leg.  No surrounding erythema or tenderness suggesting acute bacterial infection.   Neurological:      Mental Status: She is alert and oriented to person, place, and time.                    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  There is itching, but minimal pain.  There is no clinical evidence of cellulitis or necrotizing soft tissue infection.  Because of the constellation of symptoms, patient will be treated with topical steroid cream.  Patient does not appear to have clinical features suggesting acute infection requiring antibiotics at this time.  Patient was discharged home with instructed follow-up with primary care and to return for any acute change or worsening of symptoms.                                   Medical Decision Making      Disposition and Plan     Clinical Impression:  1. Contact dermatitis due to other chemical product, unspecified contact dermatitis type         Disposition:  Discharge  9/16/2024  6:55 pm    Follow-up:  Luis Eduardo Chavez, DO  [address] [place] [address] [id number] [phone]                Medications Prescribed:  Current Discharge Medication List        START taking these medications    Details   triamcinolone 0.1 % External Cream Apply topically 2 (two) times daily.  Qty: 45 g, Refills: 0

## 2024-09-18 ENCOUNTER — TELEPHONE (OUTPATIENT)
Dept: FAMILY MEDICINE CLINIC | Facility: CLINIC | Age: 69
End: 2024-09-18

## 2024-09-18 NOTE — TELEPHONE ENCOUNTER
Condition Update  [facility] was [facility]16/[facility], prescribed triamcinolone creasm.  Had blisters on left leg  She had used Neospoin on it.  Was diagnosed with contact dermatitis due to chemical.  She thinks it was due to some chemicals in the towel they used at a salon where she had a pedicare.  Blistered area is painful to touch.  Plan: scheduled appointment to evaluate the skin.  Patient stated understanding.    Future Appointments   Date Time Provider Department Center   9/19/2024 12:50 PM Kaley Ruvalcaba MD [facility]   10/16/2024  1:45 PM Marshall Bueno MD [facility]

## 2024-09-19 ENCOUNTER — OFFICE VISIT (OUTPATIENT)
Dept: FAMILY MEDICINE CLINIC | Facility: CLINIC | Age: 69
End: 2024-09-19

## 2024-09-19 VITALS
HEIGHT: 65 IN | HEART RATE: 60 BPM | WEIGHT: 205.19 LBS | OXYGEN SATURATION: 99 % | BODY MASS INDEX: 34.19 KG/M2 | SYSTOLIC BLOOD PRESSURE: 143 MMHG | DIASTOLIC BLOOD PRESSURE: 81 MMHG

## 2024-09-19 DIAGNOSIS — L30.9 DERMATITIS: Primary | ICD-10-CM

## 2024-09-19 DIAGNOSIS — I10 ESSENTIAL HYPERTENSION: ICD-10-CM

## 2024-09-19 PROCEDURE — 3077F SYST BP >= 140 MM HG: CPT | Performed by: FAMILY MEDICINE

## 2024-09-19 PROCEDURE — 1160F RVW MEDS BY RX/DR IN RCRD: CPT | Performed by: FAMILY MEDICINE

## 2024-09-19 PROCEDURE — 3079F DIAST BP 80-89 MM HG: CPT | Performed by: FAMILY MEDICINE

## 2024-09-19 PROCEDURE — 99213 OFFICE O/P EST LOW 20 MIN: CPT | Performed by: FAMILY MEDICINE

## 2024-09-19 PROCEDURE — 3008F BODY MASS INDEX DOCD: CPT | Performed by: FAMILY MEDICINE

## 2024-09-19 PROCEDURE — 1159F MED LIST DOCD IN RCRD: CPT | Performed by: FAMILY MEDICINE

## 2024-09-19 PROCEDURE — 1125F AMNT PAIN NOTED PAIN PRSNT: CPT | Performed by: FAMILY MEDICINE

## 2024-09-19 PROCEDURE — 1111F DSCHRG MED/CURRENT MED MERGE: CPT | Performed by: FAMILY MEDICINE

## 2024-09-19 RX ORDER — HYDROCHLOROTHIAZIDE 12.5 MG/1
12.5 TABLET ORAL DAILY
Qty: 90 TABLET | Refills: 3 | Status: SHIPPED | OUTPATIENT
Start: 2024-09-19

## 2024-09-19 RX ORDER — PREDNISONE 20 MG/1
TABLET ORAL
Qty: 27 TABLET | Refills: 0 | Status: SHIPPED | OUTPATIENT
Start: 2024-09-19

## 2024-09-19 RX ORDER — ACETAMINOPHEN 500 MG
500 TABLET ORAL EVERY 6 HOURS PRN
Qty: 50 TABLET | Refills: 0 | Status: SHIPPED | OUTPATIENT
Start: 2024-09-19

## 2024-09-19 NOTE — PROGRESS NOTES
Subjective:   Patient ID: Peg Garcia is a 69 year old female.    HPI  Patient developed vesicular itchy rash on the legs after being at the pedicure and having some towel wraoped around her   Was in er has still itching both legs and some new lesion no fever no pain  History/Other:   Review of Systems  Constitutional: Negative.  Negative for activity change, appetite change, diaphoresis and fatigue.     Respiratory: Negative.  Negative for apnea, cough, chest tightness and shortness of breath.    Cardiovascular: Negative.  Negative for chest pain, palpitations and leg swelling.   Gastrointestinal: Negative.  Negative for abdominal pain.   Skin: see hpi    Current Outpatient Medications   Medication Sig Dispense Refill    hydroCHLOROthiazide 12.5 MG Oral Tab Take 1 tablet (12.5 mg total) by mouth daily. 90 tablet 3    acetaminophen 500 MG Oral Tab Take 1 tablet (500 mg total) by mouth every 6 (six) hours as needed for Pain. 50 tablet 0    predniSONE 20 MG Oral Tab 60 mg po every day for 3 days then 40 mg po every day for 5 days then 20 mg po every day for 5 days then 10 mg po every day for 5 days 27 tablet 0    triamcinolone 0.1 % External Cream Apply topically 2 (two) times daily. 45 g 0    amitriptyline 25 MG Oral Tab Take 2 tablets (50 mg total) by mouth nightly. 180 tablet 3    diazePAM 2 MG Oral Tab Take 1 tablet (2 mg total) by mouth 3 (three) times daily as needed (muscle spasm). 12 tablet 0    pregabalin (LYRICA) 150 MG Oral Cap Take 1 capsule (150 mg total) by mouth 2 (two) times daily. 60 capsule 0    levocetirizine 5 MG Oral Tab Take 1 tablet (5 mg total) by mouth every evening. 30 tablet 1    lidocaine 5 % External Ointment Apply 1 Application topically as needed. 240 g 0    nortriptyline 25 MG Oral Cap TAKE 1 CAPSULE BY MOUTH EVERY NIGHT AT BEDTIME. CAN INCREASE TO 2 CAPSULE BY MOUTH EVERY NIGHT AT BEDTIME AS NEEDED. DISCONTINUE AMITRIPTYLINE. (Patient not taking: Reported on 9/19/2024)       methylPREDNISolone (MEDROL) 4 MG Oral Tablet Therapy Pack As directed. (Patient not taking: Reported on 2024) 1 each 0    tiZANidine HCl 4 MG Oral Cap  (Patient not taking: Reported on 2024)      methylPREDNISolone (MEDROL) 4 MG Oral Tablet Therapy Pack As directed. (Patient not taking: Reported on 2024) 21 each 0    Misc. Devices (WALKER) Does not apply Misc Use as discussed. 1 each 0     Allergies:No Known Allergies    Objective:   Physical Exam  Constitutional:       Appearance: Normal appearance.   Cardiovascular:      Rate and Rhythm: Normal rate and regular rhythm.      Pulses: Normal pulses.      Heart sounds: Normal heart sounds.   Skin:     Comments: Vesicular rash on the left leg ( bulous )   Neurological:      Mental Status: She is alert.         Assessment & Plan:   1. Dermatitis    2. Essential hypertension    Appears as pemphigus   No signs pof infection   Start prednsione   F/u in 2 weeks with derm of call earlier if no significant imporvement    No orders of the defined types were placed in this encounter.      Meds This Visit:  Requested Prescriptions     Signed Prescriptions Disp Refills    hydroCHLOROthiazide 12.5 MG Oral Tab 90 tablet 3     Sig: Take 1 tablet (12.5 mg total) by mouth daily.    acetaminophen 500 MG Oral Tab 50 tablet 0     Sig: Take 1 tablet (500 mg total) by mouth every 6 (six) hours as needed for Pain.    predniSONE 20 MG Oral Tab 27 tablet 0     Si mg po every day for 3 days then 40 mg po every day for 5 days then 20 mg po every day for 5 days then 10 mg po every day for 5 days       Imaging & Referrals:  [facility] - INTERNAL

## 2024-09-25 ENCOUNTER — TELEPHONE (OUTPATIENT)
Dept: FAMILY MEDICINE CLINIC | Facility: CLINIC | Age: 69
End: 2024-09-25

## 2024-09-26 ENCOUNTER — TELEPHONE (OUTPATIENT)
Dept: FAMILY MEDICINE CLINIC | Facility: CLINIC | Age: 69
End: 2024-09-26

## 2024-10-09 ENCOUNTER — OFFICE VISIT (OUTPATIENT)
Dept: DERMATOLOGY CLINIC | Facility: CLINIC | Age: 69
End: 2024-10-09
Payer: COMMERCIAL

## 2024-10-09 DIAGNOSIS — L30.9 DERMATITIS: Primary | ICD-10-CM

## 2024-10-09 PROCEDURE — 1160F RVW MEDS BY RX/DR IN RCRD: CPT | Performed by: STUDENT IN AN ORGANIZED HEALTH CARE EDUCATION/TRAINING PROGRAM

## 2024-10-09 PROCEDURE — 1159F MED LIST DOCD IN RCRD: CPT | Performed by: STUDENT IN AN ORGANIZED HEALTH CARE EDUCATION/TRAINING PROGRAM

## 2024-10-09 PROCEDURE — 99203 OFFICE O/P NEW LOW 30 MIN: CPT | Performed by: STUDENT IN AN ORGANIZED HEALTH CARE EDUCATION/TRAINING PROGRAM

## 2024-10-09 NOTE — PROGRESS NOTES
October 9, 2024    New patient     Referred by:   Dr. Ruvalcaba    CHIEF COMPLAINT: Dermatitis    HISTORY OF PRESENT ILLNESS: .    1. Dermatitis  Location: Left leg   Duration: 9/13/24  Signs and symptoms: started as a blister and opened up on its own. After doing a pedicure. Currently still itchy with stabbing pain.  Current treatment: Triamcinolone 0.1%  Past treatments: None      DERM HISTORY:  History of skin cancer: No  History of chronic skin disease/condition: No    FAMILY HISTORY:  History of melanoma: No  History of chronic skin disease/condition: No    History/Other:    REVIEW OF SYSTEMS:  Constitutional: Denies fever, chills, unintentional weight loss.   Skin as per HPI    PAST MEDICAL HISTORY:  Past Medical History:    Back pain    Essential hypertension       Medications  Current Outpatient Medications   Medication Sig Dispense Refill    hydroCHLOROthiazide 12.5 MG Oral Tab Take 1 tablet (12.5 mg total) by mouth daily. 90 tablet 3    acetaminophen 500 MG Oral Tab Take 1 tablet (500 mg total) by mouth every 6 (six) hours as needed for Pain. 50 tablet 0    predniSONE 20 MG Oral Tab 60 mg po every day for 3 days then 40 mg po every day for 5 days then 20 mg po every day for 5 days then 10 mg po every day for 5 days 27 tablet 0    triamcinolone 0.1 % External Cream Apply topically 2 (two) times daily. 45 g 0    nortriptyline 25 MG Oral Cap TAKE 1 CAPSULE BY MOUTH EVERY NIGHT AT BEDTIME. CAN INCREASE TO 2 CAPSULE BY MOUTH EVERY NIGHT AT BEDTIME AS NEEDED. DISCONTINUE AMITRIPTYLINE. (Patient not taking: Reported on 9/19/2024)      methylPREDNISolone (MEDROL) 4 MG Oral Tablet Therapy Pack As directed. (Patient not taking: Reported on 9/16/2024) 1 each 0    tiZANidine HCl 4 MG Oral Cap  (Patient not taking: Reported on 9/19/2024)      amitriptyline 25 MG Oral Tab Take 2 tablets (50 mg total) by mouth nightly. 180 tablet 3    diazePAM 2 MG Oral Tab Take 1 tablet (2 mg total) by mouth 3 (three) times daily as needed  (muscle spasm). 12 tablet 0    pregabalin (LYRICA) 150 MG Oral Cap Take 1 capsule (150 mg total) by mouth 2 (two) times daily. 60 capsule 0    levocetirizine 5 MG Oral Tab Take 1 tablet (5 mg total) by mouth every evening. 30 tablet 1    methylPREDNISolone (MEDROL) 4 MG Oral Tablet Therapy Pack As directed. (Patient not taking: Reported on 9/16/2024) 21 each 0    lidocaine 5 % External Ointment Apply 1 Application topically as needed. 240 g 0    Misc. Devices (WALKER) Does not apply Misc Use as discussed. 1 each 0       Objective:    PHYSICAL EXAM:  General: awake, alert, no acute distress  Skin: Skin exam was performed today including the following: L leg. Pertinent findings include:   - with numerous healing erosion    ASSESSMENT & PLAN:  Pathophysiology of diagnoses discussed with patient.  Therapeutic options reviewed. Risks, benefits, and alternatives discussed with patient. Instructions reviewed at length.    #Dermatitis- suspect allergic contact dermatitis, now resolvong   - Ok to hold triamcinolone at this time   - Recommended vaseline twice daily  to site. Discussed risk on PIH and scarring as a result of the blisters.     Return to clinic: with rash recurrence or sooner if something concerning arises     Cleve Cash MD

## 2024-10-16 ENCOUNTER — OFFICE VISIT (OUTPATIENT)
Dept: NEUROLOGY | Facility: CLINIC | Age: 69
End: 2024-10-16
Payer: MEDICARE

## 2024-10-16 ENCOUNTER — MED REC SCAN ONLY (OUTPATIENT)
Dept: NEUROLOGY | Facility: CLINIC | Age: 69
End: 2024-10-16

## 2024-10-16 ENCOUNTER — HOSPITAL ENCOUNTER (OUTPATIENT)
Dept: MAMMOGRAPHY | Age: 69
Discharge: HOME | End: 2024-10-16
Attending: FAMILY MEDICINE
Payer: COMMERCIAL

## 2024-10-16 ENCOUNTER — TELEPHONE (OUTPATIENT)
Dept: PHYSICAL MEDICINE AND REHAB | Facility: CLINIC | Age: 69
End: 2024-10-16

## 2024-10-16 DIAGNOSIS — G43.709 CHRONIC MIGRAINE W/O AURA W/O STATUS MIGRAINOSUS, NOT INTRACTABLE: ICD-10-CM

## 2024-10-16 DIAGNOSIS — M54.81 OCCIPITAL NEURALGIA OF RIGHT SIDE: Primary | ICD-10-CM

## 2024-10-16 DIAGNOSIS — Z12.31 ENCOUNTER FOR SCREENING MAMMOGRAM FOR BREAST CANCER: ICD-10-CM

## 2024-10-16 PROCEDURE — 1159F MED LIST DOCD IN RCRD: CPT | Performed by: OTHER

## 2024-10-16 PROCEDURE — 64405 NJX AA&/STRD GR OCPL NRV: CPT | Performed by: OTHER

## 2024-10-16 PROCEDURE — 99213 OFFICE O/P EST LOW 20 MIN: CPT | Performed by: OTHER

## 2024-10-16 PROCEDURE — 77067 SCR MAMMO BI INCL CAD: CPT | Performed by: FAMILY MEDICINE

## 2024-10-16 PROCEDURE — 1160F RVW MEDS BY RX/DR IN RCRD: CPT | Performed by: OTHER

## 2024-10-16 PROCEDURE — 77063 BREAST TOMOSYNTHESIS BI: CPT | Performed by: FAMILY MEDICINE

## 2024-10-16 RX ORDER — TRIAMCINOLONE ACETONIDE 40 MG/ML
80 INJECTION, SUSPENSION INTRA-ARTICULAR; INTRAMUSCULAR ONCE
Status: COMPLETED | OUTPATIENT
Start: 2024-10-16 | End: 2024-10-16

## 2024-10-16 RX ORDER — LIDOCAINE HYDROCHLORIDE 10 MG/ML
2 INJECTION, SOLUTION INFILTRATION; PERINEURAL ONCE
Status: COMPLETED | OUTPATIENT
Start: 2024-10-16 | End: 2024-10-16

## 2024-10-16 RX ORDER — LIDOCAINE HYDROCHLORIDE 10 MG/ML
1 INJECTION, SOLUTION INFILTRATION; PERINEURAL ONCE
Status: DISCONTINUED | OUTPATIENT
Start: 2024-10-16 | End: 2024-10-16

## 2024-10-16 RX ORDER — TRIAMCINOLONE ACETONIDE 40 MG/ML
40 INJECTION, SUSPENSION INTRA-ARTICULAR; INTRAMUSCULAR ONCE
Status: DISCONTINUED | OUTPATIENT
Start: 2024-10-16 | End: 2024-10-16

## 2024-10-16 NOTE — PROGRESS NOTES
Neurology follow-up visit     Referred By: Dr. Piedra ref. provider found    Chief Complaint:   Chief Complaint   Patient presents with    Injection     Bilateral occipital nerve block       HPI:     Peg Garcia is a 69 year old female, who presents for history of falling and hitting her head and then the other episode when something fell on her head while she was in the store.  Some rocks stumbled on her neck and hand, she had CAT scan done back in May and then repeated again in July 2023 and those were not showing any acute changes.  However she continued to have neck pain and headaches.  She did see specialist about her neck pain and received epidural shot apparently.  However she continued to have this shooting sharp pains through the back of the head especially in the right side, sometimes on the left side, shooting into the temple sometimes.  Denies any focal neurological symptoms otherwise.  She did feel uncomfortable with those shooting pains for about 15 minutes happening few times in the.     We increased dose of amitriptyline and started to explore nerve blocks.  It worked for few months, but then by the middle of 2024 her headaches were getting worse, and she had to increase the frequency of nerve blocks.  She describing headaches as sharp shooting pains, lasting for 4 hours each time, occurring at least 4 times a week, associated with light sensitivity and severe nausea.    Past Medical History:    Back pain    Essential hypertension       History reviewed. No pertinent surgical history.    Social history:  History   Smoking Status    Never   Smokeless Tobacco    Never     History   Alcohol Use Never     History   Drug Use Unknown       History reviewed. No pertinent family history.      Current Outpatient Medications:     hydroCHLOROthiazide 12.5 MG Oral Tab, Take 1 tablet (12.5 mg total) by mouth daily., Disp: 90 tablet, Rfl: 3    acetaminophen 500 MG Oral Tab, Take 1 tablet (500 mg total) by mouth  every 6 (six) hours as needed for Pain., Disp: 50 tablet, Rfl: 0    predniSONE 20 MG Oral Tab, 60 mg po every day for 3 days then 40 mg po every day for 5 days then 20 mg po every day for 5 days then 10 mg po every day for 5 days (Patient not taking: Reported on 10/9/2024), Disp: 27 tablet, Rfl: 0    triamcinolone 0.1 % External Cream, Apply topically 2 (two) times daily., Disp: 45 g, Rfl: 0    nortriptyline 25 MG Oral Cap, TAKE 1 CAPSULE BY MOUTH EVERY NIGHT AT BEDTIME. CAN INCREASE TO 2 CAPSULE BY MOUTH EVERY NIGHT AT BEDTIME AS NEEDED. DISCONTINUE AMITRIPTYLINE. (Patient not taking: Reported on 10/9/2024), Disp: , Rfl:     methylPREDNISolone (MEDROL) 4 MG Oral Tablet Therapy Pack, As directed. (Patient not taking: Reported on 10/9/2024), Disp: 1 each, Rfl: 0    tiZANidine HCl 4 MG Oral Cap, , Disp: , Rfl:     amitriptyline 25 MG Oral Tab, Take 2 tablets (50 mg total) by mouth nightly., Disp: 180 tablet, Rfl: 3    diazePAM 2 MG Oral Tab, Take 1 tablet (2 mg total) by mouth 3 (three) times daily as needed (muscle spasm)., Disp: 12 tablet, Rfl: 0    pregabalin (LYRICA) 150 MG Oral Cap, Take 1 capsule (150 mg total) by mouth 2 (two) times daily., Disp: 60 capsule, Rfl: 0    levocetirizine 5 MG Oral Tab, Take 1 tablet (5 mg total) by mouth every evening., Disp: 30 tablet, Rfl: 1    methylPREDNISolone (MEDROL) 4 MG Oral Tablet Therapy Pack, As directed. (Patient not taking: Reported on 10/9/2024), Disp: 21 each, Rfl: 0    lidocaine 5 % External Ointment, Apply 1 Application topically as needed., Disp: 240 g, Rfl: 0    Misc. Devices (WALKER) Does not apply Misc, Use as discussed., Disp: 1 each, Rfl: 0    No Known Allergies    ROS:   As in HPI, the rest of the 14 system review was done and was negative      Physical Exam:  There were no vitals filed for this visit.    General: No apparent distress, well nourished, well groomed.  Head- Normocephalic, atraumatic  Eyes- No redness or swelling  ENT- Hearing intake, normal  glutition  Neck- No masses or adenopathy  Cv: pulses were palpable and normal, no cyanosis or edema     Neurological:     Mental Status- Alert and oriented x3.  Normal attention span and concentration  Thought process intact  Memory intact- recent and remote  Mood intact  Fund of knowledge appropriate for education and age    Language intact including: comprehension, naming, repetition, vocabulary    Cranial Nerves:  Tenderness to palpation over the both occipital especially on the right side.  VII. Face symmetric, no facial weakness  VIII. Hearing intact to whisper.  IX. Pallet elevates symmetrically.  XI. Shoulder shrug is intact  XII. Tongue is midline    Motor Exam:  Muscle tone normal  No atrophy or fasciculations  Strength- upper extremities 5/5 proximally and distally                    Coordination:  Finger to nose intact  Rapid alternating movements intact    Gait:  Normal posture  Normal physiologic      Labs:    Lab Results   Component Value Date    TSH 1.440 09/14/2023     Lab Results   Component Value Date    HDL 70 (H) 09/14/2023     (H) 09/14/2023    TRIG 84 09/14/2023     Lab Results   Component Value Date    HGB 12.7 10/31/2023    HCT 36.9 10/31/2023    MCV 94.6 10/31/2023    WBC 6.2 10/31/2023    .0 10/31/2023      Lab Results   Component Value Date    BUN 9 10/31/2023    CA 9.6 10/31/2023    ALT 62 (H) 09/14/2023    AST 27 09/14/2023    ALB 3.3 (L) 09/14/2023     10/31/2023    K 3.3 (L) 10/31/2023     10/31/2023    CO2 28.0 10/31/2023      I have reviewed labs.    Imaging Studies:  I have independently reviewed imaging.  CT of the head was intimately reviewed, no obvious acute changes.  Patient seems to be describing some combination of posttraumatic headache, possible occipital neuralgia.          Assessment   1. Intractable chronic post-traumatic headache  We will try occipital nerve block on both sides as well as we will increase the dose of Elavil.  At this point  seems to be developing chronic migraines.  Therefore the next trial will be of Botox, she has tried amitriptyline and Lyrica with no significant benefit so far.    2. Occipital neuralgia of right side       Education and counseling provided to patient. Instructed patient to call my office or seek medical attention immediately if symptoms worsen.  Patient verbalized understanding of information given. All questions were answered. All side effects of drugs were discussed.       Return to clinic in: No follow-ups on file.    Marshall Bueno MD

## 2024-10-16 NOTE — TELEPHONE ENCOUNTER
Initiated authorization for Bilateral occipital nerve block. CPT/HCPCS 21540-13, dx:G43.709 with [employer]    Status: Approved - no auth required  Authorization is not required based on medical necessity when being performed, however is not a guarantee of payment and may be subject to review once claim is submitted-Covered Benefit

## 2024-10-17 ENCOUNTER — TELEPHONE (OUTPATIENT)
Dept: PHYSICAL MEDICINE AND REHAB | Facility: CLINIC | Age: 69
End: 2024-10-17

## 2024-10-17 NOTE — TELEPHONE ENCOUNTER
Initiated authorization for Botox 200U. CPT/HCPCS , 68722 dx:G43.709 with [employer]    Clinicals uploaded to [employer]  Status: Pending  Case # [id number]

## 2024-10-18 NOTE — TELEPHONE ENCOUNTER
Insurance: [employer] Member ID# [id number]   Medication: Botox 200 units  Number of visits Approved: 5  (PLEASE INFORM THE PATIENT TO CONTACT THE OFFICE IF THE INSURANCE CHANGES WITHIN THE YEAR AS HE/SHE WILL BE RESPONSIBLE TO UPDATE THE OFFICE IF INSURANCE CHANGES SO THAT PROCEDURE IS BILLED CORRECTLY) this will be 1 of 5  Dx: G43.709  Last Botox done: n/a  Next Botox due around: Now  Authorization # [id number]  Valid 10/17/24-12/31/28  BUY&BILL    Please contact to call to schedule

## 2024-10-21 ENCOUNTER — MED REC SCAN ONLY (OUTPATIENT)
Dept: NEUROLOGY | Facility: CLINIC | Age: 69
End: 2024-10-21

## 2024-10-22 ENCOUNTER — OFFICE VISIT (OUTPATIENT)
Dept: NEUROLOGY | Facility: CLINIC | Age: 69
End: 2024-10-22
Payer: MEDICARE

## 2024-10-22 DIAGNOSIS — G43.709 CHRONIC MIGRAINE W/O AURA W/O STATUS MIGRAINOSUS, NOT INTRACTABLE: Primary | ICD-10-CM

## 2024-10-22 PROCEDURE — 1159F MED LIST DOCD IN RCRD: CPT | Performed by: OTHER

## 2024-10-22 PROCEDURE — 1160F RVW MEDS BY RX/DR IN RCRD: CPT | Performed by: OTHER

## 2024-10-22 PROCEDURE — 64615 CHEMODENERV MUSC MIGRAINE: CPT | Performed by: OTHER

## 2024-10-22 NOTE — PROGRESS NOTES
Paperwork noting that patient may bear financial responsibility for procedure(s) performed in clinic today signed prior to proceeding with procedure(s).    Furthermore, patient notified that they should contact their insurer to verify that your procedure/test has been approved and is a COVERED benefit.  Although the [facility] staff does its due diligence, the insurance carrier gives the disclaimer that \"Although the procedure is authorized, this does not guarantee payment.\"    Ultimately the patient is responsible for payment.    Botox is:  [x] Buy and Bill  [] Patient Supplied      Botox Reauthorization Questions:  Has the patient experienced a reduction in frequency of migraines since starting Botox? yes  If yes, by what percentage? 75%  Has the intensity of migraines decreased since starting Botox? yes  If yes, by what percentage? 75%    [x] I have discussed with patient that if their insurance changes they must contact the office right away with that information so that a new prior authorization can be completed.  Patient verbalized understanding that Botox cannot be performed without a current prior authorization in place with correct insurance.

## 2024-11-19 ENCOUNTER — NURSE TRIAGE (OUTPATIENT)
Dept: FAMILY MEDICINE CLINIC | Facility: CLINIC | Age: 69
End: 2024-11-19

## 2024-11-19 NOTE — TELEPHONE ENCOUNTER
Action Requested: Summary for Provider     []  Critical Lab, Recommendations Needed  [] Need Additional Advice  []   FYI    []   Need Orders  [] Need Medications Sent to Pharmacy  []  Other     SUMMARY: Patient reports significant anxiety related to dermatitis on legs.  Feels as though she has \"bugs crawling.\"  Can't sleep at night.  Denies suicidal thoughts.  Wants referral to behavioral health.  Per protocol, scheduled office visit.    Reason for call: Patient reports sensation of bugs crawling on/in her, reports anxiety  Onset: September 2024    Reason for Disposition   MODERATE anxiety (e.g., persistent or frequent anxiety symptoms; interferes with sleep, school, or work)    Protocols used: Anxiety and Panic Attack-A-[place]    Patient reports went for pedicure 9/13/2024.  Was concerned about a \"nasty towel\" and other equipment that was used.  She developed blister and later rash on legs.  [facility], then Dr Ruvalcaba, then to dermatologist.    Feels very anxious that she has bugs crawling on her and inside her.  Is affecting her relationship with her . Wants to see a behavioral specialist.

## 2024-11-20 ENCOUNTER — TELEPHONE (OUTPATIENT)
Dept: FAMILY MEDICINE CLINIC | Facility: CLINIC | Age: 69
End: 2024-11-20

## 2024-11-21 ENCOUNTER — TELEPHONE (OUTPATIENT)
Dept: FAMILY MEDICINE CLINIC | Facility: CLINIC | Age: 69
End: 2024-11-21

## 2024-11-21 NOTE — TELEPHONE ENCOUNTER
Patient was seen in the office and was provided a referral to [facility]. I explained the process, what a navigator is and she stated, someone name Kisha left her a message. I provided the phone number on the referral to call. [phone] and also gave her the 24/7 hotline number.

## 2024-12-02 ENCOUNTER — LAB ENCOUNTER (OUTPATIENT)
Dept: LAB | Age: 69
End: 2024-12-02
Attending: FAMILY MEDICINE
Payer: COMMERCIAL

## 2024-12-02 ENCOUNTER — OFFICE VISIT (OUTPATIENT)
Dept: FAMILY MEDICINE CLINIC | Facility: CLINIC | Age: 69
End: 2024-12-02

## 2024-12-02 VITALS
OXYGEN SATURATION: 96 % | HEIGHT: 65 IN | TEMPERATURE: 98 F | BODY MASS INDEX: 32.82 KG/M2 | SYSTOLIC BLOOD PRESSURE: 120 MMHG | WEIGHT: 197 LBS | DIASTOLIC BLOOD PRESSURE: 70 MMHG | HEART RATE: 73 BPM | RESPIRATION RATE: 18 BRPM

## 2024-12-02 DIAGNOSIS — Z23 NEED FOR ZOSTER VACCINATION: Primary | ICD-10-CM

## 2024-12-02 DIAGNOSIS — Z00.00 MEDICARE ANNUAL WELLNESS VISIT, SUBSEQUENT: ICD-10-CM

## 2024-12-02 DIAGNOSIS — I10 ESSENTIAL HYPERTENSION: ICD-10-CM

## 2024-12-02 DIAGNOSIS — M48.02 CERVICAL STENOSIS OF SPINAL CANAL: ICD-10-CM

## 2024-12-02 DIAGNOSIS — M48.02 FORAMINAL STENOSIS OF CERVICAL REGION: ICD-10-CM

## 2024-12-02 DIAGNOSIS — M48.061 SPINAL STENOSIS AT L4-L5 LEVEL: ICD-10-CM

## 2024-12-02 DIAGNOSIS — B00.89 HERPETIC DERMATITIS: ICD-10-CM

## 2024-12-02 LAB
ALBUMIN SERPL-MCNC: 4.4 G/DL (ref 3.2–4.8)
ALBUMIN/GLOB SERPL: 1.6 {RATIO} (ref 1–2)
ALP LIVER SERPL-CCNC: 79 U/L
ALT SERPL-CCNC: 19 U/L
ANION GAP SERPL CALC-SCNC: 5 MMOL/L (ref 0–18)
AST SERPL-CCNC: 21 U/L (ref ?–34)
BASOPHILS # BLD AUTO: 0.02 X10(3) UL (ref 0–0.2)
BASOPHILS NFR BLD AUTO: 0.4 %
BILIRUB SERPL-MCNC: 0.3 MG/DL (ref 0.2–1.1)
BILIRUB UR QL: NEGATIVE
BUN BLD-MCNC: 10 MG/DL (ref 9–23)
BUN/CREAT SERPL: 9 (ref 10–20)
CALCIUM BLD-MCNC: 9.7 MG/DL (ref 8.7–10.4)
CHLORIDE SERPL-SCNC: 111 MMOL/L (ref 98–112)
CHOLEST SERPL-MCNC: 191 MG/DL (ref ?–200)
CO2 SERPL-SCNC: 30 MMOL/L (ref 21–32)
COLOR UR: YELLOW
CREAT BLD-MCNC: 1.11 MG/DL
DEPRECATED RDW RBC AUTO: 49.1 FL (ref 35.1–46.3)
EGFRCR SERPLBLD CKD-EPI 2021: 54 ML/MIN/1.73M2 (ref 60–?)
EOSINOPHIL # BLD AUTO: 0.04 X10(3) UL (ref 0–0.7)
EOSINOPHIL NFR BLD AUTO: 0.8 %
ERYTHROCYTE [DISTWIDTH] IN BLOOD BY AUTOMATED COUNT: 13.5 % (ref 11–15)
FASTING PATIENT LIPID ANSWER: NO
FASTING STATUS PATIENT QL REPORTED: NO
GLOBULIN PLAS-MCNC: 2.7 G/DL (ref 2–3.5)
GLUCOSE BLD-MCNC: 94 MG/DL (ref 70–99)
GLUCOSE UR-MCNC: NORMAL MG/DL
HCT VFR BLD AUTO: 38.5 %
HDLC SERPL-MCNC: 65 MG/DL (ref 40–59)
HGB BLD-MCNC: 12.9 G/DL
HGB UR QL STRIP.AUTO: NEGATIVE
IMM GRANULOCYTES # BLD AUTO: 0.01 X10(3) UL (ref 0–1)
IMM GRANULOCYTES NFR BLD: 0.2 %
KETONES UR-MCNC: 10 MG/DL
LDLC SERPL CALC-MCNC: 110 MG/DL (ref ?–100)
LEUKOCYTE ESTERASE UR QL STRIP.AUTO: 25
LYMPHOCYTES # BLD AUTO: 1.81 X10(3) UL (ref 1–4)
LYMPHOCYTES NFR BLD AUTO: 37.9 %
MCH RBC QN AUTO: 33.2 PG (ref 26–34)
MCHC RBC AUTO-ENTMCNC: 33.5 G/DL (ref 31–37)
MCV RBC AUTO: 99.2 FL
MONOCYTES # BLD AUTO: 0.4 X10(3) UL (ref 0.1–1)
MONOCYTES NFR BLD AUTO: 8.4 %
NEUTROPHILS # BLD AUTO: 2.5 X10 (3) UL (ref 1.5–7.7)
NEUTROPHILS # BLD AUTO: 2.5 X10(3) UL (ref 1.5–7.7)
NEUTROPHILS NFR BLD AUTO: 52.3 %
NITRITE UR QL STRIP.AUTO: NEGATIVE
NONHDLC SERPL-MCNC: 126 MG/DL (ref ?–130)
OSMOLALITY SERPL CALC.SUM OF ELEC: 301 MOSM/KG (ref 275–295)
PH UR: 5.5 [PH] (ref 5–8)
PLATELET # BLD AUTO: 281 10(3)UL (ref 150–450)
POTASSIUM SERPL-SCNC: 3.5 MMOL/L (ref 3.5–5.1)
PROT SERPL-MCNC: 7.1 G/DL (ref 5.7–8.2)
PROT UR-MCNC: 50 MG/DL
RBC # BLD AUTO: 3.88 X10(6)UL
SODIUM SERPL-SCNC: 146 MMOL/L (ref 136–145)
SP GR UR STRIP: >1.03 (ref 1–1.03)
TRIGL SERPL-MCNC: 89 MG/DL (ref 30–149)
TSI SER-ACNC: 1.47 UIU/ML (ref 0.55–4.78)
UROBILINOGEN UR STRIP-ACNC: NORMAL
VLDLC SERPL CALC-MCNC: 15 MG/DL (ref 0–30)
WBC # BLD AUTO: 4.8 X10(3) UL (ref 4–11)
YEAST UR QL: PRESENT /HPF

## 2024-12-02 PROCEDURE — 80061 LIPID PANEL: CPT

## 2024-12-02 PROCEDURE — 85025 COMPLETE CBC W/AUTO DIFF WBC: CPT

## 2024-12-02 PROCEDURE — 36415 COLL VENOUS BLD VENIPUNCTURE: CPT

## 2024-12-02 PROCEDURE — 81001 URINALYSIS AUTO W/SCOPE: CPT

## 2024-12-02 PROCEDURE — 84443 ASSAY THYROID STIM HORMONE: CPT

## 2024-12-02 PROCEDURE — 80053 COMPREHEN METABOLIC PANEL: CPT

## 2024-12-02 NOTE — PROGRESS NOTES
Subjective:     Patient ID: Peg Garcia is a 69 year old female.    This patient is a 69 year old hypertensive AA female with a history of both cervical and lumbar region spinal stenosis which still requires specialty care intervention from physiatry and also pain management as needed here for Medicare annual visit which will satisfy all the requirements for a complete preventive care physical and for status update on any confirmed chronic medical illnesses and follow up on any previous labs or procedures that were suggestive or in need of further work up. Colonoscopy is current. Bowel and bladder functions are intact.    On 09/16/2024 patient experienced the onset of blisters to left lower leg and right lower leg itchy.  The patient experienced these symptoms to Saturday after she was at the pedicure salon on previous Friday night.  She noted that the technician rubbed what appeared to be a towel from a bucket of used tiles on her legs.  Shortly after this application the patient noted warm, \"stingy\" sensation after this application thus after the pedicure was completed.     Zoster vaccine sent to patient's pharmacy.    Patient also reports intermittent challenges with short-term memory or at the very least fogginess to her thought processing.  She states that she has a group of friends and they all concur that they all are experiencing similar type of memory challenges.  No deep alarm at this point for the patient, but she thought enough to make mention of it.              History/Other:   Review of Systems  Current Outpatient Medications   Medication Sig Dispense Refill    hydroCHLOROthiazide 12.5 MG Oral Tab Take 1 tablet (12.5 mg total) by mouth daily. 90 tablet 3    acetaminophen 500 MG Oral Tab Take 1 tablet (500 mg total) by mouth every 6 (six) hours as needed for Pain. 50 tablet 0    tiZANidine HCl 4 MG Oral Cap       diazePAM 2 MG Oral Tab Take 1 tablet (2 mg total) by mouth 3 (three) times daily as  needed (muscle spasm). 12 tablet 0    pregabalin (LYRICA) 150 MG Oral Cap Take 1 capsule (150 mg total) by mouth 2 (two) times daily. 60 capsule 0    levocetirizine 5 MG Oral Tab Take 1 tablet (5 mg total) by mouth every evening. 30 tablet 1    predniSONE 20 MG Oral Tab 60 mg po every day for 3 days then 40 mg po every day for 5 days then 20 mg po every day for 5 days then 10 mg po every day for 5 days (Patient not taking: Reported on 12/2/2024) 27 tablet 0    triamcinolone 0.1 % External Cream Apply topically 2 (two) times daily. (Patient not taking: Reported on 12/2/2024) 45 g 0    nortriptyline 25 MG Oral Cap TAKE 1 CAPSULE BY MOUTH EVERY NIGHT AT BEDTIME. CAN INCREASE TO 2 CAPSULE BY MOUTH EVERY NIGHT AT BEDTIME AS NEEDED. DISCONTINUE AMITRIPTYLINE. (Patient not taking: Reported on 9/19/2024)      methylPREDNISolone (MEDROL) 4 MG Oral Tablet Therapy Pack As directed. (Patient not taking: Reported on 9/16/2024) 1 each 0    amitriptyline 25 MG Oral Tab Take 2 tablets (50 mg total) by mouth nightly. (Patient not taking: Reported on 12/2/2024) 180 tablet 3    methylPREDNISolone (MEDROL) 4 MG Oral Tablet Therapy Pack As directed. (Patient not taking: Reported on 9/16/2024) 21 each 0    lidocaine 5 % External Ointment Apply 1 Application topically as needed. (Patient not taking: Reported on 12/2/2024) 240 g 0    Misc. Devices (WALKER) Does not apply Misc Use as discussed. (Patient not taking: Reported on 12/2/2024) 1 each 0     Allergies:Allergies[1]    Past Medical History:    Back pain    Essential hypertension      No past surgical history on file.   No family history on file.   Social History:   Social History     Socioeconomic History    Marital status:    Tobacco Use    Smoking status: Never    Smokeless tobacco: Never   Vaping Use    Vaping status: Never Used   Substance and Sexual Activity    Alcohol use: Never    Drug use: Never   Other Topics Concern    Caffeine Concern Yes     Comment: coffee and tea     Exercise Yes     Comment: PT    Breast feeding No    Reaction to local anesthetic No    Pt has a pacemaker No    Pt has a defibrillator No   Social History Narrative    The patient uses the following assistive device(s):  single-point cane.      The patient does live in a home with stairs.     Social Drivers of Health      [facility]    Social Connections    [facility]    Housing Stability        Peg Garcia's SCREENING SCHEDULE   Tests on this list are recommended by your physician but may not be covered, or covered at this frequency, by your insurer. Please check with your insurance carrier before scheduling to verify coverage.   PREVENTATIVE SERVICES  INDICATIONS AND SCHEDULE Internal Lab or Procedure External Lab or Procedure   Diabetes Screening      HbgA1C   Annually No results found for: \"A1C\"      No data to display                Fasting Blood Sugar (FSB)Annually Glucose (mg/dL)   Date Value   12/02/2024 94         No data to display               Cardiovascular Disease Screening     LDL Annually LDL Cholesterol (mg/dL)   Date Value   12/02/2024 110 (H)        EKG One Time      Colorectal Cancer Screening      Colonoscopy Screen every 10 years Health Maintenance   Topic Date Due    Colorectal Cancer Screening  06/28/2029    Update Health Maintenance if applicable    Flex Sigmoidoscopy Screen every 5 years No results found for this or any previous visit.      No data to display                 Fecal Occult Blood Annually No results found for: \"FOB\", \"OCCULTSTOOL\"      No data to display                Glaucoma Screening      Ophthalmology Visit Annually      Bone Density Screening      Dexascan Every two years Last Dexa Scan:    XR DEXA BONE DENSITOMETRY (CPT=77080) 05/18/2023        No data to display               Pap and Pelvic      Pap: Every 3 yrs age 21-65 or  Pap+HPV every 5 yrs age 30-65, age 65 and older at high risk   No recommendations at this time Update Health Maintenance if applicable    Chlamydia  Annually if high risk No results found for: \"CHLAMYDIA\"      No data to display                Screening Mammogram      Mammogram  Annually Health Maintenance   Topic Date Due    Mammogram  10/16/2025    Update Health Maintenance if applicable   Immunizations      Influenza No orders found for this or any previous visit. Update Immunization Activity if applicable    Pneumococcal No orders found for this or any previous visit. Update Immunization Activity if applicable    Hepatitis B No orders found for this or any previous visit. Update Immunization Activity if applicable    Tetanus No orders found for this or any previous visit. Update Immunization Activity if applicable    Zoster (Not covered by Medicare Part B) No orders found for this or any previous visit. Update Immunization Activity if applicable     SPECIFIC DISEASE MONITORING Internal Lab or Procedure External Lab or Procedure   Annual Monitoring of Persistent     Medications (ACE/ARB, digoxin, diuretics)    Potassium  Annually Potassium (mmol/L)   Date Value   12/02/2024 3.5         No data to display                Creatinine  Annually Creatinine (mg/dL)   Date Value   12/02/2024 1.11 (H)         No data to display                Digoxin Serum Conc  Annually No results found for: \"DIGOXIN\"      No data to display                Diabetes      HgbA1C  Annually No results found for: \"A1C\"      No data to display                Creat/alb ratio  Annually      LDL  Annually LDL Cholesterol (mg/dL)   Date Value   12/02/2024 110 (H)         No data to display                 Dilated Eye exam  Annually      No data to display                   No data to display                COPD      Spirometry Testing Annually No results found for this or any previous visit.      No data to display                      General  Health     In the past six months, have you lost more than 10 pounds without trying?: 2 - No    Has your appetite been poor?: No    Type of Diet: Balanced    How does the patient maintain a good energy level?: Daily Walks    How would you describe your daily physical activity?: Light    How would you describe your current health state?: Fair    How do you maintain positive mental well-being?: Visiting Friends         Have you had any immunizations at another office such as Influenza, Hepatitis B, Tetanus, or Pneumococcal?: No     Functional Ability     Bathing or Showering: Able without help    Toileting: Able without help    Dressing: Able without help    Eating: Able without help    Driving: Need some help    Preparing your meals: Able without help    Managing money/bills: Able without help    Taking medications as prescribed: Able without help    Are you able to afford your medications?: Yes    Hearing Problems?: No     Functional Status     Hearing Problems?: No    Vision Problems? : Yes    Difficulty walking?: Yes    Difficulty dressing or bathing?: Yes    Problems with daily activities? : Yes    Memory Problems?: Yes      Fall/Risk Assessment                                                              Depression Screening (PHQ-2/PHQ-9): Over the LAST 2 WEEKS                      Advance Directives     Do you have a healthcare power of ?: No    Do you have a living will?: No     Hearing Assessment (Required for AWV/SWV)      Hearing Screening    Screening Method: Questionnaire  I have a problem hearing over the telephone: No I have trouble following the conversations when two or more people are talking at the same time: No   I have trouble understanding things on the TV: No I have to strain to understand conversations: No   I have to worry about missing the telephone ring or doorbell: No I have trouble hearing conversations in a noisy background such as a crowded room or restaurant: No   I get confused  about where sounds come from: No I misunderstand some words in a sentence and need to ask people to repeat themselves: No   I especially have trouble understanding the speech of women and children: No I have trouble understanding the speaker in a large room such as at a meeting or place of [employer]: No   Many people I talk to seem to mumble (or don't speak clearly): No People get annoyed because I misunderstand what they say: No   I misunderstand what others are saying and make inappropriate responses: No I avoid social activities because I cannot hear well and fear I will reply improperly: No   Family members and friends have told me they think I may have hearing loss: No             Visual Acuity     Right Eye Visual Acuity: Uncorrected Left Eye Visual Acuity: Uncorrected   Right Eye Chart Acuity: 20/100 Left Eye Chart Acuity: 20/80     Cognitive Assessment     What day of the week is this?: Correct    What month is it?: Correct    What year is it?: Correct    Recall \"Ball\": Correct    Recall \"Flag\": Correct    Recall \"Tree\": Correct          Objective:   Vitals:    12/02/24 1415   BP: 120/70   Pulse:    Resp:    Temp:        Physical Exam  Constitutional:       Appearance: She is not ill-appearing.   HENT:      Head: Atraumatic.      Right Ear: Tympanic membrane normal.      Left Ear: Tympanic membrane normal.      Nose: Nose normal.      Mouth/Throat:      Mouth: Mucous membranes are moist.   Cardiovascular:      Rate and Rhythm: Normal rate.   Pulmonary:      Breath sounds: Normal breath sounds.   Abdominal:      General: Bowel sounds are normal.      Palpations: Abdomen is soft.      Comments: No pulsatile masses.  No abdominal bruit.   Neurological:      Mental Status: She is alert and oriented to person, place, and time.         Assessment & Plan:   1. Medicare annual wellness visit, subsequent  Survey has been completed.  The following labs have been ordered.  - CBC W Differential W Platelet [E]; Future  -  Comp Metabolic Panel (14) [E]; Future  - Lipid Panel [E]; Future  - TSH [E]; Future  - Urinalysis, Routine [E]; Future    2. Essential hypertension  Blood pressure measures to goal.  Medication compliance emphasized and encouraged.  Minimize salt intake.  Exercise safely 150 minutes/week if possible.    3. Cervical stenosis of spinal canal  Status unchanged.  Intermittent discomfort which intermittent pain management is provided.    4. Spinal stenosis at L4-L5 level  Patient sees physical medicine and pain management medication provided as needed.    5. Foraminal stenosis of cervical region  See #3.  Same as 3.    6. Herpetic dermatitis  Secondary to exposure from pedicure shop.  Rash has resolved, however sensory component still active.    7. Need for zoster vaccination  Ordered and sent to the pharmacy.  - Zoster Recombinant Adjuvanted (Shingrix -Shingles) [00105]      No orders of the defined types were placed in this encounter.      Meds This Visit:  Requested Prescriptions      No prescriptions requested or ordered in this encounter       Imaging & Referrals:  ZOSTER VACC RECOMBINANT IM NJX     Patient Instructions   All adult screening ordered and done appropriate for patient's age and gender and risk factors and complaints.  Medication reviewed and renewed where needed and appropriate.  Comply with medications.  Monitor blood pressures and record at home. Limit salt intake.  Encouraged physical fitness and daily physical activity daily.  Recommend weight loss via daily exercising and consistent healthy dietary changes.    Return in about 1 year (around 12/2/2025), or if symptoms worsen or fail to improve.         [1] No Known Allergies

## 2024-12-02 NOTE — PATIENT INSTRUCTIONS
All adult screening ordered and done appropriate for patient's age and gender and risk factors and complaints.  Medication reviewed and renewed where needed and appropriate.  Comply with medications.  Monitor blood pressures and record at home. Limit salt intake.  Encouraged physical fitness and daily physical activity daily.  Recommend weight loss via daily exercising and consistent healthy dietary changes.

## 2025-01-08 ENCOUNTER — NURSE TRIAGE (OUTPATIENT)
Dept: FAMILY MEDICINE CLINIC | Facility: CLINIC | Age: 70
End: 2025-01-08

## 2025-01-08 ENCOUNTER — HOSPITAL ENCOUNTER (OUTPATIENT)
Age: 70
Discharge: HOME | End: 2025-01-08
Payer: COMMERCIAL

## 2025-01-08 VITALS
SYSTOLIC BLOOD PRESSURE: 139 MMHG | OXYGEN SATURATION: 100 % | HEART RATE: 77 BPM | DIASTOLIC BLOOD PRESSURE: 70 MMHG | RESPIRATION RATE: 20 BRPM | TEMPERATURE: 99 F

## 2025-01-08 DIAGNOSIS — M79.2 NERVE PAIN: Primary | ICD-10-CM

## 2025-01-08 DIAGNOSIS — B02.9 HERPES ZOSTER WITHOUT COMPLICATION: ICD-10-CM

## 2025-01-08 RX ORDER — LIDOCAINE 50 MG/G
PATCH TOPICAL
Qty: 30 EACH | Refills: 0 | Status: SHIPPED | OUTPATIENT
Start: 2025-01-08

## 2025-01-08 RX ORDER — VALACYCLOVIR HYDROCHLORIDE 1 G/1
1000 TABLET, FILM COATED ORAL 3 TIMES DAILY
Qty: 21 TABLET | Refills: 0 | Status: SHIPPED | OUTPATIENT
Start: 2025-01-08 | End: 2025-01-15

## 2025-01-08 NOTE — TELEPHONE ENCOUNTER
If you can find any available slot for 1 day in the calendar week of January 13, 2025, then you can put the patient in.

## 2025-01-08 NOTE — TELEPHONE ENCOUNTER
Action Requested: Summary for Provider     []  Critical Lab, Recommendations Needed  [] Need Additional Advice  [x]   FYI    []   Need Orders  [] Need Medications Sent to Pharmacy  []  Other     SUMMARY: Patient reports sores on her legs, improving, but still present.  Patient also reports pain starting from the leg up to left shoulder and arm, jerking pain. Patient unable to raise arm over her head, states very painful. Patient states son noticed mild swelling of the arm. No rash noted. Reports numbness and tingling.  Patient states Tylenol does not help with the pain.  Advised evaluation. No more appointments available.  Advised Immediate care. Patient agreed and will go today.    Reason for call: Shoulder Pain (Left shoulder, jerking pain, arm)  Onset: Data Unavailable                       Reason for Disposition   Numbness (i.e., loss of sensation) in hand or fingers    Protocols used: Arm Pain-A-OH

## 2025-01-08 NOTE — TELEPHONE ENCOUNTER
Left message to call back.    (At time of call there is one appointment 1/16/2025 at 4:20 res24).

## 2025-01-09 NOTE — TELEPHONE ENCOUNTER
Left message to call back.    Contact Information  [phone] (Mobile)   [phone] (Home Phone)    Alternate    +1 more     Vazquez Vann (Spouse)  [phone] (Mobile)     Sending [employer] to call us back as well

## 2025-01-09 NOTE — ED INITIAL ASSESSMENT (HPI)
Pt here with complaints of left lower leg pain and left lower arm pain , pt states arm pain began 6 days ago pt states she fees numbness and tingling going up her left arm, pt states left lower leg is very tender to touch and movement, pt denies any injury

## 2025-01-09 NOTE — DISCHARGE INSTRUCTIONS
Sling for the next few days until your symptoms get better.  As we discussed with your nerve pain I am treating you for shingles.  Please call Dr. Chavez's office tomorrow for follow-up appointment.  Lidocaine patches sent to the pharmacy.  Follow the directions on the box.  Make sure that you change them out every 24 hours.  Try to avoid heat if possible.  If this is shingles it will make the rash worse.  Go to the ER for any new or worsening symptoms

## 2025-01-10 NOTE — TELEPHONE ENCOUNTER
Patient has read [facility] message.   No need to mail the letter .       Call to office for symptoms follow up  Message [phone]  From  Cheli De RN To  Peg Garcia Sent and Delivered  1/9/2025 10:20 AM   Last Read in [employer]  1/9/2025  9:01 PM by Peg Garcia

## 2025-02-05 ENCOUNTER — OFFICE VISIT (OUTPATIENT)
Dept: NEUROLOGY | Facility: CLINIC | Age: 70
End: 2025-02-05
Payer: COMMERCIAL

## 2025-02-05 ENCOUNTER — MED REC SCAN ONLY (OUTPATIENT)
Dept: NEUROLOGY | Facility: CLINIC | Age: 70
End: 2025-02-05

## 2025-02-05 DIAGNOSIS — G43.709 CHRONIC MIGRAINE W/O AURA W/O STATUS MIGRAINOSUS, NOT INTRACTABLE: Primary | ICD-10-CM

## 2025-02-05 PROBLEM — N18.30 CKD (CHRONIC KIDNEY DISEASE) STAGE 3, GFR 30-59 ML/MIN (HCC): Chronic | Status: ACTIVE | Noted: 2025-02-05

## 2025-02-05 PROCEDURE — 1160F RVW MEDS BY RX/DR IN RCRD: CPT | Performed by: OTHER

## 2025-02-05 PROCEDURE — 1159F MED LIST DOCD IN RCRD: CPT | Performed by: OTHER

## 2025-02-05 PROCEDURE — 64615 CHEMODENERV MUSC MIGRAINE: CPT | Performed by: OTHER

## 2025-02-19 ENCOUNTER — OFFICE VISIT (OUTPATIENT)
Dept: FAMILY MEDICINE CLINIC | Facility: CLINIC | Age: 70
End: 2025-02-19

## 2025-02-19 VITALS
SYSTOLIC BLOOD PRESSURE: 138 MMHG | TEMPERATURE: 97 F | HEIGHT: 65 IN | DIASTOLIC BLOOD PRESSURE: 80 MMHG | WEIGHT: 195 LBS | BODY MASS INDEX: 32.49 KG/M2 | OXYGEN SATURATION: 98 % | HEART RATE: 67 BPM

## 2025-02-19 DIAGNOSIS — M50.30 DEGENERATIVE DISC DISEASE, CERVICAL: ICD-10-CM

## 2025-02-19 DIAGNOSIS — M51.369 DEGENERATION OF INTERVERTEBRAL DISC OF LUMBAR REGION, UNSPECIFIED WHETHER PAIN PRESENT: ICD-10-CM

## 2025-02-19 DIAGNOSIS — M54.16 LUMBAR RADICULOPATHY: ICD-10-CM

## 2025-02-19 DIAGNOSIS — I10 ESSENTIAL HYPERTENSION: ICD-10-CM

## 2025-02-19 DIAGNOSIS — B02.9 HERPES ZOSTER WITHOUT COMPLICATION: ICD-10-CM

## 2025-02-19 DIAGNOSIS — L30.9 DERMATITIS: ICD-10-CM

## 2025-02-19 DIAGNOSIS — Z09 FOLLOW-UP EXAM: Primary | ICD-10-CM

## 2025-02-19 PROCEDURE — 1160F RVW MEDS BY RX/DR IN RCRD: CPT

## 2025-02-19 PROCEDURE — 1125F AMNT PAIN NOTED PAIN PRSNT: CPT

## 2025-02-19 PROCEDURE — 1159F MED LIST DOCD IN RCRD: CPT

## 2025-02-19 PROCEDURE — 3075F SYST BP GE 130 - 139MM HG: CPT

## 2025-02-19 PROCEDURE — 99214 OFFICE O/P EST MOD 30 MIN: CPT

## 2025-02-19 PROCEDURE — 3008F BODY MASS INDEX DOCD: CPT

## 2025-02-19 PROCEDURE — 1170F FXNL STATUS ASSESSED: CPT

## 2025-02-19 PROCEDURE — 3079F DIAST BP 80-89 MM HG: CPT

## 2025-02-19 RX ORDER — TRIAMCINOLONE ACETONIDE 1 MG/G
1 CREAM TOPICAL 2 TIMES DAILY PRN
Qty: 60 G | Refills: 3 | Status: SHIPPED | OUTPATIENT
Start: 2025-02-19

## 2025-02-19 NOTE — PROGRESS NOTES
Peg Garcia is a 70 year old female.  Chief Complaint   Patient presents with    Urgent Care F/u     Follow up urgent care visit 1/8/25 , left leg blisters after doing a pedicure, Pt still having pain and itchiness .     HPI:   Peg Garcia presented to the clinic for follow up UC visit 1/8/25 for rash to the left lower leg and left upper arm. Patient stated associated burning, pain, and itching. Patient diagnosed with shingles. Prescribed valtrex and lidocaine patch. Patient completed medication as prescribed.     Today, patient states rash has been present since October. Patient states rash started as blisters and opened on its own after a pedicure. Patient had seen dermatology for the rash/blisters in October. Was advised Vaseline. C/o continued itching and pain to the left leg near rash.     Additional concern related to pain to the left lower extremity. Associated weakness. Chronic back pain. CT cervical and lumbar spine 2023 - DDD. CSI injection to the spine - Dr. Shahid.   States none in several years. No recent PT.     Current Outpatient Medications   Medication Sig Dispense Refill    triamcinolone 0.1 % External Cream Apply 1 Application topically 2 (two) times daily as needed. 60 g 3    lidocaine 5 % External Patch Apply 1 patch to the left arm, and 1 patch to the left lower extremity every 24 hours for the next 10 to 14 days, then use as needed thereafter. 30 each 0    hydroCHLOROthiazide 12.5 MG Oral Tab Take 1 tablet (12.5 mg total) by mouth daily. 90 tablet 3    acetaminophen 500 MG Oral Tab Take 1 tablet (500 mg total) by mouth every 6 (six) hours as needed for Pain. 50 tablet 0    tiZANidine HCl 4 MG Oral Cap       amitriptyline 25 MG Oral Tab Take 2 tablets (50 mg total) by mouth nightly. 180 tablet 3    diazePAM 2 MG Oral Tab Take 1 tablet (2 mg total) by mouth 3 (three) times daily as needed (muscle spasm). 12 tablet 0    pregabalin (LYRICA) 150 MG Oral Cap Take 1 capsule (150 mg total) by  mouth 2 (two) times daily. 60 capsule 0    levocetirizine 5 MG Oral Tab Take 1 tablet (5 mg total) by mouth every evening. 30 tablet 1      Past Medical History:    Back pain    Essential hypertension      History reviewed. No pertinent surgical history.   Social History:  Social History     Socioeconomic History    Marital status:    Tobacco Use    Smoking status: Never    Smokeless tobacco: Never   Vaping Use    Vaping status: Never Used   Substance and Sexual Activity    Alcohol use: Never    Drug use: Never   Other Topics Concern    Caffeine Concern Yes     Comment: coffee and tea    Exercise Yes     Comment: PT    Breast feeding No    Reaction to local anesthetic No    Pt has a pacemaker No    Pt has a defibrillator No   Social History Narrative    The patient uses the following assistive device(s):  single-point cane.      The patient does live in a home with stairs.     Social Drivers of Health      [facility], [facility]    Housing Stability      History reviewed. No pertinent family history.   Allergies[1]     REVIEW OF SYSTEMS:   Review of Systems   Constitutional:  Negative for activity change.   Respiratory:  Negative for chest tightness and shortness of breath.    Cardiovascular:  Negative for chest pain and palpitations.   Musculoskeletal:  Positive for back pain.   Skin:  Positive for rash.   Neurological:  Positive for weakness (left).   Psychiatric/Behavioral: Negative.     All other systems reviewed and are negative.     Wt Readings from Last 5 Encounters:   02/19/25 195 lb (88.5 kg)   12/02/24 197 lb (89.4 kg)   09/19/24 205 lb 3.2 oz (93.1 kg)   09/16/24 190 lb (86.2 kg)   03/05/24 222 lb (100.7 kg)     Body mass index is 32.45 kg/m².      EXAM:   /74 (BP Location: Right arm, Patient Position: Sitting, Cuff Size: adult)   Pulse 67   Temp 97.2 °F (36.2 °C) (Temporal)   Ht 5' 5\" (1.651 m)   Wt 195 lb (88.5 kg)   SpO2 98%   BMI  32.45 kg/m²   Physical Exam  Vitals reviewed.   Constitutional:       Appearance: Normal appearance.   HENT:      Head: Normocephalic and atraumatic.   Pulmonary:      Effort: Pulmonary effort is normal.   Skin:     Comments: numerous healing erosion to the left lower extremity.    Neurological:      General: No focal deficit present.      Mental Status: She is alert and oriented to person, place, and time.   Psychiatric:         Mood and Affect: Mood normal.         Behavior: Behavior normal.            ASSESSMENT AND PLAN:   (Z09) Follow-up exam  (primary encounter diagnosis)  (B02.9) Herpes zoster without complication  Rash   Plan: reviewed UC note. Was diagnosed shingles. Patient prescribed valtrex and lidocaine patch. Completed as prescribed. Continued rash and pain to the left lower extremity. Patient states rash started as blisters in October. Had seen dermatology at that time. Based on presentation today and length of rash, unlikely shingles. Advised can repeat 2 weeks of triamcinolone cream. Continue Vaseline. Use gentle soaps and lotions. If no improvement in 2 weeks will follow up with dermatology.     (I10) Essential hypertension  Plan: well controlled. Continue current management.     (M51.369) Degeneration of intervertebral disc of lumbar region, unspecified whether pain present  (M50.30) Degenerative disc disease, cervical  (M54.16) Lumbar radiculopathy  Plan: Physical Therapy Referral - [facility],        Physiatry Referral - In Network    Patient with degenerative disc disease to the cervical and lumbar spine. CT cervical/lumbar completed last 2023. Patient with history of CSI. Last 2023. C/o weakness/numbness to the left lower extremity. Low back pain. Denies saddle paresthesia.  Likely related radiculopathy. Advised to continue lyrica and amitriptyline as prescribed. Recommend follow up with Dr. Shahid, referral placed. Recommend PT. Referral placed.     Follow up as needed     The patient  indicates understanding of these issues and agrees to the plan.  Chaperone offered to the patient prior to examination    This note was prepared using Dragon Medical voice recognition dictation software. As a result errors may occur. When identified these errors have been corrected. While every attempt is made to correct errors during dictation discrepancies may still exist.       [1] No Known Allergies

## 2025-03-04 ENCOUNTER — TELEPHONE (OUTPATIENT)
Dept: PHYSICAL MEDICINE AND REHAB | Facility: CLINIC | Age: 70
End: 2025-03-04

## 2025-03-04 NOTE — TELEPHONE ENCOUNTER
Insurance verified as active at this time.    Insurance: [employer] Member ID# [id number]   Medication: Botox 200 units  Number of visits Approved: 5  (PLEASE INFORM THE PATIENT TO CONTACT THE OFFICE IF THE INSURANCE CHANGES WITHIN THE YEAR AS HE/SHE WILL BE RESPONSIBLE TO UPDATE THE OFFICE IF INSURANCE CHANGES SO THAT PROCEDURE IS BILLED CORRECTLY) this will be 3 of 5  Dx: G43.709  Last Botox done: 2/5/25  Next Botox due around: 5/13/25 - Scheduled   Authorization # [id number]  Valid 10/17/24-10/17/25  [employer]  Authorization is not a guarantee of payment and may be subject to review once claim is submitted.

## 2025-03-10 NOTE — TELEPHONE ENCOUNTER
Patient is requesting refill for     lidocaine 5 % External Patch         [id number] - [place], [place] - [facility] [address] AT [facility] [address], [phone], [phone]

## 2025-03-13 RX ORDER — LIDOCAINE 50 MG/G
PATCH TOPICAL
Qty: 30 EACH | Refills: 0 | Status: SHIPPED | OUTPATIENT
Start: 2025-03-13

## 2025-03-31 ENCOUNTER — NURSE TRIAGE (OUTPATIENT)
Dept: FAMILY MEDICINE CLINIC | Facility: CLINIC | Age: 70
End: 2025-03-31

## 2025-03-31 NOTE — TELEPHONE ENCOUNTER
Recommend UC for evaluation of eye pain.    For the leg pain, patient has been referred to specialist. I recommend she follow up with Dr. Shahid as referred to discuss additional treatment.

## 2025-03-31 NOTE — TELEPHONE ENCOUNTER
Action Requested: Summary for Provider     []  Critical Lab, Recommendations Needed  [] Need Additional Advice  [x]   FYI    []   Need Orders   Need Medications Sent to Pharmacy  []  Other[]     SUMMARY:   Spoke with patient, Date of Birth verified  She stated she woke up today with left eye swollen and pain with rate 8/10, has burning sensation.  She denies redness, eye discharge, injury, no new product used.  Also still has ongoing left leg pain/ swelling but PCP is aware of this, her left side of body is sore.   Patient supposed to see Lilian MENDEZ last week but was it cx'd as Provider is not available.   She is looking for appt.   She was informed no available appt for today, she was offered an appt for tomorrow at [facility] but declined.    Pt was advised to continue monitor her sx, if sx persist or gets worse to go to ER/ IC, she agreed and stated understanding.   She will go to IC OP.   Last office visit  2-19-25 with Lilian MENDEZ for leg pain.        Reason for call: eye swollen  Onset: Today             Reason for Disposition   Patient wants to be seen    Protocols used: Eye - Swelling-A-OH    Future Appointments   Date Time Provider Department Center   4/7/2025  2:00 PM Lilian Maldonado LCSW [facility] [place]   5/13/2025  1:45 PM Marshall Bueno MD [facility] [place] [facility]

## 2025-04-07 ENCOUNTER — NURSE TRIAGE (OUTPATIENT)
Dept: FAMILY MEDICINE CLINIC | Facility: CLINIC | Age: 70
End: 2025-04-07

## 2025-04-07 NOTE — TELEPHONE ENCOUNTER
Action Requested: Summary for Provider     []  Critical Lab, Recommendations Needed  [] Need Additional Advice  [x]   FYI    []   Need Orders  [] Need Medications Sent to Pharmacy  []  Other     SUMMARY: Per protocol advised :  Go to ER   Will go to [facility] now       Reason for call: Chest Pain  Onset: Data Unavailable    Patient calling ( name and date of birth of patient verified ) has been having headaches,  intermittent  \"striking 'and sharp  left sided chest pain , and left leg pain   Also mentioned she has left sided jaw pain , was worse last week, jaw better is better and the swelling went down   She currently has the jaw pain but not as bad   Patient states it is hard to breathe at times and lasts for awhile     Has hx of HTN     Patient advised to go to ER to be evaluated     Patient agrees to go to [facility]   Reason for Disposition   Pain also in shoulder(s) or arm(s) or jaw    Protocols used: Chest Pain-A-OH

## 2025-04-08 NOTE — TELEPHONE ENCOUNTER
Mailbox full     RN please try calling patient again - no Emergency Room visit, and reported chest pains yesterday 4/7/25.

## 2025-04-10 ENCOUNTER — TELEPHONE (OUTPATIENT)
Dept: FAMILY MEDICINE CLINIC | Facility: CLINIC | Age: 70
End: 2025-04-10

## 2025-04-10 NOTE — TELEPHONE ENCOUNTER
Tried calling patient no answer left her a message.    I have also sent patient a [employer] message to call us back.

## 2025-04-23 ENCOUNTER — OFFICE VISIT (OUTPATIENT)
Dept: FAMILY MEDICINE CLINIC | Facility: CLINIC | Age: 70
End: 2025-04-23

## 2025-04-23 VITALS
OXYGEN SATURATION: 99 % | HEART RATE: 76 BPM | HEIGHT: 65 IN | WEIGHT: 202 LBS | SYSTOLIC BLOOD PRESSURE: 134 MMHG | DIASTOLIC BLOOD PRESSURE: 70 MMHG | TEMPERATURE: 98 F | BODY MASS INDEX: 33.66 KG/M2

## 2025-04-23 DIAGNOSIS — I10 ESSENTIAL HYPERTENSION: ICD-10-CM

## 2025-04-23 DIAGNOSIS — R07.9 CHEST PAIN, UNSPECIFIED TYPE: Primary | ICD-10-CM

## 2025-04-23 DIAGNOSIS — R94.31 ABNORMAL EKG: ICD-10-CM

## 2025-04-23 PROBLEM — M79.10 MYALGIA: Status: RESOLVED | Noted: 2019-11-14 | Resolved: 2025-04-23

## 2025-04-23 PROBLEM — E87.6 HYPOPOTASSEMIA: Status: RESOLVED | Noted: 2021-02-22 | Resolved: 2025-04-23

## 2025-04-23 PROBLEM — R29.3 POOR POSTURE: Status: RESOLVED | Noted: 2019-11-14 | Resolved: 2025-04-23

## 2025-04-23 PROBLEM — S16.1XXA STRAIN OF NECK MUSCLE, INITIAL ENCOUNTER: Status: RESOLVED | Noted: 2021-12-30 | Resolved: 2025-04-23

## 2025-04-23 PROBLEM — M54.41 ACUTE BILATERAL LOW BACK PAIN WITH BILATERAL SCIATICA: Status: RESOLVED | Noted: 2019-11-14 | Resolved: 2025-04-23

## 2025-04-23 PROBLEM — M70.62 GREATER TROCHANTERIC BURSITIS OF LEFT HIP: Status: RESOLVED | Noted: 2020-09-24 | Resolved: 2025-04-23

## 2025-04-23 PROBLEM — M25.561 ACUTE PAIN OF RIGHT KNEE: Status: RESOLVED | Noted: 2021-12-29 | Resolved: 2025-04-23

## 2025-04-23 PROBLEM — M25.551 PAIN OF BOTH HIP JOINTS: Status: RESOLVED | Noted: 2019-11-14 | Resolved: 2025-04-23
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
## 2025-04-23 PROBLEM — M25.552 PAIN OF BOTH HIP JOINTS: Status: RESOLVED | Noted: 2019-11-14 | Resolved: 2025-04-23

## 2025-04-23 PROBLEM — M51.26 HNP (HERNIATED NUCLEUS PULPOSUS), LUMBAR: Status: RESOLVED | Noted: 2020-01-04 | Resolved: 2025-04-23

## 2025-04-23 PROBLEM — N83.201 RIGHT OVARIAN CYST: Status: RESOLVED | Noted: 2020-01-04 | Resolved: 2025-04-23

## 2025-04-23 PROBLEM — M47.816 FACET ARTHROPATHY, LUMBAR: Status: RESOLVED | Noted: 2020-06-22 | Resolved: 2025-04-23

## 2025-04-23 PROBLEM — M54.42 ACUTE BILATERAL LOW BACK PAIN WITH BILATERAL SCIATICA: Status: RESOLVED | Noted: 2019-11-14 | Resolved: 2025-04-23

## 2025-04-23 PROBLEM — R26.2 INABILITY TO AMBULATE DUE TO KNEE: Status: RESOLVED | Noted: 2021-12-30 | Resolved: 2025-04-23

## 2025-04-23 PROBLEM — G57.31 PERONEAL NEURITIS, RIGHT: Status: RESOLVED | Noted: 2022-08-17 | Resolved: 2025-04-23

## 2025-04-23 LAB
ATRIAL RATE: 74 BPM
P AXIS: 72 DEGREES
P-R INTERVAL: 158 MS
Q-T INTERVAL: 404 MS
QRS DURATION: 74 MS
QTC CALCULATION (BEZET): 448 MS
R AXIS: 10 DEGREES
T AXIS: 39 DEGREES
VENTRICULAR RATE: 74 BPM

## 2025-04-23 PROCEDURE — 1125F AMNT PAIN NOTED PAIN PRSNT: CPT

## 2025-04-23 PROCEDURE — 3075F SYST BP GE 130 - 139MM HG: CPT

## 2025-04-23 PROCEDURE — 93000 ELECTROCARDIOGRAM COMPLETE: CPT

## 2025-04-23 PROCEDURE — 3008F BODY MASS INDEX DOCD: CPT

## 2025-04-23 PROCEDURE — 1170F FXNL STATUS ASSESSED: CPT

## 2025-04-23 PROCEDURE — 3078F DIAST BP <80 MM HG: CPT

## 2025-04-23 PROCEDURE — 1159F MED LIST DOCD IN RCRD: CPT

## 2025-04-23 PROCEDURE — 99214 OFFICE O/P EST MOD 30 MIN: CPT

## 2025-04-23 NOTE — PROGRESS NOTES
Peg Garcia is a 70 year old female.  Chief Complaint   Patient presents with    Leg Pain     Left leg pain, goes up left hip for past 5 days    Chest Pain     Chest pain ( upper left breast area) and left arm pain ( getting worse for past week)     HPI:   Peg Garcia presented to the clinic for c/o of chest pain x 1 week.  States chest pain has worsened over the last week.  Sharp, shooting.  Radiates to the left upper extremity.  Weakness in the left arm.  Headache. Left sided jaw pain. Fatigue.  Shortness of breath at rest and exertionHX of HTN . Took all of her medications today. Drove her by herself.     Current Medications[1]   Past Medical History[2]   Past Surgical History[3]   Social History:  Short Social Hx on File[4]   Family History[5]   Allergies[6]     REVIEW OF SYSTEMS:   Review of Systems   Constitutional:  Negative for activity change.   Respiratory:  Positive for shortness of breath. Negative for chest tightness.    Cardiovascular:  Positive for chest pain. Negative for palpitations.   Musculoskeletal:         Left arm pain   Neurological:  Positive for headaches.   Psychiatric/Behavioral: Negative.     All other systems reviewed and are negative.     Wt Readings from Last 5 Encounters:   04/23/25 202 lb (91.6 kg)   02/19/25 195 lb (88.5 kg)   12/02/24 197 lb (89.4 kg)   09/19/24 205 lb 3.2 oz (93.1 kg)   09/16/24 190 lb (86.2 kg)     Body mass index is 33.61 kg/m².      EXAM:   /70 (BP Location: Right arm, Patient Position: Sitting, Cuff Size: adult)   Pulse 76   Temp 97.7 °F (36.5 °C) (Temporal)   Ht 5' 5\" (1.651 m)   Wt 202 lb (91.6 kg)   SpO2 99%   BMI 33.61 kg/m²   Physical Exam  Vitals reviewed.   Constitutional:       Appearance: Normal appearance.   HENT:      Head: Normocephalic and atraumatic.   Cardiovascular:      Rate and Rhythm: Normal rate and regular rhythm.      Pulses: Normal pulses.      Heart sounds: Normal heart sounds.   Pulmonary:      Effort: Pulmonary  effort is normal. No respiratory distress.      Breath sounds: Normal breath sounds.   Neurological:      General: No focal deficit present.      Mental Status: She is alert and oriented to person, place, and time.   Psychiatric:         Mood and Affect: Mood normal.         Behavior: Behavior normal.            ASSESSMENT AND PLAN:   (R07.9) Chest pain, unspecified type  (primary encounter diagnosis)  (R94.31) Abnormal EKG  (I10) Essential hypertension  Plan: patient alert x 4. Patient with complaints of chest pain x 1 week.  States chest pain has worsened over the last week.  Sharp, shooting.  Radiates to the left upper extremity.  Weakness in the left arm.  Headache. Left sided jaw pain. Fatigue.  Shortness of breath at rest and exertion.  EKG completed in office.  Demonstrated sinus rhythm with occasional PVC and nonspecific T wave.   Based on patient's symptoms and acute active shortness of breath 911 notified to bring to the emergency room for further evaluation.  Patient agreeable to plan.    Patient notified son to  her car, as she drove to the appointment.       Patient left via ambulance. Patient Alert x 4. Report given to EMS.       The patient indicates understanding of these issues and agrees to the plan.  Chaperone offered to the patient prior to examination    This note was prepared using Dragon Medical voice recognition dictation software. As a result errors may occur. When identified these errors have been corrected. While every attempt is made to correct errors during dictation discrepancies may still exist.         [1]   Current Outpatient Medications   Medication Sig Dispense Refill    triamcinolone 0.1 % External Cream Apply 1 Application topically 2 (two) times daily as needed. 60 g 3    hydroCHLOROthiazide 12.5 MG Oral Tab Take 1 tablet (12.5 mg total) by mouth daily. 90 tablet 3    acetaminophen 500 MG Oral Tab Take 1 tablet (500 mg total) by mouth every 6 (six) hours as needed for  Pain. 50 tablet 0    tiZANidine HCl 4 MG Oral Cap       amitriptyline 25 MG Oral Tab Take 2 tablets (50 mg total) by mouth nightly. 180 tablet 3    diazePAM 2 MG Oral Tab Take 1 tablet (2 mg total) by mouth 3 (three) times daily as needed (muscle spasm). 12 tablet 0    pregabalin (LYRICA) 150 MG Oral Cap Take 1 capsule (150 mg total) by mouth 2 (two) times daily. 60 capsule 0    levocetirizine 5 MG Oral Tab Take 1 tablet (5 mg total) by mouth every evening. 30 tablet 1    lidocaine 5 % External Patch Apply 1 patch to the left arm, and 1 patch to the left lower extremity every 24 hours for the next 10 to 14 days, then use as needed thereafter. (Patient not taking: Reported on 4/23/2025) 30 each 0   [2]   Past Medical History:   Back pain    Essential hypertension   [3] History reviewed. No pertinent surgical history.  [4]   Social History  Socioeconomic History    Marital status:    Tobacco Use    Smoking status: Never    Smokeless tobacco: Never   Vaping Use    Vaping status: Never Used   Substance and Sexual Activity    Alcohol use: Never    Drug use: Never   Other Topics Concern    Caffeine Concern Yes     Comment: coffee and tea    Exercise Yes     Comment: PT    Breast feeding No    Reaction to local anesthetic No    Pt has a pacemaker No    Pt has a defibrillator No   Social History Narrative    The patient uses the following assistive device(s):  single-point cane.      The patient does live in a home with stairs.     Social Drivers of Health      Received from [facility]    Housing Stability   [5] History reviewed. No pertinent family history.  [6] No Known Allergies

## 2025-05-13 ENCOUNTER — OFFICE VISIT (OUTPATIENT)
Dept: NEUROLOGY | Facility: CLINIC | Age: 70
End: 2025-05-13
Payer: MEDICARE

## 2025-05-13 ENCOUNTER — MED REC SCAN ONLY (OUTPATIENT)
Dept: NEUROLOGY | Facility: CLINIC | Age: 70
End: 2025-05-13

## 2025-05-13 DIAGNOSIS — G43.709 CHRONIC MIGRAINE W/O AURA W/O STATUS MIGRAINOSUS, NOT INTRACTABLE: Primary | ICD-10-CM

## 2025-05-13 PROCEDURE — 64615 CHEMODENERV MUSC MIGRAINE: CPT | Performed by: OTHER

## 2025-05-13 NOTE — PROGRESS NOTES
Paperwork noting that patient may bear financial responsibility for procedure(s) performed in clinic today signed prior to proceeding with procedure(s).    Furthermore, patient notified that they should contact their insurer to verify that your procedure/test has been approved and is a COVERED benefit.  Although the [facility] staff does its due diligence, the insurance carrier gives the disclaimer that \"Although the procedure is authorized, this does not guarantee payment.\"    Ultimately the patient is responsible for payment.    Botox is:  [x] Buy and Bill  [] Patient Supplied      Botox Reauthorization Questions:  Has the patient experienced a reduction in frequency of migraines since starting Botox? yes  If yes, by what percentage? 75%  Has the intensity of migraines decreased since starting Botox? yes  If yes, by what percentage? 75%    [x] I have discussed with patient that if their insurance changes they must contact the office right away with that information so that a new prior authorization can be completed.  Patient verbalized understanding that Botox cannot be performed without a current prior authorization in place with correct insurance.

## 2025-05-16 ENCOUNTER — PATIENT MESSAGE (OUTPATIENT)
Dept: FAMILY MEDICINE CLINIC | Facility: CLINIC | Age: 70
End: 2025-05-16

## 2025-05-19 ENCOUNTER — NURSE TRIAGE (OUTPATIENT)
Dept: FAMILY MEDICINE CLINIC | Facility: CLINIC | Age: 70
End: 2025-05-19

## 2025-05-19 NOTE — TELEPHONE ENCOUNTER
Action Requested: Summary for Provider     []  Critical Lab, Recommendations Needed  [] Need Additional Advice  []   FYI    []   Need Orders  [] Need Medications Sent to Pharmacy  []  Other     SUMMARY: Patient calling, ongoing leg pain left side. Reports she gets chest pains, also feels off balance but this has been going on for 3 months. Maybe worse lately although patient would not confirm this. Area on leg she \"feels something is under the skin\". Hurts more when she presses on the area. If goes up leg, to back and then to chest.   Patient reports she does get SOB, but is not SOB now.   Advised 911 and she declines this    She is reporting she wants to call her son who was just there with her to take her to ER.   I advised I would call her back, offered to call son while on the line with her and she declined that.       Reason for call: Leg Pain  Onset: for months      Reason for Disposition   Chest pain    Protocols used: Leg Pain-A-OH

## 2025-05-19 NOTE — TELEPHONE ENCOUNTER
I called the patient and she did reach her son and she is going to have him take her to ER. She does not want to go to ER [place], she wants [place] [facility].   Advised closest ER would be preferred, patient declines this and wants [place]    Please watch for ER visit.

## 2025-05-20 NOTE — TELEPHONE ENCOUNTER
Left message to call back for patient  Also called Son Trevor, left message on cell number for him to call us back     Umberto also being sent     No ER visit seen

## 2025-05-21 NOTE — TELEPHONE ENCOUNTER
Patient (name and  verified) called back and states that her symptoms are improving at this time and did not go to the ER. States that her leg pain and chest pain is better.

## 2025-05-29 ENCOUNTER — TELEPHONE (OUTPATIENT)
Dept: FAMILY MEDICINE CLINIC | Facility: CLINIC | Age: 70
End: 2025-05-29

## 2025-06-03 ENCOUNTER — APPOINTMENT (OUTPATIENT)
Dept: CT IMAGING | Facility: HOSPITAL | Age: 70
End: 2025-06-03
Attending: EMERGENCY MEDICINE
Payer: COMMERCIAL

## 2025-06-03 ENCOUNTER — HOSPITAL ENCOUNTER (OUTPATIENT)
Facility: HOSPITAL | Age: 70
Setting detail: OBSERVATION
LOS: 1 days | Discharge: HOME | End: 2025-06-07
Attending: EMERGENCY MEDICINE | Admitting: INTERNAL MEDICINE
Payer: COMMERCIAL

## 2025-06-03 DIAGNOSIS — S70.02XA CONTUSION OF LEFT HIP, INITIAL ENCOUNTER: ICD-10-CM

## 2025-06-03 DIAGNOSIS — E87.6 HYPOKALEMIA: ICD-10-CM
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
## 2025-06-03 DIAGNOSIS — S06.0XAA CONCUSSION WITH UNKNOWN LOSS OF CONSCIOUSNESS STATUS, INITIAL ENCOUNTER: ICD-10-CM

## 2025-06-03 DIAGNOSIS — R40.4 ALTERED LEVEL OF CONSCIOUSNESS: ICD-10-CM

## 2025-06-03 PROCEDURE — 73700 CT LOWER EXTREMITY W/O DYE: CPT | Performed by: EMERGENCY MEDICINE

## 2025-06-03 PROCEDURE — 70450 CT HEAD/BRAIN W/O DYE: CPT | Performed by: EMERGENCY MEDICINE

## 2025-06-03 RX ORDER — MORPHINE SULFATE 4 MG/ML
4 INJECTION, SOLUTION INTRAMUSCULAR; INTRAVENOUS ONCE
Status: DISCONTINUED | OUTPATIENT
Start: 2025-06-03 | End: 2025-06-04

## 2025-06-04 ENCOUNTER — APPOINTMENT (OUTPATIENT)
Dept: GENERAL RADIOLOGY | Facility: HOSPITAL | Age: 70
End: 2025-06-04
Attending: INTERNAL MEDICINE
Payer: COMMERCIAL

## 2025-06-04 ENCOUNTER — APPOINTMENT (OUTPATIENT)
Dept: CT IMAGING | Facility: HOSPITAL | Age: 70
End: 2025-06-04
Attending: FAMILY MEDICINE
Payer: COMMERCIAL

## 2025-06-04 PROBLEM — S70.02XA CONTUSION OF LEFT HIP, INITIAL ENCOUNTER: Status: ACTIVE | Noted: 2025-06-04

## 2025-06-04 PROBLEM — R40.4 ALTERED LEVEL OF CONSCIOUSNESS: Status: ACTIVE | Noted: 2025-06-04

## 2025-06-04 PROBLEM — E87.6 HYPOKALEMIA: Status: ACTIVE | Noted: 2025-06-04

## 2025-06-04 PROBLEM — S06.0XAA CONCUSSION WITH UNKNOWN LOSS OF CONSCIOUSNESS STATUS, INITIAL ENCOUNTER: Status: ACTIVE | Noted: 2025-06-04
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
## 2025-06-04 LAB
ALBUMIN SERPL-MCNC: 4.7 G/DL (ref 3.2–4.8)
ALP LIVER SERPL-CCNC: 115 U/L (ref 55–142)
ALT SERPL-CCNC: 20 U/L (ref 10–49)
ANION GAP SERPL CALC-SCNC: 8 MMOL/L (ref 0–18)
AST SERPL-CCNC: 25 U/L (ref ?–34)
BASE EXCESS BLD CALC-SCNC: 10.7 MMOL/L (ref ?–2)
BASOPHILS # BLD AUTO: 0.02 X10(3) UL (ref 0–0.2)
BASOPHILS NFR BLD AUTO: 0.3 %
BILIRUB DIRECT SERPL-MCNC: 0.1 MG/DL (ref ?–0.3)
BILIRUB SERPL-MCNC: 0.5 MG/DL (ref 0.2–1.1)
BILIRUB UR QL: NEGATIVE
BUN BLD-MCNC: 11 MG/DL (ref 9–23)
BUN/CREAT SERPL: 9.2 (ref 10–20)
CALCIUM BLD-MCNC: 9.7 MG/DL (ref 8.7–10.4)
CHLORIDE SERPL-SCNC: 101 MMOL/L (ref 98–112)
CLARITY UR: CLEAR
CO2 SERPL-SCNC: 32 MMOL/L (ref 21–32)
CREAT BLD-MCNC: 1.2 MG/DL (ref 0.55–1.02)
DEPRECATED RDW RBC AUTO: 44.2 FL (ref 35.1–46.3)
EGFRCR SERPLBLD CKD-EPI 2021: 49 ML/MIN/1.73M2 (ref 60–?)
EOSINOPHIL # BLD AUTO: 0.05 X10(3) UL (ref 0–0.7)
EOSINOPHIL NFR BLD AUTO: 0.9 %
ERYTHROCYTE [DISTWIDTH] IN BLOOD BY AUTOMATED COUNT: 12.7 % (ref 11–15)
ETHANOL SERPL-MCNC: <3 MG/DL (ref ?–3)
GLUCOSE BLD-MCNC: 99 MG/DL (ref 70–99)
GLUCOSE BLDC GLUCOMTR-MCNC: 70 MG/DL (ref 70–99)
GLUCOSE BLDC GLUCOMTR-MCNC: 84 MG/DL (ref 70–99)
GLUCOSE UR-MCNC: NORMAL MG/DL
HCO3 BLDA-SCNC: 33.2 MEQ/L (ref 21–27)
HCT VFR BLD AUTO: 35.5 % (ref 35–48)
HGB BLD-MCNC: 11.7 G/DL (ref 12–16)
HGB UR QL STRIP.AUTO: NEGATIVE
HYALINE CASTS #/AREA URNS AUTO: PRESENT /LPF
IMM GRANULOCYTES # BLD AUTO: 0.01 X10(3) UL (ref 0–1)
IMM GRANULOCYTES NFR BLD: 0.2 %
KETONES UR-MCNC: NEGATIVE MG/DL
LEUKOCYTE ESTERASE UR QL STRIP.AUTO: 25
LYMPHOCYTES # BLD AUTO: 2.44 X10(3) UL (ref 1–4)
LYMPHOCYTES NFR BLD AUTO: 42.7 %
MCH RBC QN AUTO: 31.4 PG (ref 26–34)
MCHC RBC AUTO-ENTMCNC: 33 G/DL (ref 31–37)
MCV RBC AUTO: 95.2 FL (ref 80–100)
MONOCYTES # BLD AUTO: 0.62 X10(3) UL (ref 0.1–1)
MONOCYTES NFR BLD AUTO: 10.8 %
NEUTROPHILS # BLD AUTO: 2.58 X10 (3) UL (ref 1.5–7.7)
NEUTROPHILS # BLD AUTO: 2.58 X10(3) UL (ref 1.5–7.7)
NEUTROPHILS NFR BLD AUTO: 45.1 %
NITRITE UR QL STRIP.AUTO: NEGATIVE
O2 CT BLD-SCNC: 15.4 VOL% (ref 15–23)
OSMOLALITY SERPL CALC.SUM OF ELEC: 291 MOSM/KG (ref 275–295)
PCO2 BLDA: 45 MM HG (ref 35–45)
PH BLDA: 7.5 [PH] (ref 7.35–7.45)
PH UR: 6.5 [PH] (ref 5–8)
PLATELET # BLD AUTO: 262 10(3)UL (ref 150–450)
PO2 BLDA: 78 MM HG (ref 80–100)
POTASSIUM SERPL-SCNC: 2.8 MMOL/L (ref 3.5–5.1)
POTASSIUM SERPL-SCNC: 3.8 MMOL/L (ref 3.5–5.1)
PROT SERPL-MCNC: 7.6 G/DL (ref 5.7–8.2)
PROT UR-MCNC: NEGATIVE MG/DL
PUNCTURE CHARGE: YES
RBC # BLD AUTO: 3.73 X10(6)UL (ref 3.8–5.3)
SAO2 % BLDA: 97.6 % (ref 94–100)
SODIUM SERPL-SCNC: 141 MMOL/L (ref 136–145)
SP GR UR STRIP: 1.02 (ref 1–1.03)
UROBILINOGEN UR STRIP-ACNC: NORMAL
WBC # BLD AUTO: 5.7 X10(3) UL (ref 4–11)

## 2025-06-04 PROCEDURE — 99291 CRITICAL CARE FIRST HOUR: CPT | Performed by: HOSPITALIST

## 2025-06-04 PROCEDURE — 70450 CT HEAD/BRAIN W/O DYE: CPT | Performed by: FAMILY MEDICINE

## 2025-06-04 PROCEDURE — 99223 1ST HOSP IP/OBS HIGH 75: CPT | Performed by: INTERNAL MEDICINE

## 2025-06-04 PROCEDURE — 73590 X-RAY EXAM OF LOWER LEG: CPT | Performed by: INTERNAL MEDICINE

## 2025-06-04 PROCEDURE — 73080 X-RAY EXAM OF ELBOW: CPT | Performed by: INTERNAL MEDICINE

## 2025-06-04 PROCEDURE — 99222 1ST HOSP IP/OBS MODERATE 55: CPT | Performed by: INTERNAL MEDICINE

## 2025-06-04 RX ORDER — ONDANSETRON 2 MG/ML
4 INJECTION INTRAMUSCULAR; INTRAVENOUS EVERY 6 HOURS PRN
Status: DISCONTINUED | OUTPATIENT
Start: 2025-06-04 | End: 2025-06-07

## 2025-06-04 RX ORDER — HYDROCODONE BITARTRATE AND ACETAMINOPHEN 5; 325 MG/1; MG/1
2 TABLET ORAL EVERY 4 HOURS PRN
Status: DISCONTINUED | OUTPATIENT
Start: 2025-06-04 | End: 2025-06-04

## 2025-06-04 RX ORDER — ACETAMINOPHEN 500 MG
500 TABLET ORAL EVERY 4 HOURS PRN
Status: DISCONTINUED | OUTPATIENT
Start: 2025-06-04 | End: 2025-06-07

## 2025-06-04 RX ORDER — PREGABALIN 75 MG/1
150 CAPSULE ORAL 2 TIMES DAILY
Status: DISCONTINUED | OUTPATIENT
Start: 2025-06-04 | End: 2025-06-04

## 2025-06-04 RX ORDER — DIAZEPAM 2 MG/1
2 TABLET ORAL 3 TIMES DAILY PRN
Status: DISCONTINUED | OUTPATIENT
Start: 2025-06-04 | End: 2025-06-04

## 2025-06-04 RX ORDER — CETIRIZINE HYDROCHLORIDE 5 MG/1
5 TABLET ORAL DAILY
Status: DISCONTINUED | OUTPATIENT
Start: 2025-06-04 | End: 2025-06-07

## 2025-06-04 RX ORDER — ACETAMINOPHEN 325 MG/1
650 TABLET ORAL EVERY 4 HOURS PRN
Status: DISCONTINUED | OUTPATIENT
Start: 2025-06-04 | End: 2025-06-07

## 2025-06-04 RX ORDER — NALOXONE HYDROCHLORIDE 0.4 MG/ML
0.4 INJECTION, SOLUTION INTRAMUSCULAR; INTRAVENOUS; SUBCUTANEOUS ONCE
Status: COMPLETED | OUTPATIENT
Start: 2025-06-04 | End: 2025-06-04

## 2025-06-04 RX ORDER — HYDROCHLOROTHIAZIDE 12.5 MG/1
12.5 TABLET ORAL DAILY
Status: DISCONTINUED | OUTPATIENT
Start: 2025-06-04 | End: 2025-06-07

## 2025-06-04 RX ORDER — LORAZEPAM 2 MG/ML
0.5 INJECTION INTRAMUSCULAR ONCE
Status: COMPLETED | OUTPATIENT
Start: 2025-06-04 | End: 2025-06-04

## 2025-06-04 RX ORDER — HYDROCODONE BITARTRATE AND ACETAMINOPHEN 5; 325 MG/1; MG/1
1 TABLET ORAL EVERY 4 HOURS PRN
Status: DISCONTINUED | OUTPATIENT
Start: 2025-06-04 | End: 2025-06-04

## 2025-06-04 RX ORDER — HEPARIN SODIUM 5000 [USP'U]/ML
5000 INJECTION, SOLUTION INTRAVENOUS; SUBCUTANEOUS EVERY 8 HOURS SCHEDULED
Status: DISCONTINUED | OUTPATIENT
Start: 2025-06-04 | End: 2025-06-07

## 2025-06-04 NOTE — ED PROVIDER NOTES
Patient Seen in: [facility] Emergency Department        History  Chief Complaint   Patient presents with    Fall     Stated Complaint: fall    Subjective:   HPI            Patient is a 70-year-old female who presents with mechanical fall that occurred immediately prior to arrival.  Patient was walking on the sidewalk when she tripped over a metal grate and landed on her left side.  Positive LOC.  Positive headache and left hip pain.  Positive nausea with no vomiting.      Objective:     Past Medical History:    Back pain    Essential hypertension              History reviewed. No pertinent surgical history.             Social History     Socioeconomic History    Marital status:    Tobacco Use    Smoking status: Never    Smokeless tobacco: Never   Vaping Use    Vaping status: Never Used   Substance and Sexual Activity    Alcohol use: Never    Drug use: Never   Other Topics Concern    Caffeine Concern Yes     Comment: coffee and tea    Exercise Yes     Comment: PT    Breast feeding No    Reaction to local anesthetic No    Pt has a pacemaker No    Pt has a defibrillator No   Social History Narrative    The patient uses the following assistive device(s):  single-point cane.      The patient does live in a home with stairs.     Social Drivers of Health      Received from [facility]    Housing Stability                                Physical Exam    ED Triage Vitals [06/03/25 2329]   BP (!) 147/102   Pulse 71   Resp 22   Temp 97.8 °F (36.6 °C)   Temp src Oral   SpO2 99 %   O2 Device None (Room air)       Current Vitals:   Vital Signs  BP: 146/61  Pulse: 72  Resp: 15  Temp: 97.8 °F (36.6 °C)  Temp src: Oral  MAP (mmHg): 87    Oxygen Therapy  SpO2: 98 %  O2 Device: None (Room air)            Physical Exam  GENERAL: moderate distress, awake and alert  HEENT: EOMI, PERRL, no hematoma  Neck: supple  CV: RRR, no murmurs; distal pulses 2+  Resp: CTAB, no wheezes or retractions  Ab: soft,  nontender, no distension  Extremities: +ecchymosis left lateral hip with tenderness. No obvious deformity.   Back: TTP over left paralumbar area  Neuro: CN intact, normal speech, no focal deficits  SKIN: warm, dry          ED Course  Labs Reviewed   BASIC METABOLIC PANEL (8) - Abnormal; Notable for the following components:       Result Value    Potassium 2.8 (*)     Creatinine 1.20 (*)     BUN/CREA Ratio 9.2 (*)     eGFR-Cr 49 (*)     All other components within normal limits   CBC WITH DIFFERENTIAL WITH PLATELET - Abnormal; Notable for the following components:    RBC 3.73 (*)     HGB 11.7 (*)     All other components within normal limits   HEPATIC FUNCTION PANEL (7) - Normal   ETHYL ALCOHOL   URINALYSIS WITH CULTURE REFLEX                MDM   Admission disposition: 6/4/2025  1:42 AM           Medical Decision Making  Pt became more confused while in ED. Repeating same things over, not oriented to place. Labs ordered.    Reassessment 0120: pt states feels improved. Oriented, more alert to answering questions appropriately. Suspect concussion from head injury. Will place in observational stay for neuro checks.     K repleted IV      Amount and/or Complexity of Data Reviewed  External Data Reviewed: labs.     Details: Labs 2025 reviewed  Labs: ordered.  Radiology: ordered.     Details:   CT head without contrast  CT left hip without contrast  Comparison exam: CT head 11/1/2023    IMPRESSION:  Head:  No acute intracranial hemorrhage.  Mild small vessel ischemic disease.    CT left hip:  No acute fracture or dislocation.  Mild degenerative changes of the hip.  Small fat-containing left inguinal hernia.          Results faxed at 2:08 AM ET.  Please call with any questions.      Page Dyer M.D.     Discussion of management or test interpretation with external provider(s): D/w dr Corina Lopez  Parenteral controlled substances.  Drug therapy requiring intensive monitoring for toxicity.  Decision regarding  hospitalization.        Disposition and Plan     Clinical Impression:  1. Fall, initial encounter    2. Altered level of consciousness    3. Concussion with unknown loss of consciousness status, initial encounter    4. Contusion of left hip, initial encounter    5. Hypokalemia         Disposition:  Admit  6/4/2025  1:42 am    Follow-up:  No follow-up provider specified.  We recommend that you schedule follow up care with a primary care provider within the next three months to obtain basic health screening including reassessment of your blood pressure.      Medications Prescribed:  Current Discharge Medication List                Supplementary Documentation:         Hospital Problems       Present on Admission  Date Reviewed: 5/13/2025          ICD-10-CM Noted POA    * (Principal) Fall, initial encounter W19.XXXA 6/4/2025 Unknown

## 2025-06-04 NOTE — PLAN OF CARE
Problem: Patient Centered Care  Goal: Patient preferences are identified and integrated in the patient's plan of care  Description: Interventions:  - What would you like us to know as we care for you? From home with wife   - Provide timely, complete, and accurate information to patient/family  - Incorporate patient and family knowledge, values, beliefs, and cultural backgrounds into the planning and delivery of care  - Encourage patient/family to participate in care and decision-making at the level they choose  - Honor patient and family perspectives and choices  Outcome: Progressing     Problem: Patient/Family Goals  Goal: Patient/Family Long Term Goal  Description: Patient's Long Term Goal: discharge from hospital     Interventions:  - Monitor vitals, labs, imaging   - Follow MD's orders   - Administer medications per order    - See additional Care Plan goals for specific interventions  Outcome: Progressing  Goal: Patient/Family Short Term Goal  Description: Patient's Short Term Goal: decrease pain    Interventions:   - Monitor vitals, labs, imaging   - Follow MD's orders   - Administer medications per order    - See additional Care Plan goals for specific interventions  Outcome: Progressing     Problem: PAIN - ADULT  Goal: Verbalizes/displays adequate comfort level or patient's stated pain goal  Description: INTERVENTIONS:  - Encourage pt to monitor pain and request assistance  - Assess pain using appropriate pain scale  - Administer analgesics based on type and severity of pain and evaluate response  - Implement non-pharmacological measures as appropriate and evaluate response  - Consider cultural and social influences on pain and pain management  - Manage/alleviate anxiety  - Utilize distraction and/or relaxation techniques  - Monitor for opioid side effects  - Notify MD/LIP if interventions unsuccessful or patient reports new pain  - Anticipate increased pain with activity and pre-medicate as  appropriate  Outcome: Progressing     Problem: SAFETY ADULT - FALL  Goal: Free from fall injury  Description: INTERVENTIONS:  - Assess pt frequently for physical needs  - Identify cognitive and physical deficits and behaviors that affect risk of falls.  - [place] fall precautions as indicated by assessment.  - Educate pt/family on patient safety including physical limitations  - Instruct pt to call for assistance with activity based on assessment  - Modify environment to reduce risk of injury  - Provide assistive devices as appropriate  - Consider OT/PT consult to assist with strengthening/mobility  - Encourage toileting schedule  Outcome: Progressing     Problem: DISCHARGE PLANNING  Goal: Discharge to home or other facility with appropriate resources  Description: INTERVENTIONS:  - Identify barriers to discharge w/pt and caregiver  - Include patient/family/discharge partner in discharge planning  - Arrange for needed discharge resources and transportation as appropriate  - Identify discharge learning needs (meds, wound care, etc)  - Arrange for interpreters to assist at discharge as needed  - Consider post-discharge preferences of patient/family/discharge partner  - Complete POLST form as appropriate  - Assess patient's ability to be responsible for managing their own health  - Refer to Case Management Department for coordinating discharge planning if the patient needs post-hospital services based on physician/LIP order or complex needs related to functional status, cognitive ability or social support system  Outcome: Progressing     Problem: CARDIOVASCULAR - ADULT  Goal: Maintains optimal cardiac output and hemodynamic stability  Description: INTERVENTIONS:  - Monitor vital signs, rhythm, and trends  - Monitor for bleeding, hypotension and signs of decreased cardiac output  - Evaluate effectiveness of vasoactive medications to optimize hemodynamic stability  - Monitor arterial and/or venous puncture sites for  bleeding and/or hematoma  - Assess quality of pulses, skin color and temperature  - Assess for signs of decreased coronary artery perfusion - ex. Angina  - Evaluate fluid balance, assess for edema, trend weights  Outcome: Progressing  Goal: Absence of cardiac arrhythmias or at baseline  Description: INTERVENTIONS:  - Continuous cardiac monitoring, monitor vital signs, obtain 12 lead EKG if indicated  - Evaluate effectiveness of antiarrhythmic and heart rate control medications as ordered  - Initiate emergency measures for life threatening arrhythmias  - Monitor electrolytes and administer replacement therapy as ordered  Outcome: Progressing     Problem: Impaired Functional Mobility  Goal: Achieve highest/safest level of mobility/gait  Description: Interventions:  - Assess patient's functional ability and stability  - Promote increasing activity/tolerance for mobility and gait  - Educate and engage patient/family in tolerated activity level and precautions  Outcome: Progressing     Problem: Impaired Activities of Daily Living  Goal: Achieve highest/safest level of independence in self care  Description: Interventions:  - Assess ability and encourage patient to participate in ADLs to maximize function  - Promote sitting position while performing ADLs such as feeding, grooming, and bathing  - Educate and encourage patient/family in tolerated functional activity level and precautions during self-care  Outcome: Progressing

## 2025-06-04 NOTE — ED INITIAL ASSESSMENT (HPI)
Patient had mechanical fall onto concrete surface. Endorses hitting head. Endorses losing consciousness. Denies any blood thinner use. Patient experiencing severe throbbing pain on left sided pain. Also complains of left sided hip/leg pain with some bruising. Patient also complains of nausea.

## 2025-06-04 NOTE — H&P
[facility]  part of [facility]                                                                                                          History and Physical     Peg Garcia Patient Status:  Emergency    1/15/1955 MRN [id number]   Location [facility] EMERGENCY DEPARTMENT Attending Heather Williamson MD   Hosp Day # 0 PCP Luis Eduardo Chavez,      History obtained with assistance of  at bedside.    Chief Complaint: S/p fall    Subjective:    Peg Garcia is a 70 year old female with history of HTN, back pain who presented to the ED following a fall.  She hit her head on concrete for with associated loss of consciousness.  Per   she tripped on sidewalk while walking, landing on her left left side with pain to left hip and leg.  Also right elbow.  Following LOC, she became confused.  At baseline she is 'a very smart woman, does all the paperwork'.  Patient reported some nausea earlier which has resolved.    CT head and left hip with no acute findings  Vital stable  Labs with potassium 2.8    History/Other:      Past Medical History:Past Medical History[1]     Past Surgical History: Past Surgical History[2]    Social History:  reports that she has never smoked. She has never used smokeless tobacco. She reports that she does not drink alcohol and does not use drugs.    Family History: Family History[3]    Allergies: Allergies[4]    Medications:  Medications Ordered Prior to Encounter[5]    Review of Systems:   A comprehensive 14 point review of systems was completed.    Pertinent positives and negatives noted in the HPI.    Objective:     /61   Pulse 72   Temp 97.8 °F (36.6 °C) (Oral)   Resp 15   SpO2 98%   General: No acute distress.    HEENT: Normocephalic, atraumatic.  Neck: Supple.  Respiratory: Normal effort.  CTAB  Cardiovascular: S1, S2 regular.  Normal rate.  No murmur.   Abdomen: Soft, nontender distended.  Neurologic: Alert, oriented to  person and place.  Had some difficulty with time.  No focal deficit.  Musculoskeletal: Left hip swelling and tenderness.  Mild left leg swelling but with tenderness.  Mild right elbow tenderness.  Extremities: No edema  Psychiatric: Cooperative    Results:      Labs:  Labs Last 24 Hours:   BMP     CBC    Other     Na - Cl - BUN - Glu -   Hb 11.7   PTT - Procal -   K - CO2 - Cr -   WBC 5.7 >< .0  INR - CRP -   Renal Lytes Endo    Hct 35.5   Trop - D dim -   eGFR - Ca - POC Gluc  -    LFT   pBNP - Lactic -   eGFR AA - PO4 - A1c -   AST - APk - Prot -  LDL -     Mg - TSH -   ALT - T kym - Alb -          COVID-19 Lab Results    COVID-19  Lab Results   Component Value Date    COVID19 Not Detected 12/29/2021    COVID19 Not Detected 06/26/2020       Pro-Calcitonin  No results for input(s): \"PCT\" in the last 168 hours.    Cardiac  No results for input(s): \"TROP\", \"PBNP\" in the last 168 hours.    Creatinine Kinase  No results for input(s): \"CK\" in the last 168 hours.    Inflammatory Markers  No results for input(s): \"CRP\", \"KIM\", \"LDH\", \"DDIMER\" in the last 168 hours.    Imaging: Imaging data reviewed in Epic.    Assessment & Plan:    Confusion/AMS, possibly concussion  Fall with head trauma/LOC  - Per  at bedside, appears to be improving during my encounter  - Monitor  - Neurology consult as needed  - X-ray left leg, right shoulder    Hypokalemia  -May be due to HCTZ  - Replace per protocol    HTN  Back pain  - Home meds when reconciled    Quality:  DVT Prophylaxis: Heparin  CODE status: Full code  TILA: 1 to 2 days    IV antibiotics: None  IV pain medications: None  IV fluids: None  Diet: Cardiac  Monitoring: Mental state      Plan of care discussed with patient and family at bedside. Acknowledged understanding and agrees to plan. Also discussed with the ED physician.    Moderate [facility]  Time spent on this admission - examining patient, obtaining history, reviewing previous medical records, going over test  results/imaging and discussing plan of care. More than 50% of the time was spent in counseling and/or coordination of care related to mechanical fall, head trauma, altered mental status.   All questions answered.     Radha Arriaga MD  6/4/2025                   [1]   Past Medical History:   Back pain    Essential hypertension   [2] History reviewed. No pertinent surgical history.  [3] No family history on file.  [4] No Known Allergies  [5]   Current Facility-Administered Medications on File Prior to Encounter   Medication Dose Route Frequency Provider Last Rate Last Admin    [COMPLETED] onabotulinumtoxinA (Botox) injection 200 Units  200 Units Injection Once    200 Units at 05/13/25 1407     Current Outpatient Medications on File Prior to Encounter   Medication Sig Dispense Refill    lidocaine 5 % External Patch Apply 1 patch to the left arm, and 1 patch to the left lower extremity every 24 hours for the next 10 to 14 days, then use as needed thereafter. (Patient not taking: Reported on 4/23/2025) 30 each 0    triamcinolone 0.1 % External Cream Apply 1 Application topically 2 (two) times daily as needed. 60 g 3    hydroCHLOROthiazide 12.5 MG Oral Tab Take 1 tablet (12.5 mg total) by mouth daily. 90 tablet 3    acetaminophen 500 MG Oral Tab Take 1 tablet (500 mg total) by mouth every 6 (six) hours as needed for Pain. 50 tablet 0    tiZANidine HCl 4 MG Oral Cap       amitriptyline 25 MG Oral Tab Take 2 tablets (50 mg total) by mouth nightly. 180 tablet 3    diazePAM 2 MG Oral Tab Take 1 tablet (2 mg total) by mouth 3 (three) times daily as needed (muscle spasm). 12 tablet 0    pregabalin (LYRICA) 150 MG Oral Cap Take 1 capsule (150 mg total) by mouth 2 (two) times daily. 60 capsule 0    levocetirizine 5 MG Oral Tab Take 1 tablet (5 mg total) by mouth every evening. 30 tablet 1

## 2025-06-04 NOTE — OCCUPATIONAL THERAPY NOTE
OCCUPATIONAL THERAPY EVALUATION - INPATIENT     Room Number: 555/555-A  Evaluation Date: 6/4/2025  Type of Evaluation: Initial       Physician Order: IP Consult to Occupational Therapy  Reason for Therapy: ADL/IADL Dysfunction and Discharge Planning    OCCUPATIONAL THERAPY ASSESSMENT   RN cleared pt for participation in OT session, which was completed in collaboration with PT. Upon arrival, pt was supine in bed and agreeable to activity.  present during session. Pt was left in chair and alarm was activated. Call light and all needs left in reach. Handoff given to RN regarding blurry vision and lethargy.    Patient is a 70 year old female admitted 6/3/2025 for fall; findings negative; hypokalemia.  Prior to admission, pt was independent with RW.  Patient is currently requiring up to max A for ADLs overall along with min A for supine to sit, CGA for sitting at EOB, min A for STS, and min A for functional transfer at RW level. Pt tolerated about <1 minutes of supported standing.  Pt has the following impairments: lethargy; weakness; pain, decreased vision.    ACTIVITY TOLERANCE  Pulse: 63        BP: (!) 143/102           Oxygen Therapy  SPO2% on Room Air at Rest: 93    Education provided  Educated pt about role of OT and hospital therapy process as well as proper safety techniques including proper hand placement, body mechanics, safety techniques. Pt/family verbalized/demonstrated fair carryover.    Patient will benefit from continued skilled OT Services to promote return to prior level of function and safety with continuous assistance and gradual rehabilitative therapy    PLAN  OT Treatment Plan: Balance activities;Energy conservation/work simplification techniques;Continued evaluation;Compensatory technique education;Fine motor coordination activities;Neuromuscluar reeducation;Equipment eval/education;Patient/Family training;Patient/Family education;Cognitive reorientation;Endurance training;UE  strengthening/ROM;Functional transfer training;Visual perceptual training;IADL training;ADL training      OCCUPATIONAL THERAPY MEDICAL/SOCIAL HISTORY     Problem List  Principal Problem:    Fall, initial encounter  Active Problems:    Altered level of consciousness    Concussion with unknown loss of consciousness status, initial encounter    Contusion of left hip, initial encounter    Hypokalemia      Past Medical History  Past Medical History[1]    Past Surgical History  Past Surgical History[2]    HOME SITUATION  Type of Home: House  Home Layout: Two level  Lives With: Spouse                              SUBJECTIVE  \"I can sit up.\"    OCCUPATIONAL THERAPY EXAMINATION      OBJECTIVE  Precautions: Bed/chair alarm  Fall Risk: High fall risk    PAIN ASSESSMENT  Rating: Unable to rate  Location: L hip; R elbow  Management Techniques: Ice    COGNITION  Arousal/Alertness:  lethargic  Orientation Level:  oriented to place, oriented to situation, and oriented to person; states it is march 2025:    VISION  Unable to read clock;  Unable to read near (ID badge with different size fonts)  Reports blurry vision  Pt turns head instead of scanning for saccade testing         RANGE OF MOTION   R elbow swollen and painful; flexion/extension limited         COORDINATION  Gross Motor: WFL   Fine Motor: WFL     ACTIVITIES OF DAILY LIVING ASSESSMENT  AM-PAC ‘6-Clicks’ Inpatient Daily Activity Short Form  How much help from another person does the patient currently need…  -   Putting on and taking off regular lower body clothing?: A Lot  -   Bathing (including washing, rinsing, drying)?: A Lot  -   Toileting, which includes using toilet, bedpan or urinal? : A Lot  -   Putting on and taking off regular upper body clothing?: A Little  -   Taking care of personal grooming such as brushing teeth?: A Little  -   Eating meals?: A Little    AM-PAC Score:  Score: 15  Approx Degree of Impairment: 56.46%  Standardized Score (AM-PAC Scale):  34.69  CMS Modifier (G-Code): CK       FUNCTIONAL TRANSFER ASSESSMENT  Supine to Sit : Minimal Assist        STS: min A  Toilet Transfer: min A  Chair Transfer: min A at RW level    FUNCTIONAL ADL ASSESSMENT  Feeding: setup/SBA due to lethargy    Grooming: setup/SBA for washing face due to lethargy       The patient's Approx Degree of Impairment: 56.46% has been calculated based on documentation in the [facility] '6 clicks' Inpatient Daily Activity Short Form.  Research supports that patients with this level of impairment may benefit from GR. Final disposition will be made by interdisciplinary medical team.    OT Goals  Patients self stated goal is: to go home     Patient will complete functional transfer with mod I  Comment:     Patient will complete toileting with mod I  Comment:     Patient will tolerate standing for 3 minutes in prep for adls with mod I   Comment:    Patient will complete item retrieval with mod i  Comment:          Goals  on:   Frequency: 3-5x/wk    Patient Evaluation Complexity Level:   Occupational Profile/Medical History MODERATE - Expanded review of history including review of medical or therapy record   Specific performance deficits impacting engagement in ADL/IADL MODERATE  3 - 5 performance deficits   Client Assessment/Performance Deficits MODERATE - Comorbidities and min to mod modifications of tasks    Clinical Decision Making MODERATE - Analysis of occupational profile, detailed assessments, several treatment options    Overall Complexity MODERATE     OT Session Time: 20 minutes  Self-Care Home Management: 10 minutes     Sharon Sotelo OT  [facility]  Inpatient Rehabilitation  Occupational Therapy  [phone]         [1]   Past Medical History:   Back pain    Essential hypertension   [2] History reviewed. No pertinent surgical history.

## 2025-06-04 NOTE — BH PROGRESS NOTE
[facility] was consulted for PHQ-4 score = 9.     Writer met with pt and  at bedside. Pt calm and pleasant. Per chart review no hx of depression or anxiety. Pt reported feeling ok the last 30 days and a bit anxious over the last few days. Mohan STAPLES, no safety concerns reported or observed. Pt currently does not have any OP providers for psychiatry and therapy; and declined additional referrals and had no further questions/concerns at this time.

## 2025-06-04 NOTE — CM/SW NOTE
Patient failed inpatient criteria. Second level of review completed and supports observation.  UR committee in agreement. Inpatient UR discussed with Dr. Velasco  who approves observation status.  MOON  notice explained to spouse  . Signed copy placed in chart  Order for observation in place.    Sneha RODRIGUEZ, Utilization Review   Ext [phone]

## 2025-06-04 NOTE — PHYSICAL THERAPY NOTE
Physical Therapy Contact Note    Orders received and chart reviewed. Attempted to see patient for Physical Therapy services. Patient admitted for fall, pending x-ray of R elbow and L tibia/fibula. Will re-schedule visit pending imaging results. Please updated weight bearing and activity restrictions as appropriate.    Laurel Soriano, PT, DPT

## 2025-06-04 NOTE — ED QUICK NOTES
Orders for admission, patient is aware of plan and ready to go upstairs. Any questions, please call ED RN Smiley at extension [phone].     Patient Covid vaccination status: Fully vaccinated     COVID Test Ordered in ED: None    COVID Suspicion at Admission: N/A    Running Infusions: Medication Infusions[1] None    Mental Status/LOC at time of transport: aox4    Other pertinent information:   CIWA score: N/A   NIH score:  N/A             [1]

## 2025-06-04 NOTE — PLAN OF CARE
Problem: Patient Centered Care  Goal: Patient preferences are identified and integrated in the patient's plan of care  Description: Interventions:  - What would you like us to know as we care for you?   - Provide timely, complete, and accurate information to patient/family  - Incorporate patient and family knowledge, values, beliefs, and cultural backgrounds into the planning and delivery of care  - Encourage patient/family to participate in care and decision-making at the level they choose  - Honor patient and family perspectives and choices  Outcome: Progressing     Problem: PAIN - ADULT  Goal: Verbalizes/displays adequate comfort level or patient's stated pain goal  Description: INTERVENTIONS:  - Encourage pt to monitor pain and request assistance  - Assess pain using appropriate pain scale  - Administer analgesics based on type and severity of pain and evaluate response  - Implement non-pharmacological measures as appropriate and evaluate response  - Consider cultural and social influences on pain and pain management  - Manage/alleviate anxiety  - Utilize distraction and/or relaxation techniques  - Monitor for opioid side effects  - Notify MD/LIP if interventions unsuccessful or patient reports new pain  - Anticipate increased pain with activity and pre-medicate as appropriate  Outcome: Progressing     Problem: SAFETY ADULT - FALL  Goal: Free from fall injury  Description: INTERVENTIONS:  - Assess pt frequently for physical needs  - Identify cognitive and physical deficits and behaviors that affect risk of falls.  - [place] fall precautions as indicated by assessment.  - Educate pt/family on patient safety including physical limitations  - Instruct pt to call for assistance with activity based on assessment  - Modify environment to reduce risk of injury  - Provide assistive devices as appropriate  - Consider OT/PT consult to assist with strengthening/mobility  - Encourage toileting schedule  Outcome:  Progressing     Problem: DISCHARGE PLANNING  Goal: Discharge to home or other facility with appropriate resources  Description: INTERVENTIONS:  - Identify barriers to discharge w/pt and caregiver  - Include patient/family/discharge partner in discharge planning  - Arrange for needed discharge resources and transportation as appropriate  - Identify discharge learning needs (meds, wound care, etc)  - Arrange for interpreters to assist at discharge as needed  - Consider post-discharge preferences of patient/family/discharge partner  - Complete POLST form as appropriate  - Assess patient's ability to be responsible for managing their own health  - Refer to Case Management Department for coordinating discharge planning if the patient needs post-hospital services based on physician/LIP order or complex needs related to functional status, cognitive ability or social support system  Outcome: Progressing     Problem: CARDIOVASCULAR - ADULT  Goal: Maintains optimal cardiac output and hemodynamic stability  Description: INTERVENTIONS:  - Monitor vital signs, rhythm, and trends  - Monitor for bleeding, hypotension and signs of decreased cardiac output  - Evaluate effectiveness of vasoactive medications to optimize hemodynamic stability  - Monitor arterial and/or venous puncture sites for bleeding and/or hematoma  - Assess quality of pulses, skin color and temperature  - Assess for signs of decreased coronary artery perfusion - ex. Angina  - Evaluate fluid balance, assess for edema, trend weights  Outcome: Progressing  Goal: Absence of cardiac arrhythmias or at baseline  Description: INTERVENTIONS:  - Continuous cardiac monitoring, monitor vital signs, obtain 12 lead EKG if indicated  - Evaluate effectiveness of antiarrhythmic and heart rate control medications as ordered  - Initiate emergency measures for life threatening arrhythmias  - Monitor electrolytes and administer replacement therapy as ordered  Outcome: Progressing      Problem: Impaired Functional Mobility  Goal: Achieve highest/safest level of mobility/gait  Description: Interventions:  - Assess patient's functional ability and stability  - Promote increasing activity/tolerance for mobility and gait  - Educate and engage patient/family in tolerated activity level and precautions  Outcome: Progressing     Problem: Impaired Activities of Daily Living  Goal: Achieve highest/safest level of independence in self care  Description: Interventions:  - Assess ability and encourage patient to participate in ADLs to maximize function  - Promote sitting position while performing ADLs such as feeding, grooming, and bathing  - Educate and encourage patient/family in tolerated functional activity level and precautions during self-care  Outcome: Progressing

## 2025-06-04 NOTE — SPIRITUAL CARE NOTE
Spiritual Care Visit Note    Patient Name: Peg Garcia Date of Spiritual Care Visit: 25   : 1/15/1955 Primary Dx: Fall, initial encounter       Referred By: Referral From: Other (Comment) (IDT)    Spiritual Care Taxonomy:    Intended Effects: Build relationship of care and support    Methods: Assist with spiritual/Voodoo practices, Offer spiritual/Voodoo support, Offer emotional support    Interventions: Acknowledge current situation, Acknowledge response to difficult experience, Active listening, Ask guided questions, Ask guided questions about abraham, [place] for care team member(s), Explain  role, Identify supportive relationship(s), Prayer for healing, Provide hospitality    Visit Type/Summary:     - Spiritual Care: Responded to a request via the on call phone Consulted with RN prior to visit. Offered empathic listening and emotional support. Family expressed appreciation for  visit. Provided information regarding how to contact Spiritual Care and left a Spiritual Care information card. Prayed with patient and family before going to CT. Patient has four children.  remains available as needed for follow up.    Spiritual Care support can be requested via an Epic consult. For urgent/immediate needs, please contact the On Call  at: [place]: ext [phone]    Chaplain Resident, Jess Alvarado, PhD

## 2025-06-04 NOTE — SIGNIFICANT EVENT
RRT    *See RRT Documentation Record*    Reason the RRT was called: Altered mental status  Assessment of patient leading up to RRT: Vital signs, blood sugar, facial nerves, Orientation status  Interventions/Testing: ABG, CT head  Patient Outcome/Disposition: Stayed on unit  Family Notified: yes  Name of family notified: Vazquez ( present for RRT)

## 2025-06-04 NOTE — ED QUICK NOTES
RN reassessing patient for medication administration. Patient could not state her name nor birthday \"Verónica duran my birthday is 15\" Patient continued to repeat the word \"fifteen\" for several minutes and that she sees rainbows. Morphine discontinued and not given due to hallucinations. MD notified. See orders.

## 2025-06-04 NOTE — SIGNIFICANT EVENT
RRT note.    RRT called for pt with periods of excessive somnolence and minimal responsiveness.    Pt very sleepy but wakes up after a minute and is interactive.  No focal sx.    STAT CT head ordered.  Check abd.    Pt had 2 tabs norco at 3am.   Stopped 2 tabs norco.   Stopped valium and lyrica.    Neuro consult.    Attending updated.    >35 minutes critical care time.    Gerry Duncan MD  6/4/2025

## 2025-06-04 NOTE — PHYSICAL THERAPY NOTE
PHYSICAL THERAPY EVALUATION - INPATIENT     Room Number: 555/555-A  Evaluation Date: 6/4/2025  Type of Evaluation: Initial   Physician Order: PT Eval and Treat    Presenting Problem: fall, AMS  Co-Morbidities : HTN, back pain  Reason for Therapy: Mobility Dysfunction and Discharge Planning    PHYSICAL THERAPY ASSESSMENT   Patient is a 70 year old female admitted 6/3/2025 for fall.  Prior to admission, patient's baseline is independent.  Patient is currently functioning below baseline with bed mobility, transfers, gait, stair negotiation, maintaining seated position, standing prolonged periods, and performing household tasks.  Patient is requiring moderate assist as a result of the following impairments: decreased functional strength, decreased endurance/aerobic capacity, pain, impaired standing balance, decreased muscular endurance, and medical status.  Physical Therapy will continue to follow for duration of hospitalization.    Patient will benefit from continued skilled PT Services to promote return to prior level of function and safety with continuous assistance and gradual rehabilitative therapy .    PLAN DURING HOSPITALIZATION  Nursing Mobility Recommendation : 1 Assist     PT Treatment Plan: Bed mobility, Body mechanics, Endurance, Coordination, Energy conservation, Gait training, Patient education, Strengthening, Transfer training, Balance training  Rehab Potential : Fair  Frequency (Obs): 3-5x/week     PHYSICAL THERAPY MEDICAL/SOCIAL HISTORY   History related to current admission: Peg Garcia is a 70 year old female with history of HTN, back pain who presented to the ED following a fall.  She hit her head on concrete for with associated loss of consciousness.      Problem List  Principal Problem:    Fall, initial encounter  Active Problems:    Altered level of consciousness    Concussion with unknown loss of consciousness status, initial encounter    Contusion of left hip, initial encounter     Hypokalemia    HOME SITUATION  Type of Home: House  Home Layout: Two level  Stairs to Enter : 0   Stairs to Bedroom:  (FOS to bedroom)    Lives With: Spouse      Prior Level of Union: Prior to admission patient was independent with performance of all ADL's and performance of functional mobility.    SUBJECTIVE  Patient agreeable to participation in PT. Lethargic throughout session.    PHYSICAL THERAPY EXAMINATION   OBJECTIVE  Precautions: Bed/chair alarm  Fall Risk: High fall risk    PAIN ASSESSMENT  Rating: Unable to rate  Location: LLE, RUE  Management Techniques: Activity promotion, Body mechanics, Breathing techniques, Relaxation, Repositioning    COGNITION  Overall Cognitive Status:  Impaired  Arousal/Alertness:  delayed responses to stimuli  Orientation Level:  oriented to place, oriented to situation, oriented to person, and disoriented to time  Safety Judgement:  decreased awareness of need for assistance and decreased awareness of need for safety    RANGE OF MOTION AND STRENGTH ASSESSMENT  Upper extremity ROM and strength are within functional limits BUE.  Lower extremity ROM is within functional limits BLE.  Lower extremity strength is within functional limits BLE.    BALANCE  Static Sitting: Fair  Dynamic Sitting: Poor  Static Standing: Poor  Dynamic Standing: Dependent    AM-PAC '6-Clicks' INPATIENT SHORT FORM - BASIC MOBILITY  How much difficulty does the patient currently have...  Patient Difficulty: Turning over in bed (including adjusting bedclothes, sheets and blankets)?: A Little   Patient Difficulty: Sitting down on and standing up from a chair with arms (e.g., wheelchair, bedside commode, etc.): A Little   Patient Difficulty: Moving from lying on back to sitting on the side of the bed?: A Little   How much help from another person does the patient currently need...   Help from Another: Moving to and from a bed to a chair (including a wheelchair)?: A Lot   Help from Another: Need to walk in  hospital room?: A Lot   Help from Another: Climbing 3-5 steps with a railing?: A Lot     AM-PAC Score:  Raw Score: 15   Approx Degree of Impairment: 57.7%   Standardized Score (AM-PAC Scale): 39.45   CMS Modifier (G-Code): CK    FUNCTIONAL ABILITY STATUS  Functional Mobility/Gait Assessment  Gait Assistance: Moderate assistance  Distance (ft): 2-3 steps to bedside chair  Assistive Device: Rolling walker  Pattern:  (decreased diana, decreased step length, forward flexed posture, narrow base of support, verbal cues for sequencing and rolling walker management.)  Rolling: moderate assist  Supine to Sit: moderate assist  Sit to Supine: not tested  Sit to Stand: moderate assist    Exercise/Education Provided:  Education Provided To: Patient, Family/Caregiver Patient Education: Plan of Care, Role of Physical Therapy, DME Recommendations, Discharge Recommendations, Functional Transfer Techniques, Fall Prevention, Posture/Positioning, Energy Conservation, Proper Body Mechanics, Gait Training Patient's Response to Education: Requires Further Education     Skilled Therapy Provided: Patient received supine in bed at initiation of session agreeable to participation in PT. Patient tolerates treatment fairly, requires moderate assistance for all out of bed mobility. Patient most limited due to lethargy and pain. Patient left in bedside chair, family at bedside, alarm engaged, lines intact, needs in reach and handoff to RN.    The patient's Approx Degree of Impairment: 57.7% has been calculated based on documentation in the [facility] '6 clicks' Inpatient Basic Mobility Short Form.  Research supports that patients with this level of impairment may benefit from gradual rehab.  Final disposition will be made by interdisciplinary medical team.    Patient End of Session: Up in chair, Needs met, Call light within reach, RN aware of session/findings, All patient questions and concerns addressed, Hospital anti-slip socks, Alarm set, Family  present    CURRENT GOALS  Goals to be met by: 6/18/25  Patient Goal Patient's self-stated goal is: to return to PLOF   Goal #1 Patient is able to demonstrate supine - sit EOB @ level: modified independent     Goal #1   Current Status    Goal #2 Patient is able to demonstrate transfers EOB to/from Chair/Wheelchair at assistance level: modified independent with least restrictive device.     Goal #2  Current Status    Goal #3 Patient is able to ambulate >150 feet with assist device: least restrictive device at assistance level: modified independent   Goal #3   Current Status    Goal #4 Patient will negotiate 8 stairs/one curb w/ assistive device and supervision   Goal #4   Current Status    Goal #5 Patient to demonstrate independence with home activity/exercise instructions provided to patient in preparation for discharge.   Goal #5   Current Status    Goal #6    Goal #6  Current Status      Patient Evaluation Complexity Level:  History High - 3 or more personal factors and/or co-morbidities   Examination of body systems Moderate - addressing a total of 3 or more elements   Clinical Presentation  Moderate - Evolving   Clinical Decision Making  Moderate Complexity     Therapeutic Activity:  10 minutes    Laurel Soriano PT, DPT

## 2025-06-05 LAB
ANION GAP SERPL CALC-SCNC: 7 MMOL/L (ref 0–18)
BASOPHILS # BLD AUTO: 0.02 X10(3) UL (ref 0–0.2)
BASOPHILS NFR BLD AUTO: 0.5 %
BUN BLD-MCNC: 13 MG/DL (ref 9–23)
BUN/CREAT SERPL: 12.4 (ref 10–20)
CALCIUM BLD-MCNC: 9.1 MG/DL (ref 8.7–10.4)
CHLORIDE SERPL-SCNC: 102 MMOL/L (ref 98–112)
CO2 SERPL-SCNC: 31 MMOL/L (ref 21–32)
CREAT BLD-MCNC: 1.05 MG/DL (ref 0.55–1.02)
DEPRECATED RDW RBC AUTO: 44.6 FL (ref 35.1–46.3)
EGFRCR SERPLBLD CKD-EPI 2021: 57 ML/MIN/1.73M2 (ref 60–?)
EOSINOPHIL # BLD AUTO: 0.07 X10(3) UL (ref 0–0.7)
EOSINOPHIL NFR BLD AUTO: 1.7 %
ERYTHROCYTE [DISTWIDTH] IN BLOOD BY AUTOMATED COUNT: 12.5 % (ref 11–15)
GLUCOSE BLD-MCNC: 83 MG/DL (ref 70–99)
HCT VFR BLD AUTO: 34.3 % (ref 35–48)
HGB BLD-MCNC: 11.3 G/DL (ref 12–16)
IMM GRANULOCYTES # BLD AUTO: 0 X10(3) UL (ref 0–1)
IMM GRANULOCYTES NFR BLD: 0 %
LYMPHOCYTES # BLD AUTO: 1.92 X10(3) UL (ref 1–4)
LYMPHOCYTES NFR BLD AUTO: 45.4 %
MCH RBC QN AUTO: 31.8 PG (ref 26–34)
MCHC RBC AUTO-ENTMCNC: 32.9 G/DL (ref 31–37)
MCV RBC AUTO: 96.6 FL (ref 80–100)
MONOCYTES # BLD AUTO: 0.46 X10(3) UL (ref 0.1–1)
MONOCYTES NFR BLD AUTO: 10.9 %
NEUTROPHILS # BLD AUTO: 1.76 X10 (3) UL (ref 1.5–7.7)
NEUTROPHILS # BLD AUTO: 1.76 X10(3) UL (ref 1.5–7.7)
NEUTROPHILS NFR BLD AUTO: 41.5 %
OSMOLALITY SERPL CALC.SUM OF ELEC: 289 MOSM/KG (ref 275–295)
PLATELET # BLD AUTO: 227 10(3)UL (ref 150–450)
POTASSIUM SERPL-SCNC: 3.6 MMOL/L (ref 3.5–5.1)
RBC # BLD AUTO: 3.55 X10(6)UL (ref 3.8–5.3)
SODIUM SERPL-SCNC: 140 MMOL/L (ref 136–145)
WBC # BLD AUTO: 4.2 X10(3) UL (ref 4–11)

## 2025-06-05 PROCEDURE — 99232 SBSQ HOSP IP/OBS MODERATE 35: CPT | Performed by: INTERNAL MEDICINE

## 2025-06-05 PROCEDURE — 99233 SBSQ HOSP IP/OBS HIGH 50: CPT | Performed by: FAMILY MEDICINE

## 2025-06-05 NOTE — PROGRESS NOTES
[facility]  [address] [place] [address], [address]  [facility] [id number]  [phone]            Peg Garcia Patient Status:  Observation    1/15/1955 MRN [id number]   Location [facility] 5SW/SE Attending Kerry Velasco MD   Hosp Day # 1 PCP Luis Eduardo Chavez, DO     Subjective:  Patient seen as a follow-up this morning. Less drowsy and more alert today compared to yesterday. Denies any headaches, dizziness, changes in vision, weakness, or changes in sensation.     Current Hospital Medications[1]    Objective:  Blood pressure 123/81, pulse 67, temperature 98.1 °F (36.7 °C), temperature source Oral, resp. rate 18, weight 193 lb 5.5 oz (87.7 kg), SpO2 99%.    Physical Exam:  Vitals:    25 1419 25 2043 25 0438 25 1026   BP: 159/86 120/73 144/80 123/81   Pulse: 62 67 61 67   Resp: 16 16 16 18   Temp: 97.6 °F (36.4 °C) 97.6 °F (36.4 °C) 97.5 °F (36.4 °C) 98.1 °F (36.7 °C)   TempSrc: Oral Oral Oral Oral   SpO2: 99% 97% 99% 99%   Weight:           General: No apparent distress, well nourished, well groomed.  Head- Normocephalic, atraumatic  Eyes- No redness or swelling  Skin- Skin color and texture normal, no rashes or lesions    Neurological:     Mental Status- Alert and oriented x3. Able to follow commands    Cranial Nerves:  II.- Visual fields full to confrontation  III, IV, VI- EOM intact, RYAN  V. Facial sensation intact  VII. Face symmetric, no facial weakness  VIII. Hearing intact to whisper  IX. Palate elevates symmetrically.  XI. Shoulder shrug is intact  XII. Tongue is midline    Motor Exam:  Strength-LUE extremity 5/5 proximally and distally, RUE to antigravity but limited by pain                 -RLE extremities 5/5 proximally and distally, LLE to antigravity but limited by pain    Sensory Exam:  Light touch sensation- intact in all 4 extremities    Deep Tendon Reflexes:  2+ and symmetric  No clonus  No Babinski sign    Coordination:  Finger  to nose intact    Lab Results   Component Value Date    WBC 4.2 06/05/2025    HGB 11.3 (L) 06/05/2025    HCT 34.3 (L) 06/05/2025    .0 06/05/2025    CREATSERUM 1.05 (H) 06/05/2025    BUN 13 06/05/2025     06/05/2025    K 3.6 06/05/2025     06/05/2025    CO2 31.0 06/05/2025    GLU 83 06/05/2025    CA 9.1 06/05/2025    ALB 4.7 06/04/2025    ALKPHO 115 06/04/2025    BILT 0.5 06/04/2025    TP 7.6 06/04/2025    AST 25 06/04/2025    ALT 20 06/04/2025    TSH 1.474 12/02/2024    ESRML 38 (H) 09/23/2022    CRP 0.47 (H) 09/23/2022    PHOS 2.8 09/14/2023    TROP 0.00 09/19/2017     12/30/2021    ETOH <3 06/04/2025       CT BRAIN OR HEAD (CPT=70450)  Result Date: 6/4/2025  CONCLUSION: No acute intracranial abnormality.    Dictated by (CST): Shad Reaves MD on 6/04/2025 at 3:52 PM     Finalized by (CST): Shad Reaves MD on 6/04/2025 at 3:53 PM          CT BRAIN OR HEAD (CPT=70450)  Result Date: 6/4/2025  CONCLUSION:  Mild chronic microvascular ischemic disease without acute intracranial abnormality.  Preliminary report was submitted by the tidy teleradiologist and there are no significant discrepancies.  Dictated by (CST): Jason Leyva MD on 6/04/2025 at 1:03 PM     Finalized by (CST): Jason Leyva MD on 6/04/2025 at 1:06 PM          CT HIP(BONE) LEFT (CPT=73700)  Result Date: 6/4/2025  CONCLUSION:   Mild left hip osteoarthritis without acute fracture or dislocation.  Preliminary report was submitted by the tidy teleradiologist and there are no significant discrepancies.    Dictated by (CST): Jason Leyva MD on 6/04/2025 at 12:46 PM     Finalized by (CST): Jason Leyva MD on 6/04/2025 at 12:49 PM          XR ELBOW, COMPLETE (MIN 3 VIEWS), RIGHT (CPT=73080)  Result Date: 6/4/2025  CONCLUSION: No acute fracture or dislocation.    Dictated by (CST): Kwan Lu MD on 6/04/2025 at 10:19 AM     Finalized by (CST): Kwan Lu MD on 6/04/2025 at 10:20 AM          XR TIBIA + FIBULA (2  VIEWS), LEFT (CPT=73590)  Result Date: 6/4/2025  CONCLUSION: No acute fracture or dislocation.    Dictated by (CST): Kwan Lu MD on 6/04/2025 at 10:10 AM     Finalized by (CST): Kwan Lu MD on 6/04/2025 at 10:12 AM                Problem List[2]    Assessment:  Peg Garcia is a 70 year old female with a past medical history of HTN, back pain who presents after a fall. Neurology consulted for excessive somnolence.     Somnolence  -Headache and dizziness likely related to post concussion syndrome. Somnolence likely due to recent administration of multiple sedative medications. Patient received Ativan 0.5mg at 0058, Norco 2 tablets at 0319, amitriptyline 50mg at 0542, and Lyrica 150mg at 0828.   -Consider using non drowsy medication such as tylenol for pain  -CT Brain on admission and repeat STAT CT with no acute findings   -Patient educated on post concussion syndrome      Thank you for allowing me to participate in the care of your patient.    VANESSA Urbina  6/5/2025    Is this a shared or split note between Advanced Practice Provider and Physician? Yes       [1]   Current Facility-Administered Medications   Medication Dose Route Frequency    [Held by provider] heparin (Porcine) 5000 UNIT/ML injection 5,000 Units  5,000 Units Subcutaneous Q8H SHERRY    acetaminophen (Tylenol) tab 650 mg  650 mg Oral Q4H PRN    acetaminophen (Tylenol Extra Strength) tab 500 mg  500 mg Oral Q4H PRN    ondansetron (Zofran) 4 MG/2ML injection 4 mg  4 mg Intravenous Q6H PRN    amitriptyline (Elavil) tab 50 mg  50 mg Oral Nightly    hydroCHLOROthiazide tab 12.5 mg  12.5 mg Oral Daily    cetirizine (ZyrTEC) tab 5 mg  5 mg Oral Daily   [2]   Patient Active Problem List  Diagnosis    Sleep disturbance    Spinal stenosis at L4-L5 level    Degenerative disc disease, lumbar    Chronic left-sided lumbar radiculopathy    Hypertension    Right lumbar radiculopathy    Genital herpes simplex    Severe obesity (BMI 35.0-39.9) with  comorbidity (HCC)    CKD (chronic kidney disease) stage 3, GFR 30-59 ml/min (HCC)    Fall, initial encounter    Altered level of consciousness    Concussion with unknown loss of consciousness status, initial encounter    Contusion of left hip, initial encounter    Hypokalemia

## 2025-06-05 NOTE — PROGRESS NOTES
Progress Note     Peg Garcia Patient Status:  Observation    1/15/1955 MRN [id number]   Location [facility] 5SW/SE Attending Kerry Velasco MD   Hosp Day # 1 PCP Luis Eduardo Chavez DO     Chief Complaint: patient presented with   Chief Complaint   Patient presents with    Fall       Subjective:   S: Patient c/o left hip pain ,much more awake today, AAOx 3     Review of Systems:   10 point ROS completed and was negative, except for pertinent positive and negatives stated in subjective.    Objective:   Vital signs:  Temp:  [97.3 °F (36.3 °C)-98.1 °F (36.7 °C)] 98.1 °F (36.7 °C)  Pulse:  [61-67] 67  Resp:  [16-18] 18  BP: (120-159)/() 123/81  SpO2:  [97 %-100 %] 99 %    Wt Readings from Last 6 Encounters:   25 193 lb 5.5 oz (87.7 kg)   25 202 lb (91.6 kg)   25 195 lb (88.5 kg)   24 197 lb (89.4 kg)   24 205 lb 3.2 oz (93.1 kg)   24 190 lb (86.2 kg)         Physical Exam:    General: No acute distress. Alert ,         Respiratory: Clear to auscultation bilaterally. No wheezes. No rhonchi.  Cardiovascular: S1, S2. Regular rate and rhythm. No murmurs, rubs or gallops.   Abdomen: Soft, nontender, nondistended.  Positive bowel sounds. No rebound or guarding.  Neurologic: No focal neurological deficits.   Musculoskeletal: Moves all extremities.  Extremities: No edema.    Results:   Diagnostic Data:      Labs:    Labs Last 24 Hours:   BMP     CBC    Other     Na 140 Cl 102 BUN 13 Glu 83   Hb 11.3   PTT - Procal -   K 3.6 CO2 31.0 Cr 1.05   WBC 4.2 >< .0  INR - CRP -   Renal Lytes Endo    Hct 34.3   Trop - D dim -   eGFR - Ca 9.1 POC Gluc  84    LFT   pBNP - Lactic -   eGFR AA - PO4 - A1c -   AST - APk - Prot -  LDL -     Mg - TSH -   ALT - T kym - Alb -        COVID-19 Lab Results    COVID-19  Lab Results   Component Value Date    COVID19 Not Detected 2021    COVID19 Not Detected 2020       Pro-Calcitonin  No results for input(s): \"PCT\" in the  last 168 hours.    Cardiac  No results for input(s): \"TROP\", \"PBNP\" in the last 168 hours.    Creatinine Kinase  No results for input(s): \"CK\" in the last 168 hours.    Inflammatory Markers  No results for input(s): \"CRP\", \"KIM\", \"LDH\", \"DDIMER\" in the last 168 hours.    Imaging: Imaging data reviewed in Epic.    Medications: Scheduled Medications[1]    Assessment & Plan:   ASSESSMENT / PLAN:           Confusion/AMS, possibly concussion  Fall with head trauma/LOC  - Per  at bedside, appears to be improving during my encounter  - Monitor  - Neurology consult-recommend holding drowsy meds   - X-ray left leg, right shoulder-reviewed no Fx  PT/OT  Need pain control   Mental status improved today   Possibly dc tomorrow if pain control and work with PT       HTN  Back pain  - Home meds when reconciled     Quality:  DVT Prophylaxis: Heparin  CODE status: Full code        Quality:  DVT Prophylaxis: scds  CODE status: full   DISPO: pending clinical improvement.   Estimated date of discharge: To be determined  Discharge is dependent on: Improved clinical status  At this point Patient is expected to be discharge to: Home versus rehab      Plan of care discussed with Patient and RN.     Coordinated care with providers and counseling re: treatment plan and workup     Kerry Velasco MD    Supplementary Documentation:         **Certification      PHYSICIAN Certification of Need for Inpatient Hospitalization - Initial Certification    Patient will require inpatient services that will reasonably be expected to span two midnight's based on the clinical documentation in H+P.   Based on patients current state of illness, I anticipate that, after discharge, patient will require TBD.             I personally reviewed the available laboratories, imaging including operative report. I discussed/will discuss the case with patient and her nurse. I ordered laboratories studies. I adjusted medications including not applicable today.  Medical decision making high, risk is high.     >55min spent, >50% spent counseling and coordinating care in the form of educating pt/family and d/w consultants and staff. Most of the time spent discussing the above plan.               [1]    [Held by provider] heparin  5,000 Units Subcutaneous Q8H SHERRY    amitriptyline  50 mg Oral Nightly    hydroCHLOROthiazide  12.5 mg Oral Daily    cetirizine  5 mg Oral Daily

## 2025-06-05 NOTE — CM/SW NOTE
06/05/25 1200   CM/SW Referral Data   Referral Source Social Work (self-referral)   Reason for Referral Discharge planning   Informant Patient   Medical Hx   Does patient have an established PCP? Yes   Patient Info   Patient's Current Mental Status at Time of Assessment Alert;Oriented   Patient's Home Environment Townhouse   Number of Levels in Home 3   Number of Stair in Home 13 to get to 2nd level and 12 to get to 2nd level   Patient lives with Spouse/Significant other   Patient Status Prior to Admission   Independent with ADLs and Mobility Yes   Discharge Needs   Anticipated D/C needs Subacute rehab   Services Requested   Submitted to [facility] Yes     Sw sent tentative referrals for [facility] and HH via [employer], f2f entered and uploaded to [employer].    1236:SW initiated self-referral for discharge planning. Pt confirmed home address and PCP. Pt states she lives in a townhouse with her  with 3 levels with about 13 stairs in between each level. Pt reports she has a cane and a walker at home. Pt reports that prior to admission she was independent.    SW discuss [facility] vs. HH and pt would like to see a list of accepting facilities to inquire with her family. Pt stated she would like to stay close to home. SW to follow up    200-PASRR level 1 screen submitted and completed and uploaded to [email or web address] referral.      PLAN: [facility] vs. HH *pasrr/list/choice    SW/CM to remain available for support and/or discharge planning.    MS CodiW, LSW [id number]

## 2025-06-05 NOTE — PAYOR COMM NOTE
--------------  ADMISSION REVIEW--PLEASE NOTE THIS IS AN OBSERVATION STAY AT THIS TIME--PLEASE REMOVE THE INPATIENT REQUEST     Payor: [employer] AD[employer] PPO  Subscriber #:  [id number]  Authorization Number: [id number]    Admit date: 6/4/25  Admit time:  2:51 AM           History and Physical    H&P signed by Radha Arriaga MD at 6/4/2025  3:11 AM    Author: Radha Arriaga MD Service: Hospitalist Author Type: Physician   Filed: 6/4/2025  3:11 AM Date of Service: 6/4/2025  1:13 AM Status: Addendum   : Radha Arriaga MD (Physician)      [facility]  part of [facility]                                                                                                           History and Physical          History obtained with assistance of  at bedside.     Chief Complaint: S/p fall     Subjective:    Peg Garcia is a 70 year old female with history of HTN, back pain who presented to the ED following a fall.  She hit her head on concrete for with associated loss of consciousness.  Per   she tripped on sidewalk while walking, landing on her left left side with pain to left hip and leg.  Also right elbow.  Following LOC, she became confused.  At baseline she is 'a very smart woman, does all the paperwork'.  Patient reported some nausea earlier which has resolved.     CT head and left hip with no acute findings  Vital stable  Labs with potassium 2.8     History/Other:       Past Medical History:[Past Medical History]     [Past Medical History]   Back pain    Essential hypertension        Past Surgical History: [Past Surgical History]    [Past Surgical History]  History reviewed. No pertinent surgical history.     Social History:  reports that she has never smoked. She has never used smokeless tobacco. She reports that she does not drink alcohol and does not use drugs.     Family History: [Family History]    [Family History]  No family history on file.      Allergies: [Allergies]    [Allergies]  No Known Allergies     Medications:  [Medications Ordered Prior to Encounter]    [Medications Ordered Prior to Encounter]            Current Facility-Administered Medications on File Prior to Encounter   Medication Dose Route Frequency Provider Last Rate Last Admin    [COMPLETED] onabotulinumtoxinA (Botox) injection 200 Units  200 Units Injection Once     200 Units at 05/13/25 1407             Current Outpatient Medications on File Prior to Encounter   Medication Sig Dispense Refill    lidocaine 5 % External Patch Apply 1 patch to the left arm, and 1 patch to the left lower extremity every 24 hours for the next 10 to 14 days, then use as needed thereafter. (Patient not taking: Reported on 4/23/2025) 30 each 0    triamcinolone 0.1 % External Cream Apply 1 Application topically 2 (two) times daily as needed. 60 g 3    hydroCHLOROthiazide 12.5 MG Oral Tab Take 1 tablet (12.5 mg total) by mouth daily. 90 tablet 3    acetaminophen 500 MG Oral Tab Take 1 tablet (500 mg total) by mouth every 6 (six) hours as needed for Pain. 50 tablet 0    tiZANidine HCl 4 MG Oral Cap          amitriptyline 25 MG Oral Tab Take 2 tablets (50 mg total) by mouth nightly. 180 tablet 3    diazePAM 2 MG Oral Tab Take 1 tablet (2 mg total) by mouth 3 (three) times daily as needed (muscle spasm). 12 tablet 0    pregabalin (LYRICA) 150 MG Oral Cap Take 1 capsule (150 mg total) by mouth 2 (two) times daily. 60 capsule 0    levocetirizine 5 MG Oral Tab Take 1 tablet (5 mg total) by mouth every evening. 30 tablet 1        Review of Systems:   A comprehensive 14 point review of systems was completed.    Pertinent positives and negatives noted in the HPI.     Objective:      /61   Pulse 72   Temp 97.8 °F (36.6 °C) (Oral)   Resp 15   SpO2 98%   General: No acute distress.    HEENT: Normocephalic, atraumatic.  Neck: Supple.  Respiratory: Normal effort.  CTAB  Cardiovascular: S1, S2 regular.  Normal rate.   No murmur.   Abdomen: Soft, nontender distended.  Neurologic: Alert, oriented to person and place.  Had some difficulty with time.  No focal deficit.  Musculoskeletal: Left hip swelling and tenderness.  Mild left leg swelling but with tenderness.  Mild right elbow tenderness.  Extremities: No edema  Psychiatric: Cooperative     Results:       Labs:  Labs Last 24 Hours:                     BMP         CBC       Other      Na - Cl - BUN - Glu -     Hb 11.7     PTT - Procal -   K - CO2 - Cr -     WBC 5.7 >< .0   INR - CRP -   Renal Lytes Endo       Hct 35.5     Trop - D dim -   eGFR - Ca - POC Gluc  -       LFT     pBNP - Lactic -   eGFR AA - PO4 - A1c -     AST - APk - Prot -   LDL -       Mg - TSH -     ALT - T kym - Alb -               COVID-19 Lab Results     COVID-19        Lab Results   Component Value Date     COVID19 Not Detected 12/29/2021     COVID19 Not Detected 06/26/2020         Pro-Calcitonin  No results for input(s): \"PCT\" in the last 168 hours.     Cardiac  No results for input(s): \"TROP\", \"PBNP\" in the last 168 hours.     Creatinine Kinase  No results for input(s): \"CK\" in the last 168 hours.     Inflammatory Markers  No results for input(s): \"CRP\", \"KIM\", \"LDH\", \"DDIMER\" in the last 168 hours.     Imaging: Imaging data reviewed in Epic.     Assessment & Plan:    Confusion/AMS, possibly concussion  Fall with head trauma/LOC  - Per  at bedside, appears to be improving during my encounter  - Monitor  - Neurology consult as needed  - X-ray left leg, right shoulder     Hypokalemia  -May be due to HCTZ  - Replace per protocol     HTN  Back pain  - Home meds when reconciled     Quality:  DVT Prophylaxis: Heparin  CODE status: Full code  TILA: 1 to 2 days     IV antibiotics: None  IV pain medications: None  IV fluids: None  Diet: Cardiac  Monitoring: Mental state        Plan of care discussed with patient and family at bedside. Acknowledged understanding and agrees to plan. Also discussed with  the ED physician.     Moderate [facility]  Time spent on this admission - examining patient, obtaining history, reviewing previous medical records, going over test results/imaging and discussing plan of care. More than 50% of the time was spent in counseling and/or coordination of care related to mechanical fall, head trauma, altered mental status.   All questions answered.      Radha Arriaga MD  6/4/2025                        Signed by Radha Arriaga MD on 6/4/2025  3:11 AM   Revision History           MEDICATIONS ADMINISTERED IN LAST 1 DAY:  acetaminophen (Tylenol) tab 650 mg       Date Action Dose Route User    6/5/2025 1033 Given 650 mg Oral Liz Shahid RN    6/5/2025 0440 Given 650 mg Oral Chely Robert RN    6/4/2025 1830 Given 650 mg Oral Nella Lopez RN          cetirizine (ZyrTEC) tab 5 mg       Date Action Dose Route User    6/5/2025 1034 Given 5 mg Oral Liz Shahid RN          hydroCHLOROthiazide tab 12.5 mg       Date Action Dose Route User    6/5/2025 1034 Given 12.5 mg Oral Liz Shahid RN          naloxone (Narcan) 0.4 MG/ML injection 0.4 mg       Date Action Dose Route User    6/4/2025 1637 Given 0.4 mg Intravenous Nella Lopez RN            Vitals (last day)       Date/Time Temp Pulse Resp BP SpO2 Weight O2 Device O2 Flow Rate (L/min) [facility]    06/05/25 1026 98.1 °F (36.7 °C) 67 18 123/81 99 % -- None (Room air) -- GP    06/05/25 0438 97.5 °F (36.4 °C) 61 16 144/80 99 % -- None (Room air) -- WG    06/04/25 2043 97.6 °F (36.4 °C) 67 16 120/73 97 % -- None (Room air) -- WG    06/04/25 1419 97.6 °F (36.4 °C) 62 16 159/86 99 % -- None (Room air) -- MS    06/04/25 1418 -- 63 -- -- -- -- -- -- RW    06/04/25 1409 97.3 °F (36.3 °C) 64 16 142/82 100 % -- None (Room air) -- MS    06/04/25 1335 -- 63 -- 143/102 -- -- -- -- GF    06/04/25 0827 98 °F (36.7 °C) 64 16 119/75 98 % -- None (Room air) -- MS    06/04/25 0303 -- -- -- -- -- 193 lb 5.5 oz (87.7 kg) -- -- CL    06/04/25 0257 98  °F (36.7 °C) 69 18 140/77 100 % -- None (Room air) -- CL    06/04/25 0229 -- 69 16 136/73 99 % -- -- -- AL    06/04/25 0214 -- 67 14 122/66 99 % -- -- -- AL    06/04/25 0159 -- 70 16 134/72 98 % -- -- -- AL    06/04/25 0144 -- 71 14 122/71 98 % -- -- -- AL    06/04/25 0114 -- 74 19 142/78 98 % -- -- -- AL    06/04/25 0014 -- 72 15 146/61 98 % -- -- -- AL          CIWA Scores (since admission)       None

## 2025-06-05 NOTE — PLAN OF CARE
Problem: Patient Centered Care  Goal: Patient preferences are identified and integrated in the patient's plan of care  Description: Interventions:  - What would you like us to know as we care for you? From home with    - Provide timely, complete, and accurate information to patient/family  - Incorporate patient and family knowledge, values, beliefs, and cultural backgrounds into the planning and delivery of care  - Encourage patient/family to participate in care and decision-making at the level they choose  - Honor patient and family perspectives and choices  Outcome: Progressing     Problem: PAIN - ADULT  Goal: Verbalizes/displays adequate comfort level or patient's stated pain goal  Description: INTERVENTIONS:  - Encourage pt to monitor pain and request assistance  - Assess pain using appropriate pain scale  - Administer analgesics based on type and severity of pain and evaluate response  - Implement non-pharmacological measures as appropriate and evaluate response  - Consider cultural and social influences on pain and pain management  - Manage/alleviate anxiety  - Utilize distraction and/or relaxation techniques  - Monitor for opioid side effects  - Notify MD/LIP if interventions unsuccessful or patient reports new pain  - Anticipate increased pain with activity and pre-medicate as appropriate  Outcome: Progressing     Problem: SAFETY ADULT - FALL  Goal: Free from fall injury  Description: INTERVENTIONS:  - Assess pt frequently for physical needs  - Identify cognitive and physical deficits and behaviors that affect risk of falls.  - [place] fall precautions as indicated by assessment.  - Educate pt/family on patient safety including physical limitations  - Instruct pt to call for assistance with activity based on assessment  - Modify environment to reduce risk of injury  - Provide assistive devices as appropriate  - Consider OT/PT consult to assist with strengthening/mobility  - Encourage toileting  schedule  Outcome: Progressing     Problem: DISCHARGE PLANNING  Goal: Discharge to home or other facility with appropriate resources  Description: INTERVENTIONS:  - Identify barriers to discharge w/pt and caregiver  - Include patient/family/discharge partner in discharge planning  - Arrange for needed discharge resources and transportation as appropriate  - Identify discharge learning needs (meds, wound care, etc)  - Arrange for interpreters to assist at discharge as needed  - Consider post-discharge preferences of patient/family/discharge partner  - Complete POLST form as appropriate  - Assess patient's ability to be responsible for managing their own health  - Refer to Case Management Department for coordinating discharge planning if the patient needs post-hospital services based on physician/LIP order or complex needs related to functional status, cognitive ability or social support system  Outcome: Progressing     Problem: CARDIOVASCULAR - ADULT  Goal: Maintains optimal cardiac output and hemodynamic stability  Description: INTERVENTIONS:  - Monitor vital signs, rhythm, and trends  - Monitor for bleeding, hypotension and signs of decreased cardiac output  - Evaluate effectiveness of vasoactive medications to optimize hemodynamic stability  - Monitor arterial and/or venous puncture sites for bleeding and/or hematoma  - Assess quality of pulses, skin color and temperature  - Assess for signs of decreased coronary artery perfusion - ex. Angina  - Evaluate fluid balance, assess for edema, trend weights  Outcome: Progressing  Goal: Absence of cardiac arrhythmias or at baseline  Description: INTERVENTIONS:  - Continuous cardiac monitoring, monitor vital signs, obtain 12 lead EKG if indicated  - Evaluate effectiveness of antiarrhythmic and heart rate control medications as ordered  - Initiate emergency measures for life threatening arrhythmias  - Monitor electrolytes and administer replacement therapy as  ordered  Outcome: Progressing     Problem: Impaired Functional Mobility  Goal: Achieve highest/safest level of mobility/gait  Description: Interventions:  - Assess patient's functional ability and stability  - Promote increasing activity/tolerance for mobility and gait  - Educate and engage patient/family in tolerated activity level and precautions    Outcome: Progressing     Problem: Impaired Activities of Daily Living  Goal: Achieve highest/safest level of independence in self care  Description: Interventions:  - Assess ability and encourage patient to participate in ADLs to maximize function  - Promote sitting position while performing ADLs such as feeding, grooming, and bathing  - Educate and encourage patient/family in tolerated functional activity level and precautions during self-care    Outcome: Progressing

## 2025-06-06 ENCOUNTER — APPOINTMENT (OUTPATIENT)
Dept: GENERAL RADIOLOGY | Facility: HOSPITAL | Age: 70
End: 2025-06-06
Attending: HOSPITALIST
Payer: COMMERCIAL

## 2025-06-06 PROCEDURE — 73090 X-RAY EXAM OF FOREARM: CPT | Performed by: HOSPITALIST

## 2025-06-06 PROCEDURE — 99233 SBSQ HOSP IP/OBS HIGH 50: CPT | Performed by: HOSPITALIST

## 2025-06-06 RX ORDER — SODIUM PHOSPHATE, DIBASIC AND SODIUM PHOSPHATE, MONOBASIC 7; 19 G/230ML; G/230ML
1 ENEMA RECTAL ONCE AS NEEDED
Status: DISCONTINUED | OUTPATIENT
Start: 2025-06-06 | End: 2025-06-07

## 2025-06-06 RX ORDER — POLYETHYLENE GLYCOL 3350 17 G/17G
17 POWDER, FOR SOLUTION ORAL DAILY PRN
Status: DISCONTINUED | OUTPATIENT
Start: 2025-06-06 | End: 2025-06-07

## 2025-06-06 RX ORDER — BISACODYL 10 MG
10 SUPPOSITORY, RECTAL RECTAL
Status: DISCONTINUED | OUTPATIENT
Start: 2025-06-06 | End: 2025-06-07

## 2025-06-06 RX ORDER — DOCUSATE SODIUM 100 MG/1
100 CAPSULE, LIQUID FILLED ORAL 2 TIMES DAILY
Status: DISCONTINUED | OUTPATIENT
Start: 2025-06-06 | End: 2025-06-07

## 2025-06-06 NOTE — PLAN OF CARE
Safety precautions in place. Call light within reach. Patient care needs addressed.     Problem: Patient Centered Care  Goal: Patient preferences are identified and integrated in the patient's plan of care  Description: Interventions:  - Provide timely, complete, and accurate information to patient/family  - Incorporate patient and family knowledge, values, beliefs, and cultural backgrounds into the planning and delivery of care  - Encourage patient/family to participate in care and decision-making at the level they choose  - Honor patient and family perspectives and choices  Outcome: Progressing     Problem: PAIN - ADULT  Goal: Verbalizes/displays adequate comfort level or patient's stated pain goal  Description: INTERVENTIONS:  - Encourage pt to monitor pain and request assistance  - Assess pain using appropriate pain scale  - Administer analgesics based on type and severity of pain and evaluate response  - Implement non-pharmacological measures as appropriate and evaluate response  - Consider cultural and social influences on pain and pain management  - Manage/alleviate anxiety  - Utilize distraction and/or relaxation techniques  - Monitor for opioid side effects  - Notify MD/LIP if interventions unsuccessful or patient reports new pain  - Anticipate increased pain with activity and pre-medicate as appropriate  Outcome: Progressing     Problem: SAFETY ADULT - FALL  Goal: Free from fall injury  Description: INTERVENTIONS:  - Assess pt frequently for physical needs  - Identify cognitive and physical deficits and behaviors that affect risk of falls.  - [place] fall precautions as indicated by assessment.  - Educate pt/family on patient safety including physical limitations  - Instruct pt to call for assistance with activity based on assessment  - Modify environment to reduce risk of injury  - Provide assistive devices as appropriate  - Consider OT/PT consult to assist with strengthening/mobility  - Encourage  toileting schedule  Outcome: Progressing     Problem: DISCHARGE PLANNING  Goal: Discharge to home or other facility with appropriate resources  Description: INTERVENTIONS:  - Identify barriers to discharge w/pt and caregiver  - Include patient/family/discharge partner in discharge planning  - Arrange for needed discharge resources and transportation as appropriate  - Identify discharge learning needs (meds, wound care, etc)  - Arrange for interpreters to assist at discharge as needed  - Consider post-discharge preferences of patient/family/discharge partner  - Complete POLST form as appropriate  - Assess patient's ability to be responsible for managing their own health  - Refer to Case Management Department for coordinating discharge planning if the patient needs post-hospital services based on physician/LIP order or complex needs related to functional status, cognitive ability or social support system  Outcome: Progressing     Problem: CARDIOVASCULAR - ADULT  Goal: Maintains optimal cardiac output and hemodynamic stability  Description: INTERVENTIONS:  - Monitor vital signs, rhythm, and trends  - Monitor for bleeding, hypotension and signs of decreased cardiac output  - Evaluate effectiveness of vasoactive medications to optimize hemodynamic stability  - Monitor arterial and/or venous puncture sites for bleeding and/or hematoma  - Assess quality of pulses, skin color and temperature  - Assess for signs of decreased coronary artery perfusion - ex. Angina  - Evaluate fluid balance, assess for edema, trend weights  Outcome: Progressing  Goal: Absence of cardiac arrhythmias or at baseline  Description: INTERVENTIONS:  - Continuous cardiac monitoring, monitor vital signs, obtain 12 lead EKG if indicated  - Evaluate effectiveness of antiarrhythmic and heart rate control medications as ordered  - Initiate emergency measures for life threatening arrhythmias  - Monitor electrolytes and administer replacement therapy as  ordered  Outcome: Progressing     Problem: Impaired Functional Mobility  Goal: Achieve highest/safest level of mobility/gait  Description: Interventions:  - Assess patient's functional ability and stability  - Promote increasing activity/tolerance for mobility and gait  - Educate and engage patient/family in tolerated activity level and precautions  - Recommend use of  RW for transfers and ambulation  Outcome: Progressing     Problem: Impaired Activities of Daily Living  Goal: Achieve highest/safest level of independence in self care  Description: Interventions:  - Assess ability and encourage patient to participate in ADLs to maximize function  - Promote sitting position while performing ADLs such as feeding, grooming, and bathing  - Educate and encourage patient/family in tolerated functional activity level and precautions during self-care  Outcome: Progressing

## 2025-06-06 NOTE — PLAN OF CARE
Problem: Patient Centered Care  Goal: Patient preferences are identified and integrated in the patient's plan of care  Description: Interventions:  - What would you like us to know as we care for you?   - Provide timely, complete, and accurate information to patient/family  - Incorporate patient and family knowledge, values, beliefs, and cultural backgrounds into the planning and delivery of care  - Encourage patient/family to participate in care and decision-making at the level they choose  - Honor patient and family perspectives and choices  Outcome: Progressing     Problem: Patient/Family Goals  Goal: Patient/Family Long Term Goal  Description: Patient's Long Term Goal:     Interventions:  -   - See additional Care Plan goals for specific interventions  Outcome: Progressing  Goal: Patient/Family Short Term Goal  Description: Patient's Short Term Goal:     Interventions:   -   - See additional Care Plan goals for specific interventions  Outcome: Progressing     Problem: PAIN - ADULT  Goal: Verbalizes/displays adequate comfort level or patient's stated pain goal  Description: INTERVENTIONS:  - Encourage pt to monitor pain and request assistance  - Assess pain using appropriate pain scale  - Administer analgesics based on type and severity of pain and evaluate response  - Implement non-pharmacological measures as appropriate and evaluate response  - Consider cultural and social influences on pain and pain management  - Manage/alleviate anxiety  - Utilize distraction and/or relaxation techniques  - Monitor for opioid side effects  - Notify MD/LIP if interventions unsuccessful or patient reports new pain  - Anticipate increased pain with activity and pre-medicate as appropriate  Outcome: Progressing     Problem: SAFETY ADULT - FALL  Goal: Free from fall injury  Description: INTERVENTIONS:  - Assess pt frequently for physical needs  - Identify cognitive and physical deficits and behaviors that affect risk of  falls.  - [facility] fall precautions as indicated by assessment.  - Educate pt/family on patient safety including physical limitations  - Instruct pt to call for assistance with activity based on assessment  - Modify environment to reduce risk of injury  - Provide assistive devices as appropriate  - Consider OT/PT consult to assist with strengthening/mobility  - Encourage toileting schedule  Outcome: Progressing     Problem: DISCHARGE PLANNING  Goal: Discharge to home or other facility with appropriate resources  Description: INTERVENTIONS:  - Identify barriers to discharge w/pt and caregiver  - Include patient/family/discharge partner in discharge planning  - Arrange for needed discharge resources and transportation as appropriate  - Identify discharge learning needs (meds, wound care, etc)  - Arrange for interpreters to assist at discharge as needed  - Consider post-discharge preferences of patient/family/discharge partner  - Complete POLST form as appropriate  - Assess patient's ability to be responsible for managing their own health  - Refer to Case Management Department for coordinating discharge planning if the patient needs post-hospital services based on physician/LIP order or complex needs related to functional status, cognitive ability or social support system  Outcome: Progressing     Problem: CARDIOVASCULAR - ADULT  Goal: Maintains optimal cardiac output and hemodynamic stability  Description: INTERVENTIONS:  - Monitor vital signs, rhythm, and trends  - Monitor for bleeding, hypotension and signs of decreased cardiac output  - Evaluate effectiveness of vasoactive medications to optimize hemodynamic stability  - Monitor arterial and/or venous puncture sites for bleeding and/or hematoma  - Assess quality of pulses, skin color and temperature  - Assess for signs of decreased coronary artery perfusion - ex. Angina  - Evaluate fluid balance, assess for edema, trend weights  Outcome: Progressing  Goal: Absence  of cardiac arrhythmias or at baseline  Description: INTERVENTIONS:  - Continuous cardiac monitoring, monitor vital signs, obtain 12 lead EKG if indicated  - Evaluate effectiveness of antiarrhythmic and heart rate control medications as ordered  - Initiate emergency measures for life threatening arrhythmias  - Monitor electrolytes and administer replacement therapy as ordered  Outcome: Progressing     Problem: Impaired Functional Mobility  Goal: Achieve highest/safest level of mobility/gait  Description: Interventions:  - Assess patient's functional ability and stability  - Promote increasing activity/tolerance for mobility and gait  - Educate and engage patient/family in tolerated activity level and precautions    Outcome: Progressing     Problem: Impaired Activities of Daily Living  Goal: Achieve highest/safest level of independence in self care  Description: Interventions:  - Assess ability and encourage patient to participate in ADLs to maximize function  - Promote sitting position while performing ADLs such as feeding, grooming, and bathing  - Educate and encourage patient/family in tolerated functional activity level and precautions during self-care    Outcome: Progressing

## 2025-06-06 NOTE — PLAN OF CARE
Problem: Patient Centered Care  Goal: Patient preferences are identified and integrated in the patient's plan of care  Description: Interventions:  - What would you like us to know as we care for you?   - Provide timely, complete, and accurate information to patient/family  - Incorporate patient and family knowledge, values, beliefs, and cultural backgrounds into the planning and delivery of care  - Encourage patient/family to participate in care and decision-making at the level they choose  - Honor patient and family perspectives and choices  Outcome: Progressing     Problem: Patient/Family Goals  Goal: Patient/Family Long Term Goal  Description: Patient's Long Term Goal:     Interventions:  -   - See additional Care Plan goals for specific interventions  Outcome: Progressing  Goal: Patient/Family Short Term Goal  Description: Patient's Short Term Goal:     Interventions:   -   - See additional Care Plan goals for specific interventions  Outcome: Progressing     Problem: PAIN - ADULT  Goal: Verbalizes/displays adequate comfort level or patient's stated pain goal  Description: INTERVENTIONS:  - Encourage pt to monitor pain and request assistance  - Assess pain using appropriate pain scale  - Administer analgesics based on type and severity of pain and evaluate response  - Implement non-pharmacological measures as appropriate and evaluate response  - Consider cultural and social influences on pain and pain management  - Manage/alleviate anxiety  - Utilize distraction and/or relaxation techniques  - Monitor for opioid side effects  - Notify MD/LIP if interventions unsuccessful or patient reports new pain  - Anticipate increased pain with activity and pre-medicate as appropriate  Outcome: Progressing     Problem: SAFETY ADULT - FALL  Goal: Free from fall injury  Description: INTERVENTIONS:  - Assess pt frequently for physical needs  - Identify cognitive and physical deficits and behaviors that affect risk of  falls.  - [place] fall precautions as indicated by assessment.  - Educate pt/family on patient safety including physical limitations  - Instruct pt to call for assistance with activity based on assessment  - Modify environment to reduce risk of injury  - Provide assistive devices as appropriate  - Consider OT/PT consult to assist with strengthening/mobility  - Encourage toileting schedule  Outcome: Progressing     Problem: DISCHARGE PLANNING  Goal: Discharge to home or other facility with appropriate resources  Description: INTERVENTIONS:  - Identify barriers to discharge w/pt and caregiver  - Include patient/family/discharge partner in discharge planning  - Arrange for needed discharge resources and transportation as appropriate  - Identify discharge learning needs (meds, wound care, etc)  - Arrange for interpreters to assist at discharge as needed  - Consider post-discharge preferences of patient/family/discharge partner  - Complete POLST form as appropriate  - Assess patient's ability to be responsible for managing their own health  - Refer to Case Management Department for coordinating discharge planning if the patient needs post-hospital services based on physician/LIP order or complex needs related to functional status, cognitive ability or social support system  Outcome: Progressing     Problem: CARDIOVASCULAR - ADULT  Goal: Maintains optimal cardiac output and hemodynamic stability  Description: INTERVENTIONS:  - Monitor vital signs, rhythm, and trends  - Monitor for bleeding, hypotension and signs of decreased cardiac output  - Evaluate effectiveness of vasoactive medications to optimize hemodynamic stability  - Monitor arterial and/or venous puncture sites for bleeding and/or hematoma  - Assess quality of pulses, skin color and temperature  - Assess for signs of decreased coronary artery perfusion - ex. Angina  - Evaluate fluid balance, assess for edema, trend weights  Outcome: Progressing  Goal: Absence  of cardiac arrhythmias or at baseline  Description: INTERVENTIONS:  - Continuous cardiac monitoring, monitor vital signs, obtain 12 lead EKG if indicated  - Evaluate effectiveness of antiarrhythmic and heart rate control medications as ordered  - Initiate emergency measures for life threatening arrhythmias  - Monitor electrolytes and administer replacement therapy as ordered  Outcome: Progressing     Problem: Impaired Functional Mobility  Goal: Achieve highest/safest level of mobility/gait  Description: Interventions:  - Assess patient's functional ability and stability  - Promote increasing activity/tolerance for mobility and gait  - Educate and engage patient/family in tolerated activity level and precautions    Outcome: Progressing     Problem: Impaired Activities of Daily Living  Goal: Achieve highest/safest level of independence in self care  Description: Interventions:  - Assess ability and encourage patient to participate in ADLs to maximize function  - Promote sitting position while performing ADLs such as feeding, grooming, and bathing  - Educate and encourage patient/family in tolerated functional activity level and precautions during self-care    Outcome: Progressing

## 2025-06-06 NOTE — CM/SW NOTE
Sw met with pt to provide list for [facility] vs HH. Pt asked for some time to think about her options. SW to follow.    PLAN: [facility] vs. Home w/ HH. *choice    SW/CM to remain available for support and/or discharge planning.    Codi MSW, LSW [id number]

## 2025-06-06 NOTE — PROGRESS NOTES
Progress Note     Peg Garcia Patient Status:  Observation    1/15/1955 MRN [id number]   Location [facility] 5SW/SE Attending Kerry Velasco MD   Hosp Day # 1 PCP Luis Eduardo Chavez DO     Chief Complaint: patient presented with   Chief Complaint   Patient presents with    Fall       Subjective:     S: Patient c/o right arm pain, not able to lift it much.  No chest pain or sob.  No n/v.  Awaiting PT.     Review of Systems:   10 point ROS completed and was negative, except for pertinent positive and negatives stated in subjective.    Objective:   Vital signs:  Temp:  [97.5 °F (36.4 °C)-98.4 °F (36.9 °C)] 97.5 °F (36.4 °C)  Pulse:  [63-76] 63  Resp:  [17-18] 17  BP: (115-140)/(60-73) 134/72  SpO2:  [97 %-98 %] 97 %    Wt Readings from Last 6 Encounters:   25 193 lb 5.5 oz (87.7 kg)   25 202 lb (91.6 kg)   25 195 lb (88.5 kg)   24 197 lb (89.4 kg)   24 205 lb 3.2 oz (93.1 kg)   24 190 lb (86.2 kg)         Physical Exam:      Gen: No acute distress  Pulm: Lungs clear, normal respiratory effort  CV: Heart with regular rate and rhythm  Abd: Abdomen soft, nontender, nondistended, bowel sounds present  Neuro: No acute focal deficits  MSK: moves extremities; decreased rom in the rt arm  Skin: Warm and dry  Psych: Normal affect  Ext: no c/c/e      Results:   Diagnostic Data:      Labs:    Labs Last 24 Hours:   BMP     CBC    Other     Na - Cl - BUN - Glu -   Hb -   PTT - Procal -   K - CO2 - Cr -   WBC - >< PLT -  INR - CRP -   Renal Lytes Endo    Hct -   Trop - D dim -   eGFR - Ca - POC Gluc  -    LFT   pBNP - Lactic -   eGFR AA - PO4 - A1c -   AST - APk - Prot -  LDL -     Mg - TSH -   ALT - T kym - Alb -        COVID-19 Lab Results    COVID-19  Lab Results   Component Value Date    COVID19 Not Detected 2021    COVID19 Not Detected 2020       Pro-Calcitonin  No results for input(s): \"PCT\" in the last 168 hours.    Cardiac  No results for input(s): \"TROP\",  \"PBNP\" in the last 168 hours.    Creatinine Kinase  No results for input(s): \"CK\" in the last 168 hours.    Inflammatory Markers  No results for input(s): \"CRP\", \"KIM\", \"LDH\", \"DDIMER\" in the last 168 hours.    Imaging: Imaging data reviewed in Epic.    Medications: Scheduled Medications[1]    Assessment & Plan:   ASSESSMENT / PLAN:           Confusion/AMS, possibly concussion  Fall with head trauma/LOC  - Improving  - Neurology consult - recommend holding drowsy meds   - X-ray left leg, right shoulder-reviewed no Fx - will repeat rt arm xray today as she is barely able to move it  - PT/OT pending     HTN  Back pain  - Home meds when reconciled     Quality:  DVT Prophylaxis: Heparin  CODE status: Full code        Quality:  DVT Prophylaxis: scds  CODE status: full   DISPO: pending clinical improvement.   Estimated date of discharge: To be determined  Discharge is dependent on: Improved clinical status  At this point Patient is expected to be discharge to: Home versus rehab      Plan of care discussed with Patient and RN.     Coordinated care with providers and counseling re: treatment plan and workup     Kerry Velasco MD    Supplementary Documentation:         **Certification      PHYSICIAN Certification of Need for Inpatient Hospitalization - Initial Certification    Patient will require inpatient services that will reasonably be expected to span two midnight's based on the clinical documentation in H+P.   Based on patients current state of illness, I anticipate that, after discharge, patient will require TBD.             I personally reviewed the available laboratories, imaging including operative report. I discussed/will discuss the case with patient and her nurse. I ordered laboratories studies. I adjusted medications including not applicable today. Medical decision making high, risk is high.     >55min spent, >50% spent counseling and coordinating care in the form of educating pt/family and d/w consultants and  staff. Most of the time spent discussing the above plan.               [1]    heparin  5,000 Units Subcutaneous Q8H SHERRY    amitriptyline  50 mg Oral Nightly    hydroCHLOROthiazide  12.5 mg Oral Daily    cetirizine  5 mg Oral Daily

## 2025-06-07 VITALS
SYSTOLIC BLOOD PRESSURE: 164 MMHG | WEIGHT: 193.31 LBS | HEART RATE: 73 BPM | BODY MASS INDEX: 32 KG/M2 | DIASTOLIC BLOOD PRESSURE: 73 MMHG | OXYGEN SATURATION: 92 % | RESPIRATION RATE: 16 BRPM | TEMPERATURE: 98 F

## 2025-06-07 PROCEDURE — 99239 HOSP IP/OBS DSCHRG MGMT >30: CPT | Performed by: HOSPITALIST

## 2025-06-07 NOTE — PLAN OF CARE
Problem: Patient Centered Care  Goal: Patient preferences are identified and integrated in the patient's plan of care  Description: Interventions:  - What would you like us to know as we care for you?   - Provide timely, complete, and accurate information to patient/family  - Incorporate patient and family knowledge, values, beliefs, and cultural backgrounds into the planning and delivery of care  - Encourage patient/family to participate in care and decision-making at the level they choose  - Honor patient and family perspectives and choices  Outcome: Progressing     Problem: Patient/Family Goals  Goal: Patient/Family Long Term Goal  Description: Patient's Long Term Goal:     Interventions:  -   - See additional Care Plan goals for specific interventions  Outcome: Progressing  Goal: Patient/Family Short Term Goal  Description: Patient's Short Term Goal:     Interventions:   -   - See additional Care Plan goals for specific interventions  Outcome: Progressing     Problem: PAIN - ADULT  Goal: Verbalizes/displays adequate comfort level or patient's stated pain goal  Description: INTERVENTIONS:  - Encourage pt to monitor pain and request assistance  - Assess pain using appropriate pain scale  - Administer analgesics based on type and severity of pain and evaluate response  - Implement non-pharmacological measures as appropriate and evaluate response  - Consider cultural and social influences on pain and pain management  - Manage/alleviate anxiety  - Utilize distraction and/or relaxation techniques  - Monitor for opioid side effects  - Notify MD/LIP if interventions unsuccessful or patient reports new pain  - Anticipate increased pain with activity and pre-medicate as appropriate  Outcome: Progressing     Problem: SAFETY ADULT - FALL  Goal: Free from fall injury  Description: INTERVENTIONS:  - Assess pt frequently for physical needs  - Identify cognitive and physical deficits and behaviors that affect risk of  falls.  - [place] fall precautions as indicated by assessment.  - Educate pt/family on patient safety including physical limitations  - Instruct pt to call for assistance with activity based on assessment  - Modify environment to reduce risk of injury  - Provide assistive devices as appropriate  - Consider OT/PT consult to assist with strengthening/mobility  - Encourage toileting schedule  Outcome: Progressing     Problem: DISCHARGE PLANNING  Goal: Discharge to home or other facility with appropriate resources  Description: INTERVENTIONS:  - Identify barriers to discharge w/pt and caregiver  - Include patient/family/discharge partner in discharge planning  - Arrange for needed discharge resources and transportation as appropriate  - Identify discharge learning needs (meds, wound care, etc)  - Arrange for interpreters to assist at discharge as needed  - Consider post-discharge preferences of patient/family/discharge partner  - Complete POLST form as appropriate  - Assess patient's ability to be responsible for managing their own health  - Refer to Case Management Department for coordinating discharge planning if the patient needs post-hospital services based on physician/LIP order or complex needs related to functional status, cognitive ability or social support system  Outcome: Progressing     Problem: CARDIOVASCULAR - ADULT  Goal: Maintains optimal cardiac output and hemodynamic stability  Description: INTERVENTIONS:  - Monitor vital signs, rhythm, and trends  - Monitor for bleeding, hypotension and signs of decreased cardiac output  - Evaluate effectiveness of vasoactive medications to optimize hemodynamic stability  - Monitor arterial and/or venous puncture sites for bleeding and/or hematoma  - Assess quality of pulses, skin color and temperature  - Assess for signs of decreased coronary artery perfusion - ex. Angina  - Evaluate fluid balance, assess for edema, trend weights  Outcome: Progressing  Goal: Absence  of cardiac arrhythmias or at baseline  Description: INTERVENTIONS:  - Continuous cardiac monitoring, monitor vital signs, obtain 12 lead EKG if indicated  - Evaluate effectiveness of antiarrhythmic and heart rate control medications as ordered  - Initiate emergency measures for life threatening arrhythmias  - Monitor electrolytes and administer replacement therapy as ordered  Outcome: Progressing     Problem: Impaired Functional Mobility  Goal: Achieve highest/safest level of mobility/gait  Description: Interventions:  - Assess patient's functional ability and stability  - Promote increasing activity/tolerance for mobility and gait  - Educate and engage patient/family in tolerated activity level and precautions    Outcome: Progressing     Problem: Impaired Activities of Daily Living  Goal: Achieve highest/safest level of independence in self care  Description: Interventions:  - Assess ability and encourage patient to participate in ADLs to maximize function  - Promote sitting position while performing ADLs such as feeding, grooming, and bathing  - Educate and encourage patient/family in tolerated functional activity level and precautions during self-care  Outcome: Progressing

## 2025-06-07 NOTE — DISCHARGE SUMMARY
[facility]  part of [facility]    Discharge Summary    Peg Garcia Patient Status:  Observation    1/15/1955 MRN [id number]   Location [facility] 5SW/SE Attending Jessica Mccormick MD   Hosp Day # 1 PCP Luis Eduardo Chavez DO     Date of Admission: 6/3/2025      Date of Discharge: 25      Admitting Diagnosis: Hypokalemia [E87.6]  Altered level of consciousness [R40.4]  Contusion of left hip, initial encounter [S70.02XA]  Fall, initial encounter [W19.XXXA]  Concussion with unknown loss of consciousness status, initial encounter [S06.0XAA]    Hospital Discharge Diagnoses: Concussion    Lace+ Score: 42  59-90 High Risk  29-58 Medium Risk  0-28   Low Risk.    TCM Follow-Up Recommendation:  LACE 29-58: Moderate Risk of readmission after discharge from the hospital.          Problem List: Problem List[1]      Physical Exam:     Gen: No acute distress  Pulm: Lungs clear, normal respiratory effort  CV: Heart with regular rate and rhythm  Abd: Abdomen soft, nontender, nondistended, bowel sounds present  Neuro: No acute focal deficits  MSK: moves extremities  Skin: Warm and dry  Psych: Normal affect  Ext: no c/c/e      History of Present Illness: Per Admit MD    Peg Garcia is a 70 year old female with history of HTN, back pain who presented to the ED following a fall.  She hit her head on concrete for with associated loss of consciousness.  Per   she tripped on sidewalk while walking, landing on her left left side with pain to left hip and leg.  Also right elbow.  Following LOC, she became confused.  At baseline she is 'a very smart woman, does all the paperwork'.  Patient reported some nausea earlier which has resolved.     CT head and left hip with no acute findings  Vital stable  Labs with potassium 2.8    Hospital Course:     Concussion  Post concussive syndrome  Fall with head trauma/LOC  - Improved  - Neurology consulted - appreciate recs, now  resolved  - X-ray left leg, right shoulder-reviewed no Fx  - PT/OT evaluated patient     HTN  Back pain  - Home meds when reconciled    Discharge Condition: Stable    Discharge Medications:      Discharge Medications        CONTINUE taking these medications        Instructions Prescription details   acetaminophen 500 MG Tabs  Commonly known as: Tylenol Extra Strength      Take 1 tablet (500 mg total) by mouth every 6 (six) hours as needed for Pain.   Quantity: 50 tablet  Refills: 0     amitriptyline 25 MG Tabs  Commonly known as: Elavil      Take 2 tablets (50 mg total) by mouth nightly.   Quantity: 180 tablet  Refills: 3     diazePAM 2 MG Tabs  Commonly known as: Valium      Take 1 tablet (2 mg total) by mouth 3 (three) times daily as needed (muscle spasm).   Quantity: 12 tablet  Refills: 0     hydroCHLOROthiazide 12.5 MG Tabs      Take 1 tablet (12.5 mg total) by mouth daily.   Quantity: 90 tablet  Refills: 3     levocetirizine 5 MG Tabs  Commonly known as: Xyzal      Take 1 tablet (5 mg total) by mouth every evening.   Quantity: 30 tablet  Refills: 1     lidocaine 5 % Ptch  Commonly known as: Lidoderm      Apply 1 patch to the left arm, and 1 patch to the left lower extremity every 24 hours for the next 10 to 14 days, then use as needed thereafter.   Quantity: 30 each  Refills: 0     pregabalin 150 MG Caps  Commonly known as: Lyrica      Take 1 capsule (150 mg total) by mouth 2 (two) times daily.   Quantity: 60 capsule  Refills: 0     tiZANidine HCl 4 MG Caps  Commonly known as: ZANAFLEX       Refills: 0     triamcinolone 0.1 % Crea  Commonly known as: Kenalog      Apply 1 Application topically 2 (two) times daily as needed.   Quantity: 60 g  Refills: 3                  Jessica Mccormick MD  6/7/2025  10:08 AM    Greater than 30 minutes spent on preparation and coordination of this discharge       [1]   Patient Active Problem List  Diagnosis    Sleep disturbance    Spinal stenosis at L4-L5 level     Degenerative disc disease, lumbar    Chronic left-sided lumbar radiculopathy    Hypertension    Right lumbar radiculopathy    Genital herpes simplex    Severe obesity (BMI 35.0-39.9) with comorbidity (HCC)    CKD (chronic kidney disease) stage 3, GFR 30-59 ml/min ([facility])    Fall, initial encounter    Altered level of consciousness    Concussion with unknown loss of consciousness status, initial encounter    Contusion of left hip, initial encounter    Hypokalemia

## 2025-06-07 NOTE — CM/SW NOTE
06/07/25 1000   Discharge disposition   Expected discharge disposition Home or Self   Post Acute Care Provider   ([facility])   Discharge transportation Private car     Patient received MDO for discharge. Patient is agreeable to hh services. [facility] is the only accepting HH at this time. Information placed in AVS. Patient has a ride home. [facility] informed that patient is discharging today via aidin.       SW/CM to remain available for support and/or discharge planning.     Ambika Ulloa, MSW, LSW   x [phone]

## 2025-06-07 NOTE — DISCHARGE INSTRUCTIONS
Please follow up with PCP within 1-2 weeks of discharge.  Please call for appointment.    [facility]  Phone: [phone]  Fax: [phone]

## 2025-06-19 ENCOUNTER — TELEPHONE (OUTPATIENT)
Dept: FAMILY MEDICINE CLINIC | Facility: CLINIC | Age: 70
End: 2025-06-19

## 2025-06-19 NOTE — TELEPHONE ENCOUNTER
Patient called (identified name and ),   Fell on concrete and hit head, was hospitalized , also diagnosed with hypokalemia, discharged 2025.  Is seeking refills for potassium and states she was supposed to be on a blood thinner.  Per discharge summary there is no mention of potassium supplement or a blood thinner.  Patient needs hospital follow up appointment but there are no openings.  States her left leg is still hard, has a knot on it, ankle is swollen, and she has leg and back pain.  Dr Chavez, please advise if you can add patient to your schedule?

## 2025-06-23 NOTE — TELEPHONE ENCOUNTER
Attempted to call patient. Left message for patient to return call. Mychart message sent as well.

## 2025-07-18 ENCOUNTER — TELEPHONE (OUTPATIENT)
Dept: FAMILY MEDICINE CLINIC | Facility: CLINIC | Age: 70
End: 2025-07-18

## 2025-07-18 NOTE — TELEPHONE ENCOUNTER
Amarilys calling from [facility] in [place].   She is asking what insurance we bill for visits  Reviewed insurances on file, they are trying to call patient but she is not answering calls    Advised of billing number but also referral dept because they obtained Auth but when they bill plan they are being denied claims.

## 2025-07-21 ENCOUNTER — MED REC SCAN ONLY (OUTPATIENT)
Dept: FAMILY MEDICINE CLINIC | Facility: CLINIC | Age: 70
End: 2025-07-21

## 2025-07-21 NOTE — TELEPHONE ENCOUNTER
Amarilys is requesting a referral for services of 07/08 to present. Patient is having patient and ot services. This is for home health.       Fax: [phone]

## 2025-08-13 ENCOUNTER — TELEPHONE (OUTPATIENT)
Dept: FAMILY MEDICINE CLINIC | Facility: CLINIC | Age: 70
End: 2025-08-13

## 2025-08-13 ENCOUNTER — OFFICE VISIT (OUTPATIENT)
Dept: NEUROLOGY | Facility: CLINIC | Age: 70
End: 2025-08-13

## 2025-08-13 DIAGNOSIS — G43.709 CHRONIC MIGRAINE W/O AURA W/O STATUS MIGRAINOSUS, NOT INTRACTABLE: Primary | ICD-10-CM

## 2025-08-13 DIAGNOSIS — M50.30 DEGENERATIVE DISC DISEASE, CERVICAL: ICD-10-CM

## 2025-08-13 DIAGNOSIS — M54.16 LUMBAR RADICULOPATHY: ICD-10-CM

## 2025-08-13 DIAGNOSIS — M48.02 CERVICAL STENOSIS OF SPINAL CANAL: ICD-10-CM

## 2025-08-13 DIAGNOSIS — M51.369 DEGENERATION OF INTERVERTEBRAL DISC OF LUMBAR REGION, UNSPECIFIED WHETHER PAIN PRESENT: Primary | ICD-10-CM

## 2025-08-13 PROCEDURE — 64615 CHEMODENERV MUSC MIGRAINE: CPT | Performed by: OTHER
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
## (undated) NOTE — LETTER
AUTHORIZATION FOR SURGICAL OPERATION OR OTHER PROCEDURE    1. I hereby authorize Dr. Marshall Bueno and the [facility] staff assigned to my case to perform the following operation and/or procedure at the [facility]:  Botox 200 units BUY&BILL   _______________________________________________________________________________________________    Chronic migraine w/o aura w/o status migrainosus, not intractable   _______________________________________________________________________________________________    2.  My physician has explained the nature and purpose of the operation or other procedure, possible alternative methods of treatment, the risks involved, and the possibility of complication to me.  I acknowledge that no guarantee has been made as to the result that may be obtained.  3.  I recognize that, during the course of this operation, or other procedure, unforseen conditions may necessitate additional or different procedure than those listed above.  I, therefore, further authorize and request that the above named physician, his/her physician assistants or designees perform such procedures as are, in his/her professional opinion, necessary and desirable.  4.  Any tissue or organs removed in the operation or other procedure may be disposed of by and at the discretion of the [facility] staff and [facility].  5.  I understand that in the event of a medical emergency, I will be transported by local paramedics to [facility] or other hospital emergency department.  6.  I certify that I have read and fully understand the above consent to operation and/or other procedure.    7.  I acknowledge that my physician has explained sedation/analgesia administration to me including the risks and benefits.  I consent to the administration of sedation/analgesia as may be necessary or desirable in the judgement of my physician.    Witness signature:  ___________________________________________________ Date:  ______/______/_____                    Time:  ________ A.M.  P.M.       Patient Name: Peg Garcia 1/15/1955 [id number]       Patient signature:  ___________________________________________________      Statement of Physician  My signature below affirms that prior to the time of the procedure, I have explained to the patient and/or his/her guardian, the risks and benefits involved in the proposed treatment and any reasonable alternative to the proposed treatment.  I have also explained the risks and benefits involved in the refusal of the proposed treatment and have answered the patient's questions.                        Date:  ______/______/_______  Provider                      Signature:  __________________________________________________________       Time:  ___________ A.M    P.M.
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
## (undated) NOTE — LETTER
AUTHORIZATION FOR SURGICAL OPERATION OR OTHER PROCEDURE    1. I hereby authorize Dr. Jessica Manzano  and the [facility] staff assigned to my case to perform the following operation and/or procedure at the [facility]:    Occipital nerve blocks     2. My physician has explained the nature and purpose of the operation or other procedure, possible alternative methods of treatment, the risks involved, and the possibility of complication to me. I acknowledge that no guarantee has been made as to the result that may be obtained. 3.  I recognize that, during the course of this operation, or other procedure, unforseen conditions may necessitate additional or different procedure than those listed above. I, therefore, further authorize and request that the above named physician, his/her physician assistants or designees perform such procedures as are, in his/her professional opinion, necessary and desirable. 4.  Any tissue or organs removed in the operation or other procedure may be disposed of by and at the discretion of the [facility] staff and [facility]. 5.  I understand that in the event of a medical emergency, I will be transported by local paramedics to [facility] or other hospital emergency department. 6.  I certify that I have read and fully understand the above consent to operation and/or other procedure. 7.  I acknowledge that my physician has explained sedation/analgesia administration to me including the risks and benefits. I consent to the administration of sedation/analgesia as may be necessary or desirable in the judgement of my physician. Witness signature: ___________________________________________________ Date:  ______/______/_____                    Time:  ________ A. M.  P.M.        Patient Name:  Lilian Reyes  1/15/1955  [id number]         Patient signature:  ___________________________________________________               Statement of Physician  My signature below affirms that prior to the time of the procedure, I have explained to the patient and/or his/her guardian, the risks and benefits involved in the proposed treatment and any reasonable alternative to the proposed treatment. I have also explained the risks and benefits involved in the refusal of the proposed treatment and have answered the patient's questions.                         Date:  ______/______/_______  Provider                      Signature:  __________________________________________________________       Time:  ___________ A.M    P.M.

## (undated) NOTE — LETTER
AUTHORIZATION FOR SURGICAL OPERATION OR OTHER PROCEDURE    1. I hereby authorize Dr. Marshall Bueno and the [facility] staff assigned to my case to perform the following operation and/or procedure at the [facility]:  _______________________________________________________________________________________________      Botox 200 units /  BUY&BILL / Chronic migraine w/o aura w/o status migrainosus  _______________________________________________________________________________________________    2.  My physician has explained the nature and purpose of the operation or other procedure, possible alternative methods of treatment, the risks involved, and the possibility of complication to me.  I acknowledge that no guarantee has been made as to the result that may be obtained.  3.  I recognize that, during the course of this operation, or other procedure, unforseen conditions may necessitate additional or different procedure than those listed above.  I, therefore, further authorize and request that the above named physician, his/her physician assistants or designees perform such procedures as are, in his/her professional opinion, necessary and desirable.  4.  Any tissue or organs removed in the operation or other procedure may be disposed of by and at the discretion of the [facility] staff and [facility].  5.  I understand that in the event of a medical emergency, I will be transported by local paramedics to [facility] or other hospital emergency department.  6.  I certify that I have read and fully understand the above consent to operation and/or other procedure.    7.  I acknowledge that my physician has explained sedation/analgesia administration to me including the risks and benefits.  I consent to the administration of sedation/analgesia as may be necessary or desirable in the judgement of my physician.      Witness signature:  ___________________________________________________ Date:  ______/______/_____                    Time:  ________ A.M.  P.M.     Patient Name:  Peg Garcia   1/15/1955  [id number]         Patient signature:  ___________________________________________________               Statement of Physician  My signature below affirms that prior to the time of the procedure, I have explained to the patient and/or his/her guardian, the risks and benefits involved in the proposed treatment and any reasonable alternative to the proposed treatment.  I have also explained the risks and benefits involved in the refusal of the proposed treatment and have answered the patient's questions.                          Date:  ______/______/_______  Provider                      Signature:  __________________________________________________________       Time:  ___________ A.M    P.M.

## (undated) NOTE — LETTER
AUTHORIZATION FOR SURGICAL OPERATION OR OTHER PROCEDURE    1. I hereby authorize Dr. Marshall Bueno and the [facility] staff assigned to my case to perform the following operation and/or procedure at the [facility]:    _______________________________________________________________________________________________    Botox 200 units  Buy&Bill   _______________________________________________________________________________________________    2.  My physician has explained the nature and purpose of the operation or other procedure, possible alternative methods of treatment, the risks involved, and the possibility of complication to me.  I acknowledge that no guarantee has been made as to the result that may be obtained.  3.  I recognize that, during the course of this operation, or other procedure, unforseen conditions may necessitate additional or different procedure than those listed above.  I, therefore, further authorize and request that the above named physician, his/her physician assistants or designees perform such procedures as are, in his/her professional opinion, necessary and desirable.  4.  Any tissue or organs removed in the operation or other procedure may be disposed of by and at the discretion of the [facility] staff and [facility].  5.  I understand that in the event of a medical emergency, I will be transported by local paramedics to [facility] or other hospital emergency department.  6.  I certify that I have read and fully understand the above consent to operation and/or other procedure.    7.  I acknowledge that my physician has explained sedation/analgesia administration to me including the risks and benefits.  I consent to the administration of sedation/analgesia as may be necessary or desirable in the judgement of my physician.        Witness signature: ___________________________________________________ Date:  ______/______/_____                    Time:   ________ A.M.  P.M.     Patient Name:  Peg Garcia   [id number]   1/15/1955         Patient signature:  ___________________________________________________                 Statement of Physician  My signature below affirms that prior to the time of the procedure, I have explained to the patient and/or his/her guardian, the risks and benefits involved in the proposed treatment and any reasonable alternative to the proposed treatment.  I have also explained the risks and benefits involved in the refusal of the proposed treatment and have answered the patient's questions.                        Date:  ______/______/_______    Provider                      Signature:  __________________________________________________________       Time:  ___________ A.M    P.M.

## (undated) NOTE — LETTER
Date: 2023      Patient Name: Severa Asper      : 1/15/1955        Thank you for choosing [facility] as your health care provider. Your physician has deemed the following medical service(s) necessary. However, your insurance plan may not pay for all of your health care and costs and may deny payment for this service. The fact that your insurance plan does not pay for an item or service does not mean you should not receive it. The purpose of this form is to help you make an informed decision about whether or not you want to receive this service(s) that may not be paid for by your insurance plan. CPT Code Description     Cost     Occipital nerve blocks     _________ ______________________________ _____________      _________ ______________________________ _____________      I understand that the above mentioned service(s) or supply may not be covered by my insurance company.  I agree to be financially responsible for the cost of this service or supply in the event of my insurance denies payment as a non-covered benefit.        ______________________________________________________________________  Signature of Patient or Patient's Representative  Relationship  Date    ______________________________________________________________________  Signature of Witness to signing of form   Printed Name

## (undated) NOTE — ED AVS SNAPSHOT
Get Wilson   MRN: [id number]    Department:  [facility] Emergency Department   Date of Visit:  2/24/2020           Disclosure     Insurance plans vary and the physician(s) referred by the ER may not be covered by your plan.  Please within the next three months to obtain basic health screening including reassessment of your blood pressure.     IF THERE IS ANY CHANGE OR WORSENING OF YOUR CONDITION, CALL YOUR PRIMARY CARE PHYSICIAN AT ONCE OR RETURN IMMEDIATELY TO THE EMERGENCY DEPARTMEN

## (undated) NOTE — LETTER
No referring provider defined for this encounter. 01/11/23        Patient: Lele Banuelos   YOB: 1955   Date of Visit: 1/11/2023       Dear  Dr. Duane White,      Thank you for referring Lele Banuelos to my practice. Please find my assessment and plan below. As you know she is a 49-year-old -American female with a history of mild hypertension, chronic low back pain and chronic right knee pain who I now the pleasure of seeing for follow-up of chronic kidney disease stage III. Overall the patient states she has been doing well without any chest pain, shortness of breath, GI or urinary tract symptoms. Still having ongoing problems with her low back and just had a recent epidural injection. On physical exam her blood pressure is 136/80 with a pulse of 77 and she weighs 215 pounds. Her neck was supple without JVD. Lungs were clear. Heart revealed a regular rate and rhythm with an S4 but no gallops, murmurs or rubs. Abdomen was soft, flat, nontender without organomegaly, masses or bruits. Extremities revealed no clubbing, cyanosis or edema. The patient just had follow-up laboratory studies today. Her renal function actually is better and her creatinine is down to 0.98 with an estimated GFR of 63 cc/min. Potassium 4.5 and hemoglobin 12.1. She also wanted me to check her thyroid function as she is gaining weight and just feels tired. Her TSH though was normal as well. Therefore reassurance was given. We will just have her repeat a renal panel in 6 months to ensure stability. Blood pressures seem to be under adequate control. Avoid nonsteroidals. Maintain adequate hydration. She was advised to discuss with you regarding her chronic fatigue. Thank you again for allowing me to participate in the care of your patient. If you have any questions please feel free to call.            Sincerely,   Florette Gilford, MD   [facility]  [address] [facility], [address] [address] [facility], [address] [address]    Document electronically generated by:  Dominique Melendez MD

## (undated) NOTE — ED AVS SNAPSHOT
Destinee Johnson   MRN: [id number]    Department:  [facility] Emergency Department   Date of Visit:  9/30/2019           Disclosure     Insurance plans vary and the physician(s) referred by the ER may not be covered by your plan.  Please contact CARE PHYSICIAN AT ONCE OR RETURN IMMEDIATELY TO THE EMERGENCY DEPARTMENT. If you have been prescribed any medication(s), please fill your prescription right away and begin taking the medication(s) as directed.   If you believe that any of the medications
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
## (undated) NOTE — LETTER
AUTHORIZATION FOR SURGICAL OPERATION OR OTHER PROCEDURE    1.  I hereby authorize Dr. Frantz Middleton and the [facility] staff assigned to my case to perform the following operation and/or procedure at the [facility]:    Ultrasound guided left greater trochanteric bu Witness signature: ___________________________________________________ Date:  ______/______/_____                    Time:  ________ A. M.  P.M.        Patient Name:  Bert Adhikaritiffany Lutcher____________________________________________________  (please prin

## (undated) NOTE — LETTER
Date: 2024      Patient Name: Peg Garcia      : 1/15/1955        Thank you for choosing [facility] as your health care provider. Your physician has deemed the following medical service(s) necessary. However, your insurance plan may not pay for all of your health care and costs and may deny payment for this service. The fact that your insurance plan does not pay for an item or service does not mean you should not receive it. The purpose of this form is to help you make an informed decision about whether or not you want to receive this service(s) that may not be paid for by your insurance plan.    CPT Code Description     Cost     _________ __ __NERVE BLOCK______________  ____$800____      _________ ______________________________ _____________      _________ ______________________________ _____________      I understand that the above mentioned service(s) or supply may not be covered by my insurance company. I agree to be financially responsible for the cost of this service or supply in the event of my insurance denies payment as a non-covered benefit.        ______________________________________________________________________  Signature of Patient or Patient's Representative  Relationship  Date        ______________________________________________________________________  Signature of Witness to signing of form   Printed Name

## (undated) NOTE — LETTER
1/10/2025    Peg Garcia  [address] [facility] [address]         Dear Peg,    This letter is to inform you that our office has made several attempts to reach you by phone without success.  We were attempting to contact you by phone regarding an appointment request.     Please contact our office at the number listed below as soon as you receive this letter to discuss this issue and to make the necessary changes in our system to your contact information.  Thank you for your cooperation.        Sincerely,    Luis Eduardo Chavez, DO  [address] [place] [id number]  Ph: [phone]  Fax: [phone]         Document electronically generated by:  Lizabeth MCKEON RN

## (undated) NOTE — LETTER
AIDEN Notifier: [email or web address]   EVE. Patient Name: Ivette Perrin Identification Number: [id number]      Advance Beneficiary Notice of Noncoverage (ABN)  NOTE:  If Medicare doesn’t pay for D. item/service below, you may have to pay.   [employer] ? OPTION 2. I want the D. item/service listed above, but do not bill Medicare. You may ask to be paid now as I am responsible for payment. I cannot appeal if Medicare is not billed. ? OPTION 3. I don’t want the D. item/service listed above.  I understand

## (undated) NOTE — ED AVS SNAPSHOT
Valerie Evans   MRN: [id number]    Department:  [facility] Emergency Department   Date of Visit:  2/26/2018           Disclosure     Insurance plans vary and the physician(s) referred by the ER may not be covered by your plan.  Please within the next three months to obtain basic health screening including reassessment of your blood pressure.     IF THERE IS ANY CHANGE OR WORSENING OF YOUR CONDITION, CALL YOUR PRIMARY CARE PHYSICIAN AT ONCE OR RETURN IMMEDIATELY TO THE EMERGENCY DEPARTMEN

## (undated) NOTE — LETTER
AUTHORIZATION FOR SURGICAL OPERATION OR OTHER PROCEDURE    1. I hereby authorize Dr. Nura Govea and the [facility] staff assigned to my case to perform the following operation and/or procedure at the [facility]:    ________________BILATERAL OCCIPITAL NERVE BLOCK______________________________________________      _______________________________________________________________________________________________    2. My physician has explained the nature and purpose of the operation or other procedure, possible alternative methods of treatment, the risks involved, and the possibility of complication to me. I acknowledge that no guarantee has been made as to the result that may be obtained. 3.  I recognize that, during the course of this operation, or other procedure, unforseen conditions may necessitate additional or different procedure than those listed above. I, therefore, further authorize and request that the above named physician, his/her physician assistants or designees perform such procedures as are, in his/her professional opinion, necessary and desirable. 4.  Any tissue or organs removed in the operation or other procedure may be disposed of by and at the discretion of the [facility] staff and [facility]. 5.  I understand that in the event of a medical emergency, I will be transported by local paramedics to [facility] or other hospital emergency department. 6.  I certify that I have read and fully understand the above consent to operation and/or other procedure. 7.  I acknowledge that my physician has explained sedation/analgesia administration to me including the risks and benefits. I consent to the administration of sedation/analgesia as may be necessary or desirable in the judgement of my physician. Witness signature: ___________________________________________________ Date:  ______/______/_____                    Time:  ________ A. M.  P.M.        Patient Name: Kierra Mustafa   01/15/1955  (please print)       Patient signature:  ___________________________________________________             Relationship to Patient:           []  Parent    Responsible person                          []  Spouse  In case of minor or                    [] Other  _____________   Incompetent name:  __________________________________________________                               (please print)      _____________      Responsible person  In case of minor or  Incompetent signature:  _______________________________________________    Statement of Physician  My signature below affirms that prior to the time of the procedure, I have explained to the patient and/or his/her guardian, the risks and benefits involved in the proposed treatment and any reasonable alternative to the proposed treatment. I have also explained the risks and benefits involved in the refusal of the proposed treatment and have answered the patient's questions.                         Date:  ______/______/_______  Provider                      Signature:  __________________________________________________________       Time:  ___________ A.M    P.M.

## (undated) NOTE — LETTER
[address]  [address] [facility]  [place]       10/10/22        Patient: Lola Richmond   YOB: 1955   Date of Visit: 10/10/2022       Dear  Dr. Katty Herrera,      Thank you for referring Lola Richmond to my practice. Please find my assessment and plan below. As you know she is a 78-year-old -American female with a history of hypertension, chronic low back pain and chronic right knee pain who I now had the pleasure of seeing for follow-up of chronic kidney disease stage III. The patient just underwent a recent renal evaluation. Of note is that a urinalysis, sed rate, connective tissue profile and random urine for Bence-Anderson protein was nonrevealing. Urine microalbumin to creatinine ratio was normal.  She also had a renal ultrasound that showed a 1.6 cm right renal cyst but otherwise was unremarkable. Finally a repeat creatinine done in September 23, 2022 showed improvement down to 1.15 now with an estimated GFR of 52 cc/min. Potassium better but borderline low at 3.4. I therefore informed the patient that she does have chronic kidney disease stage III secondary to hypertension and nephrosclerosis. Reinforced importance of maintaining adequate hydration and avoiding nonsteroidals. Blood pressure was good today 120/72. She is still borderline hypokalemic. She states she is taking an over-the-counter potassium supplement. She will call me with the name of this but may be prudent to just add a potassium chloride supplement. High potassium foods were also reviewed with her. Recommended repeating a CBC and renal panel in 3 months. I will see her again in 6 months for follow-up or sooner if clinically indicated. Thank you again for allowing me to participate in the care of your patient. If you have any questions please feel free to call.            Sincerely,   Coco Jackson MD   [address], [address] [place] [address], [address] [address]    Document electronically generated by:  Lima Hess MD

## (undated) NOTE — LETTER
Date: May 13, 2025      Patient Name: Peg Garcia      : 1/15/1955        Thank you for choosing [facility] as your health care provider. Your physician has deemed the following medical service(s) necessary. However, your insurance plan may not pay for all of your health care and costs and may deny payment for this service. The fact that your insurance plan does not pay for an item or service does not mean you should not receive it. The purpose of this form is to help you make an informed decision about whether or not you want to receive this service(s) that may not be paid for by your insurance plan.    CPT Code Description     Cost     Botox 200 U      $4,000  _________ ______________________________  _____________      _________ ______________________________ _____________      _________ ______________________________ _____________      I understand that the above mentioned service(s) or supply may not be covered by my insurance company. I agree to be financially responsible for the cost of this service or supply in the event of my insurance denies payment as a non-covered benefit.     (PLEASE INFORM THE PATIENT TO CONTACT THE OFFICE IF THE INSURANCE CHANGES WITHIN THE YEAR AS HE/SHE WILL BE RESPONSIBLE TO UPDATE THE OFFICE IF INSURANCE CHANGES SO THAT PROCEDURE IS BILLED CORRECTLY) this will be 3  5     ______________________________________________________________________  Signature of Patient or Patient's Representative  Relationship  Date    ______________________________________________________________________  Signature of Witness to signing of form   Printed Name

## (undated) NOTE — LETTER
Date: 2024      Patient Name: Peg Garcia      : 1/15/1955        Thank you for choosing [facility] as your health care provider. Your physician has deemed the following medical service(s) necessary. However, your insurance plan may not pay for all of your health care and costs and may deny payment for this service. The fact that your insurance plan does not pay for an item or service does not mean you should not receive it. The purpose of this form is to help you make an informed decision about whether or not you want to receive this service(s) that may not be paid for by your insurance plan.    CPT Code Description     Cost     _________ Bilateral Occipital nerve block  $800      _________ ______________________________ _____________      _________ ______________________________ _____________      I understand that the above mentioned service(s) or supply may not be covered by my insurance company. I agree to be financially responsible for the cost of this service or supply in the event of my insurance denies payment as a non-covered benefit.        ______________________________________________________________________  Signature of Patient or Patient's Representative  Relationship  Date    ______________________________________________________________________  Signature of Witness to signing of form   Printed Name

## (undated) NOTE — LETTER
[facility] SURGERY SCHEDULING FORM   [address] [facility]   [phone] (scheduling phone) [phone] (scheduling fax)     PATIENT INFORMATION   Last Name:      John Amaro      First Name:    Annemarie Gordon []  No or using our own   Allergies: Patient has no known allergies.          Completed by:    Debi Section      Date:    2/25/2020

## (undated) NOTE — LETTER
Notifier: [employer]       Patient Name: Peg Garcia       Identification Number: [id number]                          Advance Beneficiary Notice of Noncoverage (ABN)   NOTE:  If Medicare doesn’t pay for D. Items/service(s) below, you may have to pay.  Medicare does not pay for everything, even some care that you or your health care provider have good reason to think you need. We expect Medicare may not pay for the D. items/service(s) below.  Items or Services Reason Medicare May Not Pay: Estimated Cost   __EKG ($45.00)  __Pap smear ($121) __Pelvic/Breast ($60.00)  __ Ear Irrigation ($131)  _X_ Injection(s)  ___ Tdap ($63)      __ Meningitis ($198)    __Prevnar ($285)  ___ Td ($33)             __ Shingrix ($215)         __Pneumovax ($155)  ___ Hep A ($107)    __ Hep B ($102)            ___Prolia ($1,827)  ___ Xiaflex ($                    )  ___ Vaccine Administration ($31)   __ Medicare does not cover this service      _X_ Medicare may not pay for this   item/service for your condition     __ Medicare may not pay for this item/service as often as this     $800   WHAT YOU NEED TO DO NOW:  Read this notice, so you can make an informed decision about your care.  Ask us any questions that you may have after you finish reading.  Choose an option below about whether to receive the D. item/service(s)  listed above.  Note: If you choose Option 1 or 2, we may help you to use any other insurance that you might have, but Medicare cannot require us to do this.  OPTIONS: Check only one box.  We cannot choose a box for you.   OPTION 1. I want the D. item/service(s) listed above. You may ask to be paid now, but I also want Medicare billed for an official decision on payment, which is sent to me on a Medicare Summary Notice (MSN). I understand that if Medicare doesn’t pay, I am responsible for payment, but I can appeal to Medicare by following the directions on the MSN. If Medicare does pay, you will refund any payments I  made to you, less co-pays or deductibles.  OPTION 2. I want the D. item/service(s) listed above, but do not bill Medicare. You may ask to be paid now as I am responsible for payment. I cannot appeal if Medicare is not billed.  OPTION 3. I don't want the D. item/service(s) listed above. I understand with this choice I am not responsible for payment, and I cannot appeal to see if Medicare would pay.    H. Additional Information:    This notice gives our opinion, not an official Medicare decision. If you have other questions on this notice or Medicare billing, call 1-800-MEDICARE (1-535.611.4025/TTY: 1-672.787.6079). Signing below means that you have received and understand this notice. You also receive a copy.  Signature: Date:       You have the right to get Medicare information in an accessible format, like large print, Braille, or audio. You also have the right to file a complaint if you feel you’ve been discriminated against. Visit Medicare.gov/about- us/poagjohdthkfm-qcavbdprmsilzlcud-kqydqo.  According to the Paperwork Reduction Act of 1995, no persons are required to respond to a collection of information unless it displays a valid OMB control number. The valid OMB control number for this information collection is 3962-0240. The time required to complete this information collection is estimated to average 7 minutes per response, including the time to review instructions, search existing data resources, gather the data needed, and complete and review the information collection. If you have comments concerning the accuracy of the time estimate or suggestions for improving this form, please write to: CMS, [facility]     Gurvinder Attn: GASTON Reports Clearance Officer, [place], [place] [address].  Form CMS-R-131 (Exp. 1/31/2026) Form Approved OMB No. 1933-1131

## (undated) NOTE — LETTER
Patient Name: Koko Wright  YOB: 1955          MRN :  [id number]  Date:  2019  Referring Physician:  Eren Baeza        Patient Name: Koko Wright, : 1/15/1955, MRN: [id number]   Date:  2019  Referring Physician:  Qi Ba

## (undated) NOTE — ED AVS SNAPSHOT
Mable Theodore   MRN: [id number]    Department:  [facility] Emergency Department   Date of Visit:  9/19/2017           Disclosure     Insurance plans vary and the physician(s) referred by the ER may not be covered by your plan.  Please CARE PHYSICIAN AT ONCE OR RETURN IMMEDIATELY TO THE EMERGENCY DEPARTMENT. If you have been prescribed any medication(s), please fill your prescription right away and begin taking the medication(s) as directed.   If you believe that any of the medications

## (undated) NOTE — LETTER
Date: 2024      Patient Name: Peg Garcia        : 1/15/1955     [id number]          Thank you for choosing [facility] as your health care provider. Your physician has deemed the following medical service(s) necessary. However, your insurance plan may not pay for all of your health care and costs and may deny payment for this service. The fact that your insurance plan does not pay for an item or service does not mean you should not receive it. The purpose of this form is to help you make an informed decision about whether or not you want to receive this service(s) that may not be paid for by your insurance plan.    CPT Code Description     Cost     _________ ______BOTOX 200 UNITS___________  __$4000____      _________ ______________________________ _____________      _________ ______________________________ _____________      I understand that the above mentioned service(s) or supply may not be covered by my insurance company. I agree to be financially responsible for the cost of this service or supply in the event of my insurance denies payment as a non-covered benefit.      (PLEASE INFORM THE PATIENT TO CONTACT THE OFFICE IF THE INSURANCE CHANGES WITHIN THE YEAR AS HE/SHE WILL BE RESPONSIBLE TO UPDATE THE OFFICE IF INSURANCE CHANGES SO THAT PROCEDURE IS BILLED CORRECTLY)           ______________________________________________________________________  Signature of Patient or Patient's Representative  Relationship  Date          ______________________________________________________________________  Signature of Witness to signing of form   Printed Name

## (undated) NOTE — LETTER
[facility] SURGERY SCHEDULING FORM   [address] [place] [address] [place] [address]   [phone] (scheduling phone) [phone] (scheduling fax)     PATIENT INFORMATION   Last Name:      Elizabeth Carter      First Name:    Audelia Lawson []  Klahr  []  Regional/              []  Spinal  []  No Anesthesia   [x]  Yes  []  No or using our own   Allergies: Patient has no known allergies.          Completed by:    Oziel Horton      Date:    6/22/2020

## (undated) NOTE — LETTER
AUTHORIZATION FOR SURGICAL OPERATION OR OTHER PROCEDURE    1. I hereby authorize Dr. Bueno and the [facility] staff assigned to my case to perform the following operation and/or procedure at the [facility]:    _______________________________________________________________________________________________    Nerve block  _______________________________________________________________________________________________    2.  My physician has explained the nature and purpose of the operation or other procedure, possible alternative methods of treatment, the risks involved, and the possibility of complication to me.  I acknowledge that no guarantee has been made as to the result that may be obtained.  3.  I recognize that, during the course of this operation, or other procedure, unforseen conditions may necessitate additional or different procedure than those listed above.  I, therefore, further authorize and request that the above named physician, his/her physician assistants or designees perform such procedures as are, in his/her professional opinion, necessary and desirable.  4.  Any tissue or organs removed in the operation or other procedure may be disposed of by and at the discretion of the [facility] staff and [facility].  5.  I understand that in the event of a medical emergency, I will be transported by local paramedics to [facility] or other hospital emergency department.  6.  I certify that I have read and fully understand the above consent to operation and/or other procedure.    7.  I acknowledge that my physician has explained sedation/analgesia administration to me including the risks and benefits.  I consent to the administration of sedation/analgesia as may be necessary or desirable in the judgement of my physician.    Witness signature: ___________________________________________________ Date:  ______/______/_____                    Time:  ________ A.M.  P.M.        Patient Name:  ______________________________________________________  (please print)       Patient signature:  ___________________________________________________             Statement of Physician  My signature below affirms that prior to the time of the procedure, I have explained to the patient and/or his/her guardian, the risks and benefits involved in the proposed treatment and any reasonable alternative to the proposed treatment.  I have also explained the risks and benefits involved in the refusal of the proposed treatment and have answered the patient's questions.                        Date:  ______/______/_______  Provider                      Signature:  __________________________________________________________       Time:  ___________ A.M    P.M.

## (undated) NOTE — LETTER
AUTHORIZATION FOR SURGICAL OPERATION OR OTHER PROCEDURE    1. I hereby authorize Dr. Marshall Bueno and the [facility] staff assigned to my case to perform the following operation and/or procedure at the [facility]:    _______________________________________________________________________________________________    Bilateral Occipital nerve block  _______________________________________________________________________________________________    2.  My physician has explained the nature and purpose of the operation or other procedure, possible alternative methods of treatment, the risks involved, and the possibility of complication to me.  I acknowledge that no guarantee has been made as to the result that may be obtained.  3.  I recognize that, during the course of this operation, or other procedure, unforseen conditions may necessitate additional or different procedure than those listed above.  I, therefore, further authorize and request that the above named physician, his/her physician assistants or designees perform such procedures as are, in his/her professional opinion, necessary and desirable.  4.  Any tissue or organs removed in the operation or other procedure may be disposed of by and at the discretion of the [facility] staff and [facility].  5.  I understand that in the event of a medical emergency, I will be transported by local paramedics to [facility] or other hospital emergency department.  6.  I certify that I have read and fully understand the above consent to operation and/or other procedure.    7.  I acknowledge that my physician has explained sedation/analgesia administration to me including the risks and benefits.  I consent to the administration of sedation/analgesia as may be necessary or desirable in the judgement of my physician.    Witness signature: ___________________________________________________ Date:  ______/______/_____                    Time:   ________ A.MJam  P.MJam     Peg Garcia  [id number]  1/15/1955    Patient signature:  ___________________________________________________    Statement of Physician  My signature below affirms that prior to the time of the procedure, I have explained to the patient and/or his/her guardian, the risks and benefits involved in the proposed treatment and any reasonable alternative to the proposed treatment.  I have also explained the risks and benefits involved in the refusal of the proposed treatment and have answered the patient's questions.                        Date:  ______/______/_______  Provider                      Signature:  __________________________________________________________       Time:  ___________ A.M    P.M.

## (undated) NOTE — LETTER
4/10/2025              Peg Garcia        [address] [facility] [address]         Dear Peg,      Thank you for putting your trust in [facility].  Our goal is to deliver the highest quality healthcare and an exceptional patient experience. Upon reviewing of your medical record shows you are due for the following:       Annual Medicare Physical         Please call [phone], to schedule your appointment or schedule online via [employer].     If you changed to a new provider at another facility, please notify the clinic to update your records.    If you had any recent testing at another facility, please have your results faxed to our office at [phone].         Thank you and have a great day!      Sincerely,    Luis Eduardo Chavez, DO          Document electronically generated by:  Radha Meyer

## (undated) NOTE — LETTER
Notifier: [facility]       Patient Name: Peg Garcia       Identification Number: [id number]                          Advance Beneficiary Notice of Noncoverage (ABN)   NOTE:  If Medicare doesn’t pay for D. Items/service(s) below, you may have to pay.  Medicare does not pay for everything, even some care that you or your health care provider have good reason to think you need. We expect Medicare may not pay for the D. items/service(s) below.  Items or Services Reason Medicare May Not Pay: Estimated Cost   __EKG ($45.00)  __Pap smear ($121) __Pelvic/Breast ($60.00)  __ Ear Irrigation ($131)  __ Injection(s)  ___ Tdap ($63)      __ Meningitis ($198)    __Prevnar ($285)  ___ Td ($33)             __ Shingrix ($215)         __Pneumovax ($155)  ___ Hep A ($107)    __ Hep B ($102)            ___Prolia ($1,827)  ___ Xiaflex ($                    )  ___ Vaccine Administration ($31)   __ Medicare does not cover this service      __ Medicare may not pay for this   item/service for your condition     __ Medicare may not pay for this item/service as often as this        WHAT YOU NEED TO DO NOW:  Read this notice, so you can make an informed decision about your care.  Ask us any questions that you may have after you finish reading.  Choose an option below about whether to receive the D. item/service(s)  listed above.  Note: If you choose Option 1 or 2, we may help you to use any other insurance that you might have, but Medicare cannot require us to do this.  OPTIONS: Check only one box.  We cannot choose a box for you.   OPTION 1. I want the D. item/service(s) listed above. You may ask to be paid now, but I also want Medicare billed for an official decision on payment, which is sent to me on a Medicare Summary Notice (MSN). I understand that if Medicare doesn’t pay, I am responsible for payment, but I can appeal to Medicare by following the directions on the MSN. If Medicare does pay, you will refund any payments I made  to you, less co-pays or deductibles.  OPTION 2. I want the D. item/service(s) listed above, but do not bill Medicare. You may ask to be paid now as I am responsible for payment. I cannot appeal if Medicare is not billed.  OPTION 3. I don't want the D. item/service(s) listed above. I understand with this choice I am not responsible for payment, and I cannot appeal to see if Medicare would pay.    H. Additional Information:    This notice gives our opinion, not an official Medicare decision. If you have other questions on this notice or Medicare billing, call 1-800-MEDICARE ([phone]/TTY: [phone]). Signing below means that you have received and understand this notice. You also receive a copy.  Signature: Date:       You have the right to get Medicare information in an accessible format, like large print, Braille, or audio. You also have the right to file a complaint if you feel you’ve been discriminated against. Visit Medicare.gov/about- us/qvamvnksdhxrm-pvgkatxmmkesjhbit-fhvjfk.  According to the Paperwork Reduction Act of 1995, no persons are required to respond to a collection of information unless it displays a valid OMB control number. The valid OMB control number for this information collection is [id number]. The time required to complete this information collection is estimated to average 7 minutes per response, including the time to review instructions, search existing data resources, gather the data needed, and complete and review the information collection. If you have comments concerning the accuracy of the time estimate or suggestions for improving this form, please write to: CMS, [facility]     Gurvinder Attn: GASTON Reports Clearance Officer, [place], [place] [address].  Form CMS-R-131 (Exp. 1/31/2026) Form Approved OMB No. [id number]

## (undated) NOTE — LETTER
[facility] Notifier: [email or web address]. Patient Name: Peg Garcia Identification Number: [id number]                          Advance Beneficiary Notice of Noncoverage (ABN)   NOTE:  If Medicare doesn’t pay for D. item/service(s) below, you may have to pay.  Medicare does not pay for everything, even some care that you or your health care provider have good reason to think you need. We expect Medicare may not pay for the D. item/service(s) below.  D. Items or Services E. Reason Medicare May Not Pay: F. Estimated Cost   __ EKG ($87.00)  __ Pap smear ($101) __Pelvic/Breast ($147.00)  __ Ear Irrigation ($138)  _X_ Injection(s) BOTOX 200 UNITS  ___ Tdap ($181)       ___ Meningitis ($290)   __Prevnar ($555)  ___ Td ($66)              ___ Prevnar 20 ($549)  ___ Hep A ($152)     ___ Prolia ($1827)         __ Xiaflex ($            )   ___ Hep B ($150)     ___ Pneumovax ($287)                                         ___ Vaccine Administration ($65)   __ Medicare does not cover this service      _X_ Medicare may not pay for this   item/service for your condition     __ Medicare may not pay for this item/service as often as this       $4,000.00   WHAT YOU NEED TO DO NOW:  Read this notice, so you can make an informed decision about your care.  Ask us any questions that you may have after you finish reading.  Choose an option below about whether to receive the D. item/service(s) listed above.  Note: If you choose Option 1 or 2, we may help you to use any other insurance that you might have, but Medicare cannot require us to do this.  G. OPTIONS: Check only one box.  We cannot choose a box for you.   OPTION 1. I want the D. item/service(s) listed above. You may ask to be paid now, but I also want Medicare billed for an official decision on payment, which is sent to me on a Medicare Summary Notice (MSN). I understand that if Medicare doesn’t pay, I am responsible for payment, but I can appeal to Medicare by following  the directions on the MSN. If Medicare does pay, you will refund any payments I made to you, less co-pays or deductibles.  OPTION 2. I want the D. item/service(s) listed above, but do not bill Medicare. You may ask to be paid now as I am responsible for payment. I cannot appeal if Medicare is not billed.  OPTION 3. I don't want the D. item/service(s) listed above. I understand with this choice I am not responsible for payment, and I cannot appeal to see if Medicare would pay.    H. Additional Information:    This notice gives our opinion, not an official Medicare decision. If you have other questions on this notice or Medicare billing, call 1-800-MEDICARE ([phone]/TTY: [phone]). Signing below means that you have received and understand this notice. You also receive a copy.  I. Signature: J. Date:  2/5/2025       You have the right to get Medicare information in an accessible format, like large print, Braille, or audio. You also have the right to file a complaint if you feel you’ve been discriminated against. Visit Medicare.gov/about- us/nezoummtcaoei-qmwfqkveqmhibreid-nsojix.  According to the Paperwork Reduction Act of 1995, no persons are required to respond to a collection of information unless it displays a valid OMB control number. The valid OMB control number for this information collection is [id number]. The time required to complete this information collection is estimated to average 7 minutes per response, including the time to review instructions, search existing data resources, gather the data needed, and complete and review the information collection. If you have comments concerning the accuracy of the time estimate or suggestions for improving this form, please write to: CMS, [facility] [employer]     Gurvinder Attn: GASTON Reports Clearance Officer, [place], [place] [address].  Form CMS-R-131 (Exp. 1/31/2026) Form Approved OMB No. [id number]